# Patient Record
Sex: FEMALE | Race: WHITE | NOT HISPANIC OR LATINO | Employment: OTHER | URBAN - METROPOLITAN AREA
[De-identification: names, ages, dates, MRNs, and addresses within clinical notes are randomized per-mention and may not be internally consistent; named-entity substitution may affect disease eponyms.]

---

## 2017-01-03 ENCOUNTER — TELEPHONE (OUTPATIENT)
Dept: OBSTETRICS AND GYNECOLOGY | Facility: CLINIC | Age: 72
End: 2017-01-03

## 2017-01-03 NOTE — TELEPHONE ENCOUNTER
Attempted to schedule for time available this am but patient not available to come in at that time. Scheduled for Next Tuesday.

## 2017-01-03 NOTE — TELEPHONE ENCOUNTER
----- Message from Syl Sotomayor sent at 1/3/2017  7:55 AM CST -----  Contact: self  Patient needs same day appointment due to rash in private area. Please call patient at [phone]. Thanks!

## 2017-01-10 ENCOUNTER — OFFICE VISIT (OUTPATIENT)
Dept: OBSTETRICS AND GYNECOLOGY | Facility: CLINIC | Age: 72
End: 2017-01-10
Payer: MEDICARE

## 2017-01-10 VITALS
WEIGHT: 255.31 LBS | SYSTOLIC BLOOD PRESSURE: 168 MMHG | BODY MASS INDEX: 42.48 KG/M2 | DIASTOLIC BLOOD PRESSURE: 90 MMHG

## 2017-01-10 DIAGNOSIS — B37.9 CANDIDIASIS: Primary | ICD-10-CM

## 2017-01-10 PROCEDURE — 1160F RVW MEDS BY RX/DR IN RCRD: CPT | Mod: S$GLB,,, | Performed by: OBSTETRICS & GYNECOLOGY

## 2017-01-10 PROCEDURE — 99999 PR PBB SHADOW E&M-EST. PATIENT-LVL III: CPT | Mod: PBBFAC,,, | Performed by: OBSTETRICS & GYNECOLOGY

## 2017-01-10 PROCEDURE — 1126F AMNT PAIN NOTED NONE PRSNT: CPT | Mod: S$GLB,,, | Performed by: OBSTETRICS & GYNECOLOGY

## 2017-01-10 PROCEDURE — 1157F ADVNC CARE PLAN IN RCRD: CPT | Mod: S$GLB,,, | Performed by: OBSTETRICS & GYNECOLOGY

## 2017-01-10 PROCEDURE — 87480 CANDIDA DNA DIR PROBE: CPT

## 2017-01-10 PROCEDURE — 99213 OFFICE O/P EST LOW 20 MIN: CPT | Mod: S$GLB,,, | Performed by: OBSTETRICS & GYNECOLOGY

## 2017-01-10 PROCEDURE — 1159F MED LIST DOCD IN RCRD: CPT | Mod: S$GLB,,, | Performed by: OBSTETRICS & GYNECOLOGY

## 2017-01-10 RX ORDER — TRIAMCINOLONE ACETONIDE 5 MG/G
CREAM TOPICAL 2 TIMES DAILY
Qty: 30 G | Refills: 1 | Status: SHIPPED | OUTPATIENT
Start: 2017-01-10 | End: 2017-01-16 | Stop reason: SDUPTHER

## 2017-01-10 RX ORDER — FLUCONAZOLE 150 MG/1
150 TABLET ORAL
Qty: 4 TABLET | Refills: 0 | Status: SHIPPED | OUTPATIENT
Start: 2017-01-10 | End: 2017-03-27 | Stop reason: ALTCHOICE

## 2017-01-10 NOTE — MR AVS SNAPSHOT
[facility] - OB/GYN  [address] [place] [address]  Phone: [phone]                  Elicia Cavazos   1/10/2017 11:00 AM   Office Visit    Description:  Female : 1945   Provider:  Marta Snowden MD   Department:  [facility] - OB/GYN           Reason for Visit     Rash                To Do List           Goals (5 Years of Data)     None      [facility]     [facility] On Call Nurse Care Line -  Assistance  Registered nurses in the [facility] On Call Center provide clinical advisement, health education, appointment booking, and other advisory services.  Call for this free service at [phone].             Medications           Message regarding Medications     Verify the changes and/or additions to your medication regime listed below are the same as discussed with your clinician today.  If any of these changes or additions are incorrect, please notify your healthcare provider.             Verify that the below list of medications is an accurate representation of the medications you are currently taking.  If none reported, the list may be blank. If incorrect, please contact your healthcare provider. Carry this list with you in case of emergency.           Current Medications     aspirin (ECOTRIN) 81 MG EC tablet Take 81 mg by mouth once daily.      calcium-vitamin D 500-125 mg-unit tablet Take 1 tablet by mouth once daily.      coenzyme Q10 400 mg Cap Take 400 mg by mouth once daily.      fish oil-omega-3 fatty acids 300-1,000 mg capsule Take 2 g by mouth once daily.      metoprolol tartrate (LOPRESSOR) 50 MG tablet Take 50 mg by mouth once daily.      multivitamin (MULTIVITAMIN) per tablet Take 1 tablet by mouth once daily.      nutritional supplements Liqd Take by mouth once daily.      ramipril (ALTACE) 5 MG capsule Take 5 mg by mouth once daily.      terconazole (TERAZOL 7) 0.4 % Crea Place 1 applicator vaginally every evening.    vitamin D 185 MG Tab Take 185 mg by  mouth once daily.      nystatin-triamcinolone (MYCOLOG II) cream Apply to affected area 2 times daily    triamcinolone acetonide 0.5% (KENALOG) 0.5 % Crea Apply topically every evening.    VITAMIN E/FLAXSEED OIL (FLAXSEED OIL-VITAMIN E) 1,000-1 mg-unit Cap Take 1,000 mg by mouth once daily.             Clinical Reference Information           Vital Signs - Last Recorded  Most recent update: 1/10/2017 11:13 AM by Barbara Umana LPN    BP Wt BMI          (!) 168/90 115.8 kg (255 lb 4.7 oz) 42.48 kg/m2        Blood Pressure          Most Recent Value    BP  (!)  168/90      Allergies as of 1/10/2017     Crestor [Rosuvastatin]    Lipitor [Atorvastatin]    Pravachol [Pravastatin]    Sulfa (Sulfonamide Antibiotics)    Welchol [Colesevelam]    Zocor [Simvastatin]      Immunizations Administered on Date of Encounter - 1/10/2017     None

## 2017-01-10 NOTE — PROGRESS NOTES
Chief Complaint   Patient presents with    Rash       History of Present Illness: Elicia Cavazos is a 71 y.o. female that presents today 1/10/2017 for   Chief Complaint   Patient presents with    Rash   not getting better using multiple products, 2 weeks going on and getting worse.   She was treated for contact dermatitis with medrol pack and it resolved 10/16    Past Medical History   Diagnosis Date    Hyperlipidemia     Hypertension        Past Surgical History   Procedure Laterality Date    Hysterectomy      Carpal tunnel release      Appendectomy      Cataract extraction bilateral w/ anterior vitrectomy      Colonoscopy N/A 12/18/2015     Procedure: COLONOSCOPY;  Surgeon: Timothy Syed Jr., MD;  Location: [facility];  Service: Endoscopy;  Laterality: N/A;       Current Outpatient Prescriptions   Medication Sig Dispense Refill    aspirin (ECOTRIN) 81 MG EC tablet Take 81 mg by mouth once daily.        calcium-vitamin D 500-125 mg-unit tablet Take 1 tablet by mouth once daily.        coenzyme Q10 400 mg Cap Take 400 mg by mouth once daily.        fish oil-omega-3 fatty acids 300-1,000 mg capsule Take 2 g by mouth once daily.        metoprolol tartrate (LOPRESSOR) 50 MG tablet Take 50 mg by mouth once daily.        multivitamin (MULTIVITAMIN) per tablet Take 1 tablet by mouth once daily.        nutritional supplements Liqd Take by mouth once daily.        ramipril (ALTACE) 5 MG capsule Take 5 mg by mouth once daily.        terconazole (TERAZOL 7) 0.4 % Crea Place 1 applicator vaginally every evening. 45 g 4    vitamin D 185 MG Tab Take 185 mg by mouth once daily.        fluconazole (DIFLUCAN) 150 MG Tab Take 1 tablet (150 mg total) by mouth Every 3 (three) days. 4 tablet 0    nystatin-triamcinolone (MYCOLOG II) cream Apply to affected area 2 times daily 30 g 5    triamcinolone acetonide 0.5% (KENALOG) 0.5 % Crea Apply topically 2 (two) times daily. 30 g 1    VITAMIN E/FLAXSEED OIL (FLAXSEED  OIL-VITAMIN E) 1,000-1 mg-unit Cap Take 1,000 mg by mouth once daily.         No current facility-administered medications for this visit.        Review of patient's allergies indicates:   Allergen Reactions    Crestor [rosuvastatin] Other (See Comments)     Muscle ache    Lipitor [atorvastatin] Other (See Comments)     Muscle ache    Pravachol [pravastatin] Other (See Comments)     Muscle ache    Sulfa (sulfonamide antibiotics) Swelling    Welchol [colesevelam] Other (See Comments)     Muscle ache    Zocor [simvastatin] Other (See Comments)     Muscle ache       Family History   Problem Relation Age of Onset    Ovarian cancer Neg Hx     Breast cancer Neg Hx        Social History   Substance Use Topics    Smoking status: Never Smoker    Smokeless tobacco: Never Used    Alcohol use No       OB History    Para Term  AB SAB TAB Ectopic Multiple Living   4 4 4             # Outcome Date GA Lbr Yariel/2nd Weight Sex Delivery Anes PTL Lv   4 Term            3 Term            2 Term            1 Term                   Review of Symptoms:  GENERAL: Denies weight gain or weight loss. Feeling well overall.   SKIN: Denies rash or lesions.   HEAD: Denies head injury or headache.   NODES: Denies enlarged lymph nodes.   CHEST: Denies chest pain or shortness of breath.   CARDIOVASCULAR: Denies palpitations or left sided chest pain.   ABDOMEN: No abdominal pain, constipation, diarrhea, nausea, vomiting or rectal bleeding.   URINARY: No frequency, dysuria, hematuria, or burning on urination.  HEMATOLOGIC: No easy bruisability or excessive bleeding.   MUSCULOSKELETAL: Denies joint pain or swelling.     Visit Vitals    BP (!) 168/90    Wt 115.8 kg (255 lb 4.7 oz)     Physical Exam:  APPEARANCE: Well nourished, well developed, in no acute distress.  SKIN: Normal skin turgor, no lesions.  NECK: Neck symmetric without masses   RESPIRATORY: Normal respiratory effort with no retractions or use of accessory  muscles  CARDIOVASCULAR: Peripheral vascular system with no swelling no varicosities and palpation of pulses normal  LYMPHATIC: No enlargements of the lymph nodes noted in the neck, axillae, or groin  ABDOMEN: Soft. No tenderness or masses. No hepatosplenomegaly. No hernias.  PELVIC: Normal external female genitalia with ++ diffuse erythema to the anus Normal hair distribution. Adequate perineal body, normal urethral meatus. Urethra with no masses.  Bladder nontender. Vagina moist and well rugated without lesions or discharge. Urethra and bladder normal.  EXTREMITIES: No clubbing cyanosis or edema.    ASSESSMENT/PLAN:  Candidiasis  -     fluconazole (DIFLUCAN) 150 MG Tab; Take 1 tablet (150 mg total) by mouth Every 3 (three) days.  Dispense: 4 tablet; Refill: 0  -     triamcinolone acetonide 0.5% (KENALOG) 0.5 % Crea; Apply topically 2 (two) times daily.  Dispense: 30 g; Refill: 1  -     Vaginosis Screen by DNA Probe      15 minutes spent today with this patient. Greater than half spent in counseling today.

## 2017-01-11 LAB
CANDIDA RRNA VAG QL PROBE: NEGATIVE
G VAGINALIS RRNA GENITAL QL PROBE: NEGATIVE
T VAGINALIS RRNA GENITAL QL PROBE: NEGATIVE

## 2017-01-16 ENCOUNTER — OFFICE VISIT (OUTPATIENT)
Dept: OBSTETRICS AND GYNECOLOGY | Facility: CLINIC | Age: 72
End: 2017-01-16
Payer: MEDICARE

## 2017-01-16 VITALS
SYSTOLIC BLOOD PRESSURE: 126 MMHG | WEIGHT: 253.75 LBS | DIASTOLIC BLOOD PRESSURE: 80 MMHG | BODY MASS INDEX: 42.23 KG/M2

## 2017-01-16 DIAGNOSIS — N76.3 CHRONIC VULVITIS: ICD-10-CM

## 2017-01-16 DIAGNOSIS — B37.9 CANDIDIASIS: ICD-10-CM

## 2017-01-16 PROCEDURE — 99213 OFFICE O/P EST LOW 20 MIN: CPT | Mod: S$GLB,,, | Performed by: OBSTETRICS & GYNECOLOGY

## 2017-01-16 PROCEDURE — 1160F RVW MEDS BY RX/DR IN RCRD: CPT | Mod: S$GLB,,, | Performed by: OBSTETRICS & GYNECOLOGY

## 2017-01-16 PROCEDURE — 1157F ADVNC CARE PLAN IN RCRD: CPT | Mod: S$GLB,,, | Performed by: OBSTETRICS & GYNECOLOGY

## 2017-01-16 PROCEDURE — 1159F MED LIST DOCD IN RCRD: CPT | Mod: S$GLB,,, | Performed by: OBSTETRICS & GYNECOLOGY

## 2017-01-16 PROCEDURE — 99999 PR PBB SHADOW E&M-EST. PATIENT-LVL II: CPT | Mod: PBBFAC,,, | Performed by: OBSTETRICS & GYNECOLOGY

## 2017-01-16 PROCEDURE — 1126F AMNT PAIN NOTED NONE PRSNT: CPT | Mod: S$GLB,,, | Performed by: OBSTETRICS & GYNECOLOGY

## 2017-01-16 RX ORDER — NYSTATIN AND TRIAMCINOLONE ACETONIDE 100000; 1 [USP'U]/G; MG/G
CREAM TOPICAL
Qty: 30 G | Refills: 5 | Status: SHIPPED | OUTPATIENT
Start: 2017-01-16 | End: 2017-03-27 | Stop reason: SDUPTHER

## 2017-01-16 RX ORDER — TRIAMCINOLONE ACETONIDE 5 MG/G
CREAM TOPICAL 2 TIMES DAILY
Qty: 30 G | Refills: 1 | Status: SHIPPED | OUTPATIENT
Start: 2017-01-16 | End: 2017-01-26

## 2017-01-16 NOTE — MR AVS SNAPSHOT
[facility] - OB/GYN  [address] [place] [address]  Phone: [phone]                  Elicia Cavazos   2017 9:40 AM   Office Visit    Description:  Female : 1945   Provider:  Marta Snowden MD   Department:  [facility] - OB/GYN           Reason for Visit     Follow-up                To Do List           Future Appointments        Provider Department Dept Phone    2017 9:40 AM Marta Snowden MD [facility] [phone]      Goals (5 Years of Data)     None      [facility]     [facility] -  Assistance  Registered nurses in the [facility] provide clinical advisement, health education, appointment booking, and other advisory services.  Call for this free service at [phone].             Medications           Message regarding Medications     Verify the changes and/or additions to your medication regime listed below are the same as discussed with your clinician today.  If any of these changes or additions are incorrect, please notify your healthcare provider.             Verify that the below list of medications is an accurate representation of the medications you are currently taking.  If none reported, the list may be blank. If incorrect, please contact your healthcare provider. Carry this list with you in case of emergency.           Current Medications     aspirin (ECOTRIN) 81 MG EC tablet Take 81 mg by mouth once daily.      calcium-vitamin D 500-125 mg-unit tablet Take 1 tablet by mouth once daily.      coenzyme Q10 400 mg Cap Take 400 mg by mouth once daily.      fish oil-omega-3 fatty acids 300-1,000 mg capsule Take 2 g by mouth once daily.      fluconazole (DIFLUCAN) 150 MG Tab Take 1 tablet (150 mg total) by mouth Every 3 (three) days.    metoprolol tartrate (LOPRESSOR) 50 MG tablet Take 50 mg by mouth once daily.      multivitamin (MULTIVITAMIN) per tablet Take 1 tablet by mouth once daily.       nutritional supplements Liqd Take by mouth once daily.      nystatin-triamcinolone (MYCOLOG II) cream Apply to affected area 2 times daily    ramipril (ALTACE) 5 MG capsule Take 5 mg by mouth once daily.      terconazole (TERAZOL 7) 0.4 % Crea Place 1 applicator vaginally every evening.    triamcinolone acetonide 0.5% (KENALOG) 0.5 % Crea Apply topically 2 (two) times daily.    vitamin D 185 MG Tab Take 185 mg by mouth once daily.      VITAMIN E/FLAXSEED OIL (FLAXSEED OIL-VITAMIN E) 1,000-1 mg-unit Cap Take 1,000 mg by mouth once daily.             Clinical Reference Information           Vital Signs - Last Recorded  Most recent update: 1/16/2017  9:26 AM by Lizz Brooks LPN    BP Wt BMI          126/80 115.1 kg (253 lb 12 oz) 42.23 kg/m2        Blood Pressure          Most Recent Value    BP  126/80      Allergies as of 1/16/2017     Crestor [Rosuvastatin]    Lipitor [Atorvastatin]    Pravachol [Pravastatin]    Sulfa (Sulfonamide Antibiotics)    Welchol [Colesevelam]    Zocor [Simvastatin]      Immunizations Administered on Date of Encounter - 1/16/2017     None

## 2017-01-16 NOTE — PROGRESS NOTES
Chief Complaint   Patient presents with    Follow-up     pt states problem is resolving       History of Present Illness: Elicia Cavazos is a 71 y.o. female that presents today 1/16/2017 for   Chief Complaint   Patient presents with    Follow-up     pt states problem is resolving         Past Medical History   Diagnosis Date    Hyperlipidemia     Hypertension        Past Surgical History   Procedure Laterality Date    Hysterectomy      Carpal tunnel release      Appendectomy      Cataract extraction bilateral w/ anterior vitrectomy      Colonoscopy N/A 12/18/2015     Procedure: COLONOSCOPY;  Surgeon: Timothy Syed Jr., MD;  Location: [facility];  Service: Endoscopy;  Laterality: N/A;       Current Outpatient Prescriptions   Medication Sig Dispense Refill    aspirin (ECOTRIN) 81 MG EC tablet Take 81 mg by mouth once daily.        calcium-vitamin D 500-125 mg-unit tablet Take 1 tablet by mouth once daily.        coenzyme Q10 400 mg Cap Take 400 mg by mouth once daily.        fish oil-omega-3 fatty acids 300-1,000 mg capsule Take 2 g by mouth once daily.        fluconazole (DIFLUCAN) 150 MG Tab Take 1 tablet (150 mg total) by mouth Every 3 (three) days. 4 tablet 0    metoprolol tartrate (LOPRESSOR) 50 MG tablet Take 50 mg by mouth once daily.        multivitamin (MULTIVITAMIN) per tablet Take 1 tablet by mouth once daily.        nutritional supplements Liqd Take by mouth once daily.        nystatin-triamcinolone (MYCOLOG II) cream Apply to affected area 2 times daily 30 g 5    ramipril (ALTACE) 5 MG capsule Take 5 mg by mouth once daily.        terconazole (TERAZOL 7) 0.4 % Crea Place 1 applicator vaginally every evening. 45 g 4    triamcinolone acetonide 0.5% (KENALOG) 0.5 % Crea Apply topically 2 (two) times daily. 30 g 1    vitamin D 185 MG Tab Take 185 mg by mouth once daily.        VITAMIN E/FLAXSEED OIL (FLAXSEED OIL-VITAMIN E) 1,000-1 mg-unit Cap Take 1,000 mg by mouth once daily.         No  current facility-administered medications for this visit.        Review of patient's allergies indicates:   Allergen Reactions    Crestor [rosuvastatin] Other (See Comments)     Muscle ache    Lipitor [atorvastatin] Other (See Comments)     Muscle ache    Pravachol [pravastatin] Other (See Comments)     Muscle ache    Sulfa (sulfonamide antibiotics) Swelling    Welchol [colesevelam] Other (See Comments)     Muscle ache    Zocor [simvastatin] Other (See Comments)     Muscle ache       Family History   Problem Relation Age of Onset    Ovarian cancer Neg Hx     Breast cancer Neg Hx        Social History   Substance Use Topics    Smoking status: Never Smoker    Smokeless tobacco: Never Used    Alcohol use No       OB History    Para Term  AB SAB TAB Ectopic Multiple Living   4 4 4             # Outcome Date GA Lbr Yariel/2nd Weight Sex Delivery Anes PTL Lv   4 Term            3 Term            2 Term            1 Term                   Review of Symptoms:  GENERAL: Denies weight gain or weight loss. Feeling well overall.   SKIN: ++improved rash  HEAD: Denies head injury or headache.   NODES: Denies enlarged lymph nodes.   CHEST: Denies chest pain or shortness of breath.   CARDIOVASCULAR: Denies palpitations or left sided chest pain.   ABDOMEN: No abdominal pain, constipation, diarrhea, nausea, vomiting or rectal bleeding.   URINARY: No frequency, dysuria, hematuria, or burning on urination.  HEMATOLOGIC: No easy bruisability or excessive bleeding.   MUSCULOSKELETAL: Denies joint pain or swelling.     Visit Vitals    /80    Wt 115.1 kg (253 lb 12 oz)     Physical Exam:  APPEARANCE: Well nourished, well developed, in no acute distress.  SKIN: Normal skin turgor, no lesions.  NECK: Neck symmetric without masses   RESPIRATORY: Normal respiratory effort with no retractions or use of accessory muscles  CARDIOVASCULAR: Peripheral vascular system with no swelling no varicosities and palpation of  pulses normal  LYMPHATIC: No enlargements of the lymph nodes noted in the neck, axillae, or groin  ABDOMEN: Soft. No tenderness or masses. No hepatosplenomegaly. No hernias.  PELVIC: Normal external female genitalia with ++ less diffuse erythema to the anus Normal hair distribution. Adequate perineal body, normal urethral meatus. Urethra with no masses.  Bladder nontender. Vagina moist and well rugated without lesions or discharge. Urethra and bladder normal.  EXTREMITIES: No clubbing cyanosis or edema.    ASSESSMENT/PLAN:  Chronic vulvitis  -     triamcinolone acetonide 0.5% (KENALOG) 0.5 % Crea; Apply topically 2 (two) times daily.  Dispense: 30 g; Refill: 1    Candidiasis  -     nystatin-triamcinolone (MYCOLOG II) cream; Apply to affected area 2 times daily  Dispense: 30 g; Refill: 5      15 minutes spent today with this patient. Greater than half spent in counseling today.

## 2017-01-18 ENCOUNTER — TELEPHONE (OUTPATIENT)
Dept: OBSTETRICS AND GYNECOLOGY | Facility: CLINIC | Age: 72
End: 2017-01-18

## 2017-01-18 RX ORDER — NYSTATIN 100000 U/G
CREAM TOPICAL 2 TIMES DAILY
Qty: 30 G | Refills: 2 | Status: SHIPPED | OUTPATIENT
Start: 2017-01-18 | End: 2023-10-05 | Stop reason: CLARIF

## 2017-01-18 RX ORDER — TRIAMCINOLONE ACETONIDE 1 MG/G
CREAM TOPICAL 2 TIMES DAILY
Qty: 45 G | Refills: 2 | Status: SHIPPED | OUTPATIENT
Start: 2017-01-18 | End: 2017-01-28

## 2017-01-18 NOTE — TELEPHONE ENCOUNTER
Can you send triamcinolone and nystatin creams separately, pt cannot afford the Mycolog it is $90.  Allergies and pharmacy reviewed.

## 2017-01-18 NOTE — TELEPHONE ENCOUNTER
----- Message from Shraddha Dixon sent at 1/18/2017  9:38 AM CST -----  Contact: pt [phone] cell   Patient called and asked for a call back she states the Cream that was called in  cost $90.00 this is too expensive.

## 2017-03-15 NOTE — TELEPHONE ENCOUNTER
----- Message from Karina Chung sent at 3/15/2017  7:23 AM CDT -----  refill  metoprolol tartrate (LOPRESSOR) 50 MG tablet QD  ramipril (ALTACE) 5 MG capsule QD    [phone] ([facility])  Or [phone]    [facility]   pt states that she had a follow up with Dr Garcia and will call back with that date so we put pt on our schedule

## 2017-03-16 NOTE — TELEPHONE ENCOUNTER
----- Message from Karina Chung sent at 3/16/2017 11:54 AM CDT -----  Patient states she is calling to give information to office, she doesn't remember who she was talking to, [phone] (home)

## 2017-03-17 RX ORDER — METOPROLOL TARTRATE 50 MG/1
50 TABLET ORAL DAILY
Qty: 90 TABLET | Refills: 0 | Status: SHIPPED | OUTPATIENT
Start: 2017-03-17 | End: 2017-04-03 | Stop reason: CLARIF

## 2017-03-17 RX ORDER — RAMIPRIL 5 MG/1
5 CAPSULE ORAL DAILY
Qty: 90 CAPSULE | Refills: 0 | Status: SHIPPED | OUTPATIENT
Start: 2017-03-17 | End: 2017-07-03 | Stop reason: SDUPTHER

## 2017-03-17 NOTE — TELEPHONE ENCOUNTER
PT needs to schedule appointment for follow-up and reassessment if she hasn't already done so; please also notify her that 90 days of her medications requested was sent in; but she needs to be seen before then

## 2017-03-25 RX ORDER — HYDROCODONE BITARTRATE AND ACETAMINOPHEN 7.5; 325 MG/1; MG/1
1 TABLET ORAL EVERY 6 HOURS PRN
COMMUNITY
End: 2019-06-10 | Stop reason: ALTCHOICE

## 2017-03-25 RX ORDER — CETIRIZINE HYDROCHLORIDE 10 MG/1
10 TABLET ORAL DAILY
COMMUNITY
End: 2021-11-16

## 2017-03-25 RX ORDER — ACETAMINOPHEN 500 MG
500 TABLET ORAL 3 TIMES DAILY PRN
COMMUNITY

## 2017-03-25 RX ORDER — TRIAMCINOLONE ACETONIDE 55 UG/1
2 SPRAY, METERED NASAL DAILY
COMMUNITY

## 2017-03-25 RX ORDER — METHOCARBAMOL 750 MG/1
500 TABLET, FILM COATED ORAL
COMMUNITY
End: 2017-03-27

## 2017-03-25 RX ORDER — ATORVASTATIN CALCIUM 40 MG/1
40 TABLET, FILM COATED ORAL NIGHTLY
COMMUNITY
End: 2017-03-27

## 2017-03-25 RX ORDER — FUROSEMIDE 20 MG/1
20 TABLET ORAL DAILY
COMMUNITY
End: 2017-05-11

## 2017-03-27 ENCOUNTER — OFFICE VISIT (OUTPATIENT)
Dept: FAMILY MEDICINE | Facility: CLINIC | Age: 72
End: 2017-03-27
Payer: MEDICARE

## 2017-03-27 VITALS
DIASTOLIC BLOOD PRESSURE: 80 MMHG | WEIGHT: 255.38 LBS | SYSTOLIC BLOOD PRESSURE: 138 MMHG | TEMPERATURE: 98 F | HEART RATE: 67 BPM | HEIGHT: 65 IN | BODY MASS INDEX: 42.55 KG/M2 | OXYGEN SATURATION: 98 %

## 2017-03-27 DIAGNOSIS — I10 BENIGN ESSENTIAL HTN: ICD-10-CM

## 2017-03-27 DIAGNOSIS — I47.10 PSVT (PAROXYSMAL SUPRAVENTRICULAR TACHYCARDIA): ICD-10-CM

## 2017-03-27 DIAGNOSIS — I25.10 CORONARY ARTERIOSCLEROSIS: ICD-10-CM

## 2017-03-27 DIAGNOSIS — R73.02 IMPAIRED GLUCOSE TOLERANCE: ICD-10-CM

## 2017-03-27 DIAGNOSIS — R60.0 BILATERAL LEG EDEMA: ICD-10-CM

## 2017-03-27 DIAGNOSIS — Z86.010 HISTORY OF COLONIC POLYPS: ICD-10-CM

## 2017-03-27 DIAGNOSIS — E55.9 VITAMIN D DEFICIENCY: ICD-10-CM

## 2017-03-27 DIAGNOSIS — Z78.9 STATIN INTOLERANCE: ICD-10-CM

## 2017-03-27 DIAGNOSIS — E66.01 MORBID OBESITY DUE TO EXCESS CALORIES: ICD-10-CM

## 2017-03-27 DIAGNOSIS — E78.00 PURE HYPERCHOLESTEROLEMIA: Primary | ICD-10-CM

## 2017-03-27 PROCEDURE — 1160F RVW MEDS BY RX/DR IN RCRD: CPT | Mod: S$GLB,,, | Performed by: INTERNAL MEDICINE

## 2017-03-27 PROCEDURE — 1159F MED LIST DOCD IN RCRD: CPT | Mod: S$GLB,,, | Performed by: INTERNAL MEDICINE

## 2017-03-27 PROCEDURE — 1157F ADVNC CARE PLAN IN RCRD: CPT | Mod: S$GLB,,, | Performed by: INTERNAL MEDICINE

## 2017-03-27 PROCEDURE — 99999 PR PBB SHADOW E&M-EST. PATIENT-LVL IV: CPT | Mod: PBBFAC,,, | Performed by: INTERNAL MEDICINE

## 2017-03-27 PROCEDURE — 99215 OFFICE O/P EST HI 40 MIN: CPT | Mod: S$GLB,,, | Performed by: INTERNAL MEDICINE

## 2017-03-27 PROCEDURE — 3075F SYST BP GE 130 - 139MM HG: CPT | Mod: S$GLB,,, | Performed by: INTERNAL MEDICINE

## 2017-03-27 PROCEDURE — 3079F DIAST BP 80-89 MM HG: CPT | Mod: S$GLB,,, | Performed by: INTERNAL MEDICINE

## 2017-03-27 PROCEDURE — 1125F AMNT PAIN NOTED PAIN PRSNT: CPT | Mod: S$GLB,,, | Performed by: INTERNAL MEDICINE

## 2017-03-27 RX ORDER — LOVASTATIN 40 MG/1
40 TABLET ORAL NIGHTLY
Qty: 30 TABLET | Refills: 2 | Status: SHIPPED | OUTPATIENT
Start: 2017-03-27 | End: 2017-05-11 | Stop reason: SDUPTHER

## 2017-03-27 RX ORDER — ASCORBIC ACID 500 MG
500 TABLET ORAL DAILY
COMMUNITY

## 2017-03-27 RX ORDER — VITAMIN E 268 MG
400 CAPSULE ORAL DAILY
COMMUNITY

## 2017-03-27 NOTE — PROGRESS NOTES
Subjective:       Patient ID: Elicia Cavazos is a 71 y.o. female.    Chief Complaint: Follow-up (labs- [facility] )    HPI patient's a very pleasant 71-year-old lady established patient of mine here to get re-established with me at the [facility].Last available office notice 1/26/17  w running diagnosis is that time included bilateral lower extremity edema, impaired glucose tolerance, coronary artery disease without angina pectoris, essential hypertension, pure hypocholesterolemia, morbid obesity, and vitamin D deficiency, history of carotid artery stenosis, as well as coronary arteriosclerosis, and atherosclerosis of the arteries of the extremities, as well as a history of proximal supraventricular tachycardia and premature beats.  Noted obesity with a BMI 42.6 on 1/26/17.  Hemoglobin A1c 9/13/16 was 5.4; 2 hour glucose test was done on 1/23/17 with a 2 hour reading of 159 consistent with impaired 2 hour glucose; fasting was 100; 1/23/17 comprehensive metabolic panel was within normal limits, except with a fasting glucose of 103, slightly elevated and a vitamin D level 25-hydroxy of 36.1.     Patient is taking fish oil and flaxseed oil for hyperlipidemia of her stopped Lipitor 2 months ago due to myalgias.  She is on Q Josephine 100 mg per day.  She has no chest pain shortness of breath or palpitations.  She drinks 1 cup of coffee per day and 1 candidate T per day with occasional chocolate.  She is on Lopressor 50 mg per day and watches her salt intake she is also on Altace 5 mg per day.  Lower extremity edema is better she is on Lasix 20 mg per day.  She is not using her compression stockings but she does elevate her lower extremities and adheres to less than 2 g sodium diet.  Her blood pressure at home 7 averaging around 130/80 and she exercises 3 times a week walking about 15 minutes she is noted now to have statin intolerance to Crestor or Lipitor pravastatin and simvastatin will continue her acute all at  100 mg per day and try lovastatin.  And hopefully eventually add Zetia.  She's also had myalgias with WelChol in the past as well.  Her labs done at lab [facility] from 3/21/17 were discussed with the patient at length which included an NMR lipid profile off the statin her cholesterol was now up to 223 and her HDL was reduced at 48 and her total LDL cholesterol was elevated in the 148;  please see the subfractionation results.  Various diagnosis's as well as the planning care were discussed with the patient at length as well as her labs presented today from [facility].. Past medical history, surgical medical history, social medical history, as well as family medical history, with review of systems before physical exam, were all reviewed.      Total time 11:30 -1210 p.m.    Review of Systems   Constitutional: Negative for appetite change, fatigue, fever and unexpected weight change.   HENT:         history of seasonal ALLERGIES    Eyes: Negative for discharge and itching.   Respiratory: Negative for cough, chest tightness, shortness of breath and wheezing.    Cardiovascular: Positive for leg swelling. Negative for chest pain and palpitations.        Improving.   Gastrointestinal: Negative for abdominal distention, abdominal pain, blood in stool, constipation, diarrhea, nausea and vomiting.        Denies melena as well   Endocrine:        HLP; impaired gluc shin.   Genitourinary: Negative for dysuria, hematuria and vaginal bleeding.   Musculoskeletal: Negative for arthralgias and myalgias.        Fell 1 month ago; diffuse MSK pain; resolving except seeing back Dr; Dr KRAIG Giraldo; pain management for LBP; MRI pending.   Skin: Negative for rash.   Allergic/Immunologic: Positive for environmental allergies. Negative for food allergies and immunocompromised state.        Denies seasonal or perennial ALLERGIES.   Neurological: Negative for tremors, seizures and syncope.   Hematological: Negative for adenopathy. Does not bruise/bleed  "easily.   Psychiatric/Behavioral:        Denies anxiety or depression       Objective:      Vitals:    03/27/17 1053   BP: 138/80   BP Location: Right arm   Patient Position: Sitting   BP Method: Manual   Pulse: 67   Temp: 97.5 °F (36.4 °C)   TempSrc: Oral   SpO2: 98%   Weight: 115.9 kg (255 lb 6.5 oz)   Height: 5' 5" (1.651 m)     Body mass index is 42.5 kg/(m^2).    Physical Exam   Constitutional: She is oriented to person, place, and time. She appears well-developed and well-nourished.   HENT:   Head: Normocephalic and atraumatic.   Eyes: EOM are normal.   Neck: Normal range of motion. Neck supple. No thyromegaly present.   No bruits heard.   Cardiovascular: Normal rate and regular rhythm.  Exam reveals no gallop.    No murmur heard.  HOMER 3/6 aortic area.   Pulmonary/Chest: Effort normal and breath sounds normal. No respiratory distress. She has no wheezes. She has no rales.   Abdominal: Soft. Bowel sounds are normal. She exhibits no distension. There is no tenderness. There is no rebound and no guarding.   Musculoskeletal: Normal range of motion. She exhibits no edema.   Obese jony LE's; spider veins; 2+ LE edema jony.   Lymphadenopathy:     She has no cervical adenopathy.   Neurological: She is alert and oriented to person, place, and time.   Moves all 4 extremities fine.   Skin: No rash noted.   Psychiatric: She has a normal mood and affect. Her behavior is normal. Thought content normal.   Vitals reviewed.      Assessment:       1. Pure hypercholesterolemia    2. Statin intolerance    3. Coronary arteriosclerosis    4. Impaired glucose tolerance    5. Benign essential HTN    6. Morbid obesity due to excess calories    7. Bilateral leg edema    8. PSVT (paroxysmal supraventricular tachycardia)    9. Vitamin D deficiency    10. History of colonic polyps        Plan:       Pure hypercholesterolemia; regular weight-bearing exercises needed as well as weight reduction attempts and a low-fat high-fiber diet; " atorvastatin has been discontinued altogether due to myalgias; continue her Qunol 100 mg per day; continue her fish oil as well as flaxseed oil; weight bearing exercise and attempts to lose weight are needed; continue fiber supplement as Citrucel daily  -     lovastatin (MEVACOR) 40 MG tablet; Take 1 tablet (40 mg total) by mouth every evening.  Dispense: 30 tablet; Refill: 2  -     Comprehensive metabolic panel; Future; Expected date: 6/25/17    Statin intolerance  -     lovastatin (MEVACOR) 40 MG tablet; Take 1 tablet (40 mg total) by mouth every evening.  Dispense: 30 tablet; Refill: 2    Coronary arteriosclerosis; has been stable; her cardiologist is Dr. Rachel;  need a copy of his last echocardiogram    Impaired glucose tolerance; exercise with weight reduction attempts are needed as well as a low-carb diet  -     Hemoglobin A1c; Future; Expected date: 6/25/17    Benign essential HTN; monitor her blood pressure readings at home serially and keep a log maintaining less than 2 g sodium diet; continue her Lopressor and Altace  -     Comprehensive metabolic panel; Future; Expected date: 6/25/17  -     Magnesium; Future; Expected date: 3/27/17    Morbid obesity due to excess calories; weight reduction attempts with regular weightbearing exercise and caloric restriction as needed    Bilateral leg edema; to try to find compression stockings with zippers; maintaining less than 2 g sodium diet and elevate her lower extremities at home; continue her Lasix at present dose;   -     Comprehensive metabolic panel; Future; Expected date: 6/25/17  -     Magnesium; Future; Expected date: 3/27/17    PSVT (paroxysmal supraventricular tachycardia); limit her caffeine intake and continue her metoprolol    Vitamin D deficiency; continue her calcium and vitamin D supplementation    Colon polyps; sees GI Dr Syed; last total colonoscopy 1 year ago; benign polyps were removed; follow-up due in 2 years for colonoscopy; continue ASA and  high fiber supplements as well as high-fiber diet

## 2017-04-03 RX ORDER — METOPROLOL SUCCINATE 50 MG/1
50 TABLET, EXTENDED RELEASE ORAL DAILY
Qty: 90 TABLET | Refills: 1 | Status: SHIPPED | OUTPATIENT
Start: 2017-04-03 | End: 2017-05-29 | Stop reason: SDUPTHER

## 2017-04-03 RX ORDER — METOPROLOL SUCCINATE 50 MG/1
50 TABLET, EXTENDED RELEASE ORAL DAILY
Qty: 30 TABLET | Refills: 0 | Status: SHIPPED | OUTPATIENT
Start: 2017-04-03 | End: 2017-04-03 | Stop reason: SDUPTHER

## 2017-04-03 RX ORDER — METOPROLOL SUCCINATE 50 MG/1
50 TABLET, EXTENDED RELEASE ORAL DAILY
Qty: 90 TABLET | Refills: 1 | Status: SHIPPED | OUTPATIENT
Start: 2017-04-03 | End: 2017-04-03 | Stop reason: SDUPTHER

## 2017-04-03 NOTE — TELEPHONE ENCOUNTER
----- Message from Brett Garcia sent at 4/3/2017  8:11 AM CDT -----  Contact: Patient  Patient states that she needs a refill for the Metoprolol SUCC (LOPRESSOR) 50 MG tablets.  She received the TART and she does not take the TART.  The patient has the wrong medication and to please call her mail order pharmacy ASAP because she is running out.  Please call her cell phone:  [phone].  Thank you        [facility] - [place], [place] - [address]   [address] [facility] [address]  Phone: [phone] Fax: [phone]

## 2017-04-03 NOTE — TELEPHONE ENCOUNTER
Pt came in office due to she said her phone is not working to find out about her Rx refill; pt was informed that refill was sent to physician for approval and to be sent to mail orde; [facility] till she gets mail order due to she is out for two days.

## 2017-04-03 NOTE — TELEPHONE ENCOUNTER
----- Message from Jonathan Veloz sent at 4/3/2017  8:49 AM CDT -----  Contact: qlah- [phone]  Patient returning the nurse phone call.Thanks!

## 2017-04-03 NOTE — TELEPHONE ENCOUNTER
Notify patient that her metoprolol succinate was sent in for 90 day supply with one refill patient's to keep her follow-up with labs

## 2017-04-04 NOTE — TELEPHONE ENCOUNTER
Notify patient that 30 day supply of the metoprolol was sent into a pharmacist at 90 day supply to her mail order company.  Keep her follow-up appointment

## 2017-04-27 ENCOUNTER — PATIENT OUTREACH (OUTPATIENT)
Dept: ADMINISTRATIVE | Facility: HOSPITAL | Age: 72
End: 2017-04-27

## 2017-04-27 DIAGNOSIS — I10 ESSENTIAL HYPERTENSION: Primary | ICD-10-CM

## 2017-05-08 ENCOUNTER — LAB VISIT (OUTPATIENT)
Dept: LAB | Facility: HOSPITAL | Age: 72
End: 2017-05-08
Attending: INTERNAL MEDICINE
Payer: MEDICARE

## 2017-05-08 DIAGNOSIS — I25.10 CORONARY ARTERIOSCLEROSIS: ICD-10-CM

## 2017-05-08 DIAGNOSIS — I10 ESSENTIAL HYPERTENSION: ICD-10-CM

## 2017-05-08 DIAGNOSIS — R73.02 IMPAIRED GLUCOSE TOLERANCE: ICD-10-CM

## 2017-05-08 DIAGNOSIS — I10 BENIGN ESSENTIAL HTN: ICD-10-CM

## 2017-05-08 DIAGNOSIS — E78.00 PURE HYPERCHOLESTEROLEMIA: ICD-10-CM

## 2017-05-08 DIAGNOSIS — E55.9 VITAMIN D DEFICIENCY: ICD-10-CM

## 2017-05-08 DIAGNOSIS — R60.0 BILATERAL LEG EDEMA: ICD-10-CM

## 2017-05-08 LAB
25(OH)D3+25(OH)D2 SERPL-MCNC: 43 NG/ML
ALBUMIN SERPL BCP-MCNC: 3.5 G/DL
ALP SERPL-CCNC: 85 U/L
ALT SERPL W/O P-5'-P-CCNC: 9 U/L
ANION GAP SERPL CALC-SCNC: 8 MMOL/L
AST SERPL-CCNC: 16 U/L
BILIRUB SERPL-MCNC: 0.5 MG/DL
BUN SERPL-MCNC: 19 MG/DL
CALCIUM SERPL-MCNC: 9.7 MG/DL
CHLORIDE SERPL-SCNC: 106 MMOL/L
CHOLEST/HDLC SERPL: 4.7 {RATIO}
CO2 SERPL-SCNC: 26 MMOL/L
CREAT SERPL-MCNC: 0.7 MG/DL
EST. GFR  (AFRICAN AMERICAN): >60 ML/MIN/1.73 M^2
EST. GFR  (NON AFRICAN AMERICAN): >60 ML/MIN/1.73 M^2
GLUCOSE SERPL-MCNC: 94 MG/DL
HDL/CHOLESTEROL RATIO: 21.1 %
HDLC SERPL-MCNC: 218 MG/DL
HDLC SERPL-MCNC: 46 MG/DL
LDLC SERPL CALC-MCNC: 150.4 MG/DL
MAGNESIUM SERPL-MCNC: 1.9 MG/DL
NONHDLC SERPL-MCNC: 172 MG/DL
POTASSIUM SERPL-SCNC: 4.2 MMOL/L
PROT SERPL-MCNC: 7.4 G/DL
SODIUM SERPL-SCNC: 140 MMOL/L
TRIGL SERPL-MCNC: 108 MG/DL

## 2017-05-08 PROCEDURE — 36415 COLL VENOUS BLD VENIPUNCTURE: CPT | Mod: PO

## 2017-05-08 PROCEDURE — 80061 LIPID PANEL: CPT

## 2017-05-08 PROCEDURE — 80053 COMPREHEN METABOLIC PANEL: CPT

## 2017-05-08 PROCEDURE — 82306 VITAMIN D 25 HYDROXY: CPT

## 2017-05-08 PROCEDURE — 83036 HEMOGLOBIN GLYCOSYLATED A1C: CPT

## 2017-05-08 PROCEDURE — 83735 ASSAY OF MAGNESIUM: CPT

## 2017-05-09 LAB
ESTIMATED AVG GLUCOSE: 114 MG/DL
HBA1C MFR BLD HPLC: 5.6 %

## 2017-05-11 ENCOUNTER — OFFICE VISIT (OUTPATIENT)
Dept: FAMILY MEDICINE | Facility: CLINIC | Age: 72
End: 2017-05-11
Payer: MEDICARE

## 2017-05-11 VITALS
DIASTOLIC BLOOD PRESSURE: 80 MMHG | HEART RATE: 70 BPM | TEMPERATURE: 98 F | OXYGEN SATURATION: 98 % | BODY MASS INDEX: 42.4 KG/M2 | WEIGHT: 254.5 LBS | SYSTOLIC BLOOD PRESSURE: 130 MMHG | HEIGHT: 65 IN

## 2017-05-11 DIAGNOSIS — Z78.9 STATIN INTOLERANCE: ICD-10-CM

## 2017-05-11 DIAGNOSIS — E55.9 VITAMIN D DEFICIENCY: ICD-10-CM

## 2017-05-11 DIAGNOSIS — E78.00 PURE HYPERCHOLESTEROLEMIA: ICD-10-CM

## 2017-05-11 DIAGNOSIS — R60.0 BILATERAL LEG EDEMA: ICD-10-CM

## 2017-05-11 DIAGNOSIS — I10 BENIGN ESSENTIAL HTN: Primary | ICD-10-CM

## 2017-05-11 DIAGNOSIS — E66.01 MORBID OBESITY DUE TO EXCESS CALORIES: ICD-10-CM

## 2017-05-11 DIAGNOSIS — I25.10 CORONARY ARTERIOSCLEROSIS: ICD-10-CM

## 2017-05-11 PROCEDURE — 99214 OFFICE O/P EST MOD 30 MIN: CPT | Mod: S$GLB,,, | Performed by: INTERNAL MEDICINE

## 2017-05-11 PROCEDURE — 3079F DIAST BP 80-89 MM HG: CPT | Mod: S$GLB,,, | Performed by: INTERNAL MEDICINE

## 2017-05-11 PROCEDURE — 1126F AMNT PAIN NOTED NONE PRSNT: CPT | Mod: S$GLB,,, | Performed by: INTERNAL MEDICINE

## 2017-05-11 PROCEDURE — 1159F MED LIST DOCD IN RCRD: CPT | Mod: S$GLB,,, | Performed by: INTERNAL MEDICINE

## 2017-05-11 PROCEDURE — 3075F SYST BP GE 130 - 139MM HG: CPT | Mod: S$GLB,,, | Performed by: INTERNAL MEDICINE

## 2017-05-11 PROCEDURE — 1160F RVW MEDS BY RX/DR IN RCRD: CPT | Mod: S$GLB,,, | Performed by: INTERNAL MEDICINE

## 2017-05-11 PROCEDURE — 99999 PR PBB SHADOW E&M-EST. PATIENT-LVL III: CPT | Mod: PBBFAC,,, | Performed by: INTERNAL MEDICINE

## 2017-05-11 RX ORDER — LOVASTATIN 40 MG/1
TABLET ORAL
Qty: 60 TABLET | Refills: 2 | Status: SHIPPED | OUTPATIENT
Start: 2017-05-11 | End: 2018-02-20

## 2017-05-11 NOTE — PROGRESS NOTES
Subjective:       Patient ID: Elicia Cavazos is a 71 y.o. female.    Chief Complaint: Follow-up (labs )    HPI  reassessment today and go over her labs.  Overall she's doing fine with no chest pain shortness of breath or palpitations.  Bilateral lower extremity edema is about the same.  With regards to her blood pressure her average blood pressure at home is been 130/85 and she watches her salt intake she is tolerating her lovastatin 40 mg per day fine and is on a low-fat high-fiber diet mostly.  She exercises 3 times per week with walking.  20-25 minutes each time labs were reviewed and discussed as well as planning care.  Lipids revealed cholesterol elevated at 218, LDL elevated at 150, HDL reduced at 46, and triglycerides stable at 108.  Hemoglobin A1c was 5.6, with a fasting blood sugar of 94.  Of note patient is on Qunol 100 mg a day.  Patient has a UA showing asymptomatic bacteriuria.  She has no dysuria or followed at her urine she has a good urine output with no complaints of frequency.  He has no complaints of fever or chills.  Various diagnosis is discussed as well as planning care total time in room 5710-6292.  Planning care discussed at length; questions answered and patient was counseled.  Appropriate labs were ordered for follow-up    Review of Systems   Constitutional: Negative for appetite change, fatigue, fever and unexpected weight change.   HENT:         history of seasonal ALLERGIES    Eyes: Negative for discharge and itching.   Respiratory: Negative for cough, chest tightness, shortness of breath and wheezing.    Cardiovascular: Negative for chest pain, palpitations and leg swelling.   Gastrointestinal: Negative for abdominal distention, abdominal pain, blood in stool, constipation, diarrhea, nausea and vomiting.        Denies melena as well   Endocrine: Negative for polydipsia, polyphagia and polyuria.   Genitourinary: Negative for dysuria, hematuria and urgency.   Musculoskeletal: Negative for  "arthralgias and myalgias.   Skin: Negative for rash.   Allergic/Immunologic: Positive for environmental allergies. Negative for food allergies and immunocompromised state.        Denies seasonal or perennial ALLERGIES.   Neurological: Negative for tremors, seizures, syncope and headaches.   Hematological: Negative for adenopathy. Does not bruise/bleed easily.   Psychiatric/Behavioral:        Denies anxiety or depression       Objective:      Vitals:    05/11/17 0907   BP: 130/80   BP Location: Left arm   Patient Position: Sitting   BP Method: Manual   Pulse: 70   Temp: 97.7 °F (36.5 °C)   TempSrc: Oral   SpO2: 98%   Weight: 115.5 kg (254 lb 8.3 oz)   Height: 5' 5" (1.651 m)     Body mass index is 42.35 kg/(m^2).    Physical Exam   Constitutional: She is oriented to person, place, and time. She appears well-developed and well-nourished.   HENT:   Head: Normocephalic and atraumatic.   Eyes: EOM are normal.   Neck: Normal range of motion. Neck supple. No thyromegaly present.   No carotid bruits heard.   Cardiovascular: Normal rate, regular rhythm and normal heart sounds.  Exam reveals no gallop.    No murmur heard.  Pulmonary/Chest: Effort normal and breath sounds normal. No respiratory distress. She has no wheezes. She has no rales.   Abdominal: Soft. Bowel sounds are normal. She exhibits no distension. There is no tenderness. There is no rebound and no guarding.   Musculoskeletal: Normal range of motion. She exhibits edema.   2+ jony LE edema; spider veins; big boned..   Lymphadenopathy:     She has no cervical adenopathy.   Neurological: She is alert and oriented to person, place, and time.   Moves all 4 extremities fine.   Skin: No rash noted.   Psychiatric: She has a normal mood and affect. Her behavior is normal. Thought content normal.   Vitals reviewed.      Assessment:       1. Benign essential HTN    2. Coronary arteriosclerosis    3. Morbid obesity due to excess calories    4. Pure hypercholesterolemia    5. " Vitamin D deficiency    6. Bilateral leg edema    7. Statin intolerance        Plan:       Benign essential HTN. Maintain < 2 Gm Na a day diet, and monitor BP at home; keep a log. Cont BP meds.    Coronary arteriosclerosis; has been stable; sees Dr Rachel as her cardiologist.    Morbid obesity due to excess calories. Caloric restriction w regular exercise and weight reduction.    Pure hypercholesterolemia. Maintain low fat high fiber diet, exercise regularly. Cont Qunol 100 mg a day.  -     lovastatin (MEVACOR) 40 MG tablet; 2- 40 mg tabs nightly  Dispense: 60 tablet; Refill: 2    Vitamin D deficiency; levels therapeutic; same dosing.    Bilateral leg edema. Maintain less than 2 g sodium diet; elevate lower extremities at home; use compression stockings if possible.    Statin intolerance; see records; tolerating lovastatin so far.  -     lovastatin (MEVACOR) 40 MG tablet; 2- 40 mg tabs nightly  Dispense: 60 tablet; Refill: 2

## 2017-05-11 NOTE — PATIENT INSTRUCTIONS
[facility] for [facility] labs; obtain before f/u in 2 mos; increase lovastatin to 2- 40 mg tabs each night. Exercise regularly. Adhere to her diet; weight reduction attempts.

## 2017-05-11 NOTE — MR AVS SNAPSHOT
[facility]  [address]  [place] [address]  Phone: [phone]  Fax: [phone]                  Elicia Cavazos   2017 9:20 AM   Office Visit    Description:  Female : 1945   Provider:  Brett Garcia MD   Department:  [facility]           Reason for Visit     Follow-up           Diagnoses this Visit        Comments    Benign essential HTN    -  Primary     Coronary arteriosclerosis         Morbid obesity due to excess calories         Pure hypercholesterolemia         Vitamin D deficiency         Bilateral leg edema         Statin intolerance                To Do List           Future Appointments        Provider Department Dept Phone    2017 8:00 AM LAB, [place] [facility] [phone]    2017 9:20 AM Brett Garcia MD [facility] [phone]    2017 9:00 AM Marta Snowden MD [facility] [phone]      Goals (5 Years of Data)     None      Follow-Up and Disposition     Return in about 2 months (around 2017) for Reassessment and go over her labs.    Follow-up and Disposition History       These Medications        Disp Refills Start End    lovastatin (MEVACOR) 40 MG tablet 60 tablet 2 2017     2- 40 mg tabs nightly    Pharmacy: [facility]/pharmacy #[id number] - [place] - [address] Ph #: [phone]         [facility]     [facility] Care Line -  Assistance  Unless otherwise directed by your provider, please contact [facility], our nurse care line that is available for  assistance.     Registered nurses in the [facility] provide: appointment scheduling, clinical advisement, health education, and other advisory services.  Call: [phone] (toll free)               Medications           Message regarding Medications     Verify the changes and/or additions to your medication regime listed below are the same  as discussed with your clinician today.  If any of these changes or additions are incorrect, please notify your healthcare provider.        CHANGE how you are taking these medications     Start Taking Instead of    lovastatin (MEVACOR) 40 MG tablet lovastatin (MEVACOR) 40 MG tablet    Dosage:  2- 40 mg tabs nightly Dosage:  Take 1 tablet (40 mg total) by mouth every evening.    Reason for Change:  Reorder       STOP taking these medications     furosemide (LASIX) 20 MG tablet Take 20 mg by mouth once daily.           Verify that the below list of medications is an accurate representation of the medications you are currently taking.  If none reported, the list may be blank. If incorrect, please contact your healthcare provider. Carry this list with you in case of emergency.           Current Medications     acetaminophen (TYLENOL EXTRA STRENGTH) 500 MG tablet Take 500 mg by mouth 3 (three) times daily as needed for Pain.    ascorbic acid, vitamin C, (VITAMIN C) 500 MG tablet Take 500 mg by mouth once daily.    aspirin (ECOTRIN) 81 MG EC tablet Take 81 mg by mouth once daily.      calcium-vitamin D 500-125 mg-unit tablet Take 1 tablet by mouth once daily.      cetirizine (ZYRTEC) 10 MG tablet Take 10 mg by mouth once daily.    coenzyme Q10 400 mg Cap Take 100 mg by mouth once daily.     fish oil-omega-3 fatty acids 300-1,000 mg capsule Take 1,000 mg by mouth once daily.     GLUCOSAMINE HCL/CHONDR VILLASENOR A NA (OSTEO BI-FLEX ORAL) Take by mouth once daily.    lovastatin (MEVACOR) 40 MG tablet 2- 40 mg tabs nightly    methylcellulose, laxative, (CITRUCEL) 500 mg Tab Take by mouth once daily.    metoprolol succinate (TOPROL-XL) 50 MG 24 hr tablet Take 1 tablet (50 mg total) by mouth once daily.    multivitamin (MULTIVITAMIN) per tablet Take 1 tablet by mouth once daily.      nystatin (MYCOSTATIN) cream Apply topically 2 (two) times daily.    ramipril (ALTACE) 5 MG capsule Take 1 capsule (5 mg total) by mouth once daily.     "terconazole (TERAZOL 7) 0.4 % Crea Place 1 applicator vaginally every evening.    triamcinolone (NASACORT) 55 mcg nasal inhaler 2 sprays by Nasal route once daily.    vitamin D 185 MG Tab Take 500 mg by mouth once daily.     vitamin E 400 UNIT capsule Take 400 Units by mouth once daily.    VITAMIN E/FLAXSEED OIL (FLAXSEED OIL-VITAMIN E) 1,000-1 mg-unit Cap Take 1,000 mg by mouth once daily.      hydrocodone-acetaminophen 7.5-325mg (NORCO) 7.5-325 mg per tablet Take 1 tablet by mouth every 6 (six) hours as needed for Pain.           Clinical Reference Information           Your Vitals Were     BP Pulse Temp Height Weight SpO2    130/80 (BP Location: Left arm, Patient Position: Sitting, BP Method: Manual) 70 97.7 °F (36.5 °C) (Oral) 5' 5" (1.651 m) 115.5 kg (254 lb 8.3 oz) 98%    BMI                42.35 kg/m2          Blood Pressure          Most Recent Value    BP  130/80      Allergies as of 5/11/2017     Crestor [Rosuvastatin]    Lipitor [Atorvastatin]    Pravachol [Pravastatin]    Sulfa (Sulfonamide Antibiotics)    Welchol [Colesevelam]    Zocor [Simvastatin]      Immunizations Administered on Date of Encounter - 5/11/2017     None      Orders Placed During Today's Visit     Future Labs/Procedures Expected by Expires    CBC auto differential  5/11/2017 7/10/2018    Comprehensive metabolic panel  5/11/2017 7/10/2018    Magnesium  5/11/2017 7/10/2018      Instructions    [facility] for [facility]; obtain before f/u in 2 mos; increase lovastatin to 2- 40 mg tabs each night. Exercise regularly. Adhere to her diet; weight reduction attempts.       Language Assistance Services     ATTENTION: Language assistance services are available, free of charge. Please call [phone].      ATENCIÓN: Si barb worthy, tiene a pires disposición servicios gratuitos de asistencia lingüística. Llame al [phone].     CHÚ Ý: N?u b?n nói Ti?ng Vi?t, có các d?ch v? h? tr? ngôn ng? mi?n phí dành cho b?n. G?i s? [phone].         " [facility] complies with applicable Federal civil rights laws and does not discriminate on the basis of race, color, national origin, age, disability, or sex.

## 2017-05-29 RX ORDER — METOPROLOL SUCCINATE 50 MG/1
50 TABLET, EXTENDED RELEASE ORAL DAILY
Qty: 30 TABLET | Refills: 3 | Status: SHIPPED | OUTPATIENT
Start: 2017-05-29 | End: 2017-09-07 | Stop reason: SDUPTHER

## 2017-06-27 ENCOUNTER — TELEPHONE (OUTPATIENT)
Dept: ADMINISTRATIVE | Facility: HOSPITAL | Age: 72
End: 2017-06-27

## 2017-06-27 ENCOUNTER — PATIENT OUTREACH (OUTPATIENT)
Dept: ADMINISTRATIVE | Facility: HOSPITAL | Age: 72
End: 2017-06-27

## 2017-06-27 ENCOUNTER — LAB VISIT (OUTPATIENT)
Dept: LAB | Facility: HOSPITAL | Age: 72
End: 2017-06-27
Attending: INTERNAL MEDICINE
Payer: MEDICARE

## 2017-06-27 DIAGNOSIS — R60.0 BILATERAL LEG EDEMA: ICD-10-CM

## 2017-06-27 DIAGNOSIS — I10 BENIGN ESSENTIAL HTN: ICD-10-CM

## 2017-06-27 DIAGNOSIS — E78.00 PURE HYPERCHOLESTEROLEMIA: ICD-10-CM

## 2017-06-27 LAB
ALBUMIN SERPL BCP-MCNC: 3.4 G/DL
ALP SERPL-CCNC: 89 U/L
ALT SERPL W/O P-5'-P-CCNC: 10 U/L
ANION GAP SERPL CALC-SCNC: 7 MMOL/L
AST SERPL-CCNC: 14 U/L
BASOPHILS # BLD AUTO: 0.04 K/UL
BASOPHILS NFR BLD: 0.5 %
BILIRUB SERPL-MCNC: 0.4 MG/DL
BUN SERPL-MCNC: 15 MG/DL
CALCIUM SERPL-MCNC: 9.4 MG/DL
CHLORIDE SERPL-SCNC: 110 MMOL/L
CO2 SERPL-SCNC: 25 MMOL/L
CREAT SERPL-MCNC: 0.7 MG/DL
DIFFERENTIAL METHOD: ABNORMAL
EOSINOPHIL # BLD AUTO: 0.2 K/UL
EOSINOPHIL NFR BLD: 2.8 %
ERYTHROCYTE [DISTWIDTH] IN BLOOD BY AUTOMATED COUNT: 14.6 %
EST. GFR  (AFRICAN AMERICAN): >60 ML/MIN/1.73 M^2
EST. GFR  (NON AFRICAN AMERICAN): >60 ML/MIN/1.73 M^2
GLUCOSE SERPL-MCNC: 99 MG/DL
HCT VFR BLD AUTO: 36.7 %
HGB BLD-MCNC: 12.2 G/DL
LYMPHOCYTES # BLD AUTO: 2 K/UL
LYMPHOCYTES NFR BLD: 24.5 %
MAGNESIUM SERPL-MCNC: 1.9 MG/DL
MCH RBC QN AUTO: 27.9 PG
MCHC RBC AUTO-ENTMCNC: 33.2 %
MCV RBC AUTO: 84 FL
MONOCYTES # BLD AUTO: 0.5 K/UL
MONOCYTES NFR BLD: 6.8 %
NEUTROPHILS # BLD AUTO: 5.2 K/UL
NEUTROPHILS NFR BLD: 65.1 %
PLATELET # BLD AUTO: 230 K/UL
PMV BLD AUTO: 10.8 FL
POTASSIUM SERPL-SCNC: 4.2 MMOL/L
PROT SERPL-MCNC: 7.1 G/DL
RBC # BLD AUTO: 4.38 M/UL
SODIUM SERPL-SCNC: 142 MMOL/L
WBC # BLD AUTO: 8 K/UL

## 2017-06-27 PROCEDURE — 80053 COMPREHEN METABOLIC PANEL: CPT

## 2017-06-27 PROCEDURE — 36415 COLL VENOUS BLD VENIPUNCTURE: CPT | Mod: PO

## 2017-06-27 PROCEDURE — 83735 ASSAY OF MAGNESIUM: CPT

## 2017-06-27 PROCEDURE — 85025 COMPLETE CBC W/AUTO DIFF WBC: CPT

## 2017-06-27 NOTE — LETTER
June 27, 2017    Dr. Timothy Syed Jr.             [facility]  [address]  [facility] [address]  Phone: [phone] June 27, 2017     Patient: Elicia Cavazos    YOB: 1945   Date of Visit: 6/27/2017       To Whom It May Concern:    We are seeing Elicia Cavazos in the clinic at [facility].  Brett Garcia MD is their PCP.  She has an outstanding lab/procedure at the time we reviewed their chart.  To help with our Health Maintenance records will you please supply the following:     []  Mammogram                                [x]  Colonoscopy (done 12/2015)   []  Pap Smear                                                  []  Outside Lab Results   []  Dexa scans                                                   []  Eye Exam   []  Foot Exam                                                     [] Other___________   []  Outside Immunizations                             Please Fax to [facility] at .    Thank you for your help.  If my Care Coordinator can be of any assistance, you can call NICK LeeCCC at [phone].     If you have any questions or concerns, please don't hesitate to call.    Sincerely,        Brett Garcia MD

## 2017-07-05 RX ORDER — RAMIPRIL 5 MG/1
CAPSULE ORAL
Qty: 90 CAPSULE | Refills: 1 | Status: SHIPPED | OUTPATIENT
Start: 2017-07-05 | End: 2018-01-03 | Stop reason: SDUPTHER

## 2017-07-11 ENCOUNTER — HOSPITAL ENCOUNTER (OUTPATIENT)
Dept: RADIOLOGY | Facility: HOSPITAL | Age: 72
Discharge: HOME | End: 2017-07-11
Attending: INTERNAL MEDICINE
Payer: MEDICARE

## 2017-07-11 ENCOUNTER — OFFICE VISIT (OUTPATIENT)
Dept: FAMILY MEDICINE | Facility: CLINIC | Age: 72
End: 2017-07-11
Payer: MEDICARE

## 2017-07-11 VITALS
SYSTOLIC BLOOD PRESSURE: 130 MMHG | HEART RATE: 70 BPM | OXYGEN SATURATION: 97 % | TEMPERATURE: 98 F | HEIGHT: 65 IN | BODY MASS INDEX: 43.1 KG/M2 | WEIGHT: 258.69 LBS | DIASTOLIC BLOOD PRESSURE: 80 MMHG

## 2017-07-11 DIAGNOSIS — E66.01 MORBID OBESITY DUE TO EXCESS CALORIES: ICD-10-CM

## 2017-07-11 DIAGNOSIS — R60.0 BILATERAL LEG EDEMA: ICD-10-CM

## 2017-07-11 DIAGNOSIS — I10 BENIGN ESSENTIAL HTN: ICD-10-CM

## 2017-07-11 DIAGNOSIS — Z78.9 STATIN INTOLERANCE: ICD-10-CM

## 2017-07-11 DIAGNOSIS — T50.905A DRUG SIDE EFFECTS, INITIAL ENCOUNTER: ICD-10-CM

## 2017-07-11 DIAGNOSIS — E78.00 PURE HYPERCHOLESTEROLEMIA: Primary | ICD-10-CM

## 2017-07-11 DIAGNOSIS — I25.10 CORONARY ARTERIOSCLEROSIS: ICD-10-CM

## 2017-07-11 DIAGNOSIS — S80.01XA TRAUMATIC ECCHYMOSIS OF RIGHT KNEE, INITIAL ENCOUNTER: ICD-10-CM

## 2017-07-11 DIAGNOSIS — R73.02 IMPAIRED GLUCOSE TOLERANCE: ICD-10-CM

## 2017-07-11 PROCEDURE — 73560 X-RAY EXAM OF KNEE 1 OR 2: CPT | Mod: 26,59,LT, | Performed by: RADIOLOGY

## 2017-07-11 PROCEDURE — 1159F MED LIST DOCD IN RCRD: CPT | Mod: S$GLB,,, | Performed by: INTERNAL MEDICINE

## 2017-07-11 PROCEDURE — 1126F AMNT PAIN NOTED NONE PRSNT: CPT | Mod: S$GLB,,, | Performed by: INTERNAL MEDICINE

## 2017-07-11 PROCEDURE — 99999 PR PBB SHADOW E&M-EST. PATIENT-LVL III: CPT | Mod: PBBFAC,,, | Performed by: INTERNAL MEDICINE

## 2017-07-11 PROCEDURE — 99214 OFFICE O/P EST MOD 30 MIN: CPT | Mod: S$GLB,,, | Performed by: INTERNAL MEDICINE

## 2017-07-11 PROCEDURE — 73562 X-RAY EXAM OF KNEE 3: CPT | Mod: 26,RT,, | Performed by: RADIOLOGY

## 2017-07-11 PROCEDURE — 73560 X-RAY EXAM OF KNEE 1 OR 2: CPT | Mod: TC,PO,LT

## 2017-07-11 RX ORDER — EZETIMIBE 10 MG/1
10 TABLET ORAL DAILY
Qty: 30 TABLET | Refills: 2 | Status: SHIPPED | OUTPATIENT
Start: 2017-07-11 | End: 2019-03-11

## 2017-07-11 NOTE — PROGRESS NOTES
Subjective:       Patient ID: Elicia Cavazos is a 71 y.o. female.    Chief Complaint: Follow up labs    HPI  Pt here to go over her labs, and reassessment; since increasing lovastatin to 80 mg a day, she notes some myalgias, mild despite using Qunol 100 mg a day. Exercises 3x a week for 15 minutes. Try to increase including up to 25 minutes each time as well. On low fat high fiber diet. No chest pain, SOB, or palp; hx CAD w cardiologist Dr Rachel. LE edema about the same. HTN w avr /70, 135/80 as numbers; watches her salt intake. Hx of statin intolerance. Pt fell hitting her R knee 1 week ago; some swelling to knee; has decreased; bruising improving w ice and pt elevating her feet. Some tenderness to upper shin areas. No R knee pain. Total time: 7289-4817 hr; >50% time spent in discussion, and counseling, and review.    Review of Systems   Constitutional: Negative for appetite change, fever and unexpected weight change.   HENT: Negative for postnasal drip, rhinorrhea, sinus pressure and sneezing.          history of seasonal ALLERGIES    Eyes: Negative for discharge and itching.   Respiratory: Negative for cough, chest tightness, shortness of breath and wheezing.    Cardiovascular: Positive for leg swelling. Negative for chest pain and palpitations.        Mild edema LE's.   Gastrointestinal: Negative for abdominal distention, abdominal pain, blood in stool, constipation, diarrhea, nausea and vomiting.        Denies melena as well   Endocrine: Negative for polydipsia, polyphagia and polyuria.   Genitourinary: Negative for dysuria, hematuria and urgency.   Musculoskeletal: Positive for myalgias. Negative for arthralgias.   Skin: Negative for rash.   Allergic/Immunologic: Positive for environmental allergies. Negative for food allergies.        Denies seasonal or perennial ALLERGIES.   Neurological: Negative for tremors, seizures and syncope.   Hematological: Negative for adenopathy. Does not bruise/bleed easily.  "  Psychiatric/Behavioral:        Denies anxiety or depression       Objective:      Vitals:    07/11/17 0910   BP: 130/80   BP Location: Right arm   Patient Position: Sitting   BP Method: Manual   Pulse: 70   Temp: 98.2 °F (36.8 °C)   TempSrc: Oral   SpO2: 97%   Weight: 117.4 kg (258 lb 11.4 oz)   Height: 5' 5" (1.651 m)     Body mass index is 43.05 kg/m².    Physical Exam   Constitutional: She is oriented to person, place, and time. She appears well-developed and well-nourished.   HENT:   Head: Normocephalic and atraumatic.   Eyes: EOM are normal.   Neck: Normal range of motion. Neck supple. No thyromegaly present.   Cardiovascular: Normal rate and regular rhythm.  Exam reveals no gallop.    No murmur heard.  HOMER 3-4/6 aortic.   Pulmonary/Chest: Effort normal and breath sounds normal. No respiratory distress. She has no wheezes. She has no rales.   Abdominal: Soft. Bowel sounds are normal. She exhibits no distension. There is no tenderness. There is no rebound and no guarding.   Musculoskeletal: Normal range of motion. She exhibits edema.   Varicose veins to LE's; obese legs; suspect 2-3+ edema; ecchymosis to R knee and down her pretib to above ankles; crepitance and edema to knee; good ROM; tender to palp upper pretib w increased edema and ecchymosis to just below mid-pretib area, less below that to above ankles.   Lymphadenopathy:     She has no cervical adenopathy.   Neurological: She is alert and oriented to person, place, and time.   Moves all 4 extremities fine.   Skin: No rash noted.   Psychiatric: She has a normal mood and affect. Her behavior is normal. Thought content normal.   Vitals reviewed.      Assessment:       1. Benign essential HTN    2. Pure hypercholesterolemia    3. Drug side effects, initial encounter    4. Statin intolerance    5. Coronary arteriosclerosis    6. Impaired glucose tolerance    7. Morbid obesity due to excess calories    8. Bilateral leg edema    9. Traumatic ecchymosis of " right knee, initial encounter        Plan:       Benign essential HTN. Maintain < 2 Gm Na a day diet, and monitor BP at home; keep a log.    Pure hypercholesterolemia; decrease lovastatin to 20 mg q am; 40 mg q hs; to see if helps her myalgias. If needed may reduce further to 20 mg BID.  Cont Qunol 100 mg a day.  -     ezetimibe (ZETIA) 10 mg tablet; Take 1 tablet (10 mg total) by mouth once daily.  Dispense: 30 tablet; Refill: 2    Drug side effects, initial encounter; myalgias w lovastatin at 80 mg a day.    Statin intolerance noted in past. Increase fiber in diet; cont citrucel daily w low fat diet.    Coronary arteriosclerosis; has been stable; card Dr Rachel; next apt; next yr; seen March, and doing fine.  -     ezetimibe (ZETIA) 10 mg tablet; Take 1 tablet (10 mg total) by mouth once daily.  Dispense: 30 tablet; Refill: 2    Impaired glucose tolerance. Exercise recommended with weight reduction and low carb diet; we'll follow hemoglobin A1c's with you periodically.    Morbid obesity due to excess calories. Caloric restriction w regular exercise and weight reduction.    Bilateral leg edema. Maintain less than 2 g sodium diet; elevate lower extremities at home; use compression stockings if possible.    Traumatic ecchymosis R knee; hx OA knee; will check Xr's; change to thermal heat applicationd thru the day for resorption. Tylenol arthrits for pain.  Elevate her leg at home.

## 2017-08-25 ENCOUNTER — PATIENT OUTREACH (OUTPATIENT)
Dept: ADMINISTRATIVE | Facility: HOSPITAL | Age: 72
End: 2017-08-25

## 2017-08-29 ENCOUNTER — LAB VISIT (OUTPATIENT)
Dept: LAB | Facility: HOSPITAL | Age: 72
End: 2017-08-29
Attending: INTERNAL MEDICINE
Payer: MEDICARE

## 2017-08-29 DIAGNOSIS — R73.02 IMPAIRED GLUCOSE TOLERANCE: ICD-10-CM

## 2017-08-29 DIAGNOSIS — T50.905A DRUG SIDE EFFECTS, INITIAL ENCOUNTER: ICD-10-CM

## 2017-08-29 DIAGNOSIS — Z78.9 STATIN INTOLERANCE: ICD-10-CM

## 2017-08-29 DIAGNOSIS — E78.00 PURE HYPERCHOLESTEROLEMIA: ICD-10-CM

## 2017-08-29 DIAGNOSIS — E66.01 MORBID OBESITY DUE TO EXCESS CALORIES: ICD-10-CM

## 2017-08-29 DIAGNOSIS — I25.10 CORONARY ARTERIOSCLEROSIS: ICD-10-CM

## 2017-08-29 DIAGNOSIS — I10 BENIGN ESSENTIAL HTN: ICD-10-CM

## 2017-08-29 LAB
ALBUMIN SERPL BCP-MCNC: 3.5 G/DL
ALP SERPL-CCNC: 83 U/L
ALT SERPL W/O P-5'-P-CCNC: 11 U/L
ANION GAP SERPL CALC-SCNC: 8 MMOL/L
AST SERPL-CCNC: 15 U/L
BILIRUB SERPL-MCNC: 0.7 MG/DL
BUN SERPL-MCNC: 15 MG/DL
CALCIUM SERPL-MCNC: 9.7 MG/DL
CHLORIDE SERPL-SCNC: 107 MMOL/L
CHOLEST/HDLC SERPL: 4.6 {RATIO}
CK SERPL-CCNC: 53 U/L
CO2 SERPL-SCNC: 25 MMOL/L
CREAT SERPL-MCNC: 0.7 MG/DL
EST. GFR  (AFRICAN AMERICAN): >60 ML/MIN/1.73 M^2
EST. GFR  (NON AFRICAN AMERICAN): >60 ML/MIN/1.73 M^2
ESTIMATED AVG GLUCOSE: 105 MG/DL
GLUCOSE SERPL-MCNC: 92 MG/DL
HBA1C MFR BLD HPLC: 5.3 %
HDL/CHOLESTEROL RATIO: 21.7 %
HDLC SERPL-MCNC: 221 MG/DL
HDLC SERPL-MCNC: 48 MG/DL
LDLC SERPL CALC-MCNC: 155.6 MG/DL
NONHDLC SERPL-MCNC: 173 MG/DL
POTASSIUM SERPL-SCNC: 4 MMOL/L
PROT SERPL-MCNC: 7.4 G/DL
SODIUM SERPL-SCNC: 140 MMOL/L
T4 FREE SERPL-MCNC: 1.03 NG/DL
TRIGL SERPL-MCNC: 87 MG/DL
TSH SERPL DL<=0.005 MIU/L-ACNC: 1.49 UIU/ML

## 2017-08-29 PROCEDURE — 82550 ASSAY OF CK (CPK): CPT

## 2017-08-29 PROCEDURE — 36415 COLL VENOUS BLD VENIPUNCTURE: CPT | Mod: PO

## 2017-08-29 PROCEDURE — 80061 LIPID PANEL: CPT

## 2017-08-29 PROCEDURE — 80053 COMPREHEN METABOLIC PANEL: CPT

## 2017-08-29 PROCEDURE — 84443 ASSAY THYROID STIM HORMONE: CPT

## 2017-08-29 PROCEDURE — 83036 HEMOGLOBIN GLYCOSYLATED A1C: CPT

## 2017-08-29 PROCEDURE — 84439 ASSAY OF FREE THYROXINE: CPT

## 2017-08-31 ENCOUNTER — TELEPHONE (OUTPATIENT)
Dept: FAMILY MEDICINE | Facility: CLINIC | Age: 72
End: 2017-08-31

## 2017-08-31 RX ORDER — CIPROFLOXACIN 250 MG/1
250 TABLET, FILM COATED ORAL 2 TIMES DAILY
Qty: 6 TABLET | Refills: 0 | Status: SHIPPED | OUTPATIENT
Start: 2017-08-31 | End: 2017-09-03

## 2017-08-31 NOTE — TELEPHONE ENCOUNTER
Urine was notable for UTI. Positive Nitrites.   Will send oral abx. Cipro 250 mg BID x 3 days. Can repeat in 1 week.

## 2017-09-01 NOTE — TELEPHONE ENCOUNTER
Spoke with pt and informed her of urine was notable for UTI . Positive Nitrites. Script for Cipro 250 mg BID x 3 days was called into pharmacy. Pt verbally understands.

## 2017-09-08 RX ORDER — METOPROLOL SUCCINATE 50 MG/1
TABLET, EXTENDED RELEASE ORAL
Qty: 90 TABLET | Refills: 1 | Status: SHIPPED | OUTPATIENT
Start: 2017-09-08 | End: 2018-04-05 | Stop reason: SDUPTHER

## 2017-09-12 ENCOUNTER — TELEPHONE (OUTPATIENT)
Dept: FAMILY MEDICINE | Facility: CLINIC | Age: 72
End: 2017-09-12

## 2017-09-12 ENCOUNTER — OFFICE VISIT (OUTPATIENT)
Dept: FAMILY MEDICINE | Facility: CLINIC | Age: 72
End: 2017-09-12
Payer: MEDICARE

## 2017-09-12 VITALS
HEART RATE: 70 BPM | OXYGEN SATURATION: 96 % | WEIGHT: 254.19 LBS | TEMPERATURE: 98 F | BODY MASS INDEX: 42.35 KG/M2 | HEIGHT: 65 IN | SYSTOLIC BLOOD PRESSURE: 136 MMHG | DIASTOLIC BLOOD PRESSURE: 80 MMHG

## 2017-09-12 DIAGNOSIS — E66.01 MORBID OBESITY DUE TO EXCESS CALORIES: ICD-10-CM

## 2017-09-12 DIAGNOSIS — R73.02 IMPAIRED GLUCOSE TOLERANCE: ICD-10-CM

## 2017-09-12 DIAGNOSIS — N39.0 URINARY TRACT INFECTION WITHOUT HEMATURIA, SITE UNSPECIFIED: ICD-10-CM

## 2017-09-12 DIAGNOSIS — K63.5 POLYP OF COLON, UNSPECIFIED PART OF COLON, UNSPECIFIED TYPE: ICD-10-CM

## 2017-09-12 DIAGNOSIS — I25.10 CORONARY ARTERIOSCLEROSIS: ICD-10-CM

## 2017-09-12 DIAGNOSIS — I47.10 PSVT (PAROXYSMAL SUPRAVENTRICULAR TACHYCARDIA): ICD-10-CM

## 2017-09-12 DIAGNOSIS — I10 BENIGN ESSENTIAL HTN: Primary | ICD-10-CM

## 2017-09-12 DIAGNOSIS — R60.0 BILATERAL LEG EDEMA: ICD-10-CM

## 2017-09-12 DIAGNOSIS — E78.00 PURE HYPERCHOLESTEROLEMIA: ICD-10-CM

## 2017-09-12 LAB
BILIRUB SERPL-MCNC: NORMAL MG/DL
BLOOD URINE, POC: NORMAL
COLOR, POC UA: YELLOW
GLUCOSE UR QL STRIP: NORMAL
KETONES UR QL STRIP: NORMAL
LEUKOCYTE ESTERASE URINE, POC: NORMAL
NITRITE, POC UA: NORMAL
PH, POC UA: 6
PROTEIN, POC: NORMAL
SPECIFIC GRAVITY, POC UA: 1.01
UROBILINOGEN, POC UA: NORMAL

## 2017-09-12 PROCEDURE — 99215 OFFICE O/P EST HI 40 MIN: CPT | Mod: 25,S$GLB,, | Performed by: INTERNAL MEDICINE

## 2017-09-12 PROCEDURE — 3079F DIAST BP 80-89 MM HG: CPT | Mod: S$GLB,,, | Performed by: INTERNAL MEDICINE

## 2017-09-12 PROCEDURE — 81001 URINALYSIS AUTO W/SCOPE: CPT | Mod: S$GLB,,, | Performed by: INTERNAL MEDICINE

## 2017-09-12 PROCEDURE — 99999 PR PBB SHADOW E&M-EST. PATIENT-LVL IV: CPT | Mod: PBBFAC,,, | Performed by: INTERNAL MEDICINE

## 2017-09-12 PROCEDURE — 3075F SYST BP GE 130 - 139MM HG: CPT | Mod: S$GLB,,, | Performed by: INTERNAL MEDICINE

## 2017-09-12 PROCEDURE — 3008F BODY MASS INDEX DOCD: CPT | Mod: S$GLB,,, | Performed by: INTERNAL MEDICINE

## 2017-09-12 PROCEDURE — 1159F MED LIST DOCD IN RCRD: CPT | Mod: S$GLB,,, | Performed by: INTERNAL MEDICINE

## 2017-09-12 PROCEDURE — 1126F AMNT PAIN NOTED NONE PRSNT: CPT | Mod: S$GLB,,, | Performed by: INTERNAL MEDICINE

## 2017-09-12 RX ORDER — CHOLESTYRAMINE 4 G/9G
POWDER, FOR SUSPENSION ORAL
Qty: 378 G | Refills: 2 | Status: SHIPPED | OUTPATIENT
Start: 2017-09-12 | End: 2022-07-06 | Stop reason: SDUPTHER

## 2017-09-12 RX ORDER — CEFUROXIME AXETIL 500 MG/1
500 TABLET ORAL EVERY 12 HOURS
Qty: 10 TABLET | Refills: 0 | Status: SHIPPED | OUTPATIENT
Start: 2017-09-12 | End: 2017-10-23

## 2017-09-12 NOTE — PROGRESS NOTES
Subjective:       Patient ID: Elicia Cavazos is a 72 y.o. female.    Chief Complaint: Follow up labs    HPI  Here to go over her labs; discussed. HLP: on low fat high fiber diet; lost 4 lbs. On lovastatin, zetia, and fish oil. Tolerates them well. LE edema: has improved; watches her salt intake; elevates her legs at home. HTN: BP avr 135/82 at home; on metoprolol. Hx PSVT/CAD: no chest pain, SOB, or palp. Exercises 3-4x a week. 20 min avr. Recently tx for suspected UTI w cipro for 3 days. Pt w chills on urination; which has cleared; only sx's. Hx of colon benign polyps; last TC 1 1/2 yrs ago; few benign polyps removed. TC 3-5 yrs f/u. 1925-6126 TC due. Time: 120-225 pm. >50% time spent on counseling, discussion, and review. Labs discussed a s well and treatment for various dx's.    Review of Systems   Constitutional: Negative for appetite change, fever and unexpected weight change.   HENT: Negative for postnasal drip, rhinorrhea and sinus pressure.         Eyes history of seasonal ALLERGIES or perennial ALLERGIES   Eyes: Negative for discharge and itching.   Respiratory: Negative for cough, chest tightness, shortness of breath and wheezing.    Cardiovascular: Positive for leg swelling. Negative for chest pain and palpitations.        Improved.   Gastrointestinal: Negative for abdominal distention, abdominal pain, blood in stool, constipation, diarrhea, nausea and vomiting.        Denies melena as well   Endocrine: Negative for polydipsia, polyphagia and polyuria.   Genitourinary: Negative for dysuria, hematuria and urgency.   Musculoskeletal: Negative for arthralgias and myalgias.   Skin: Negative for rash.   Allergic/Immunologic: Positive for environmental allergies. Negative for food allergies.        Denies seasonal or perennial ALLERGIES.   Neurological: Negative for tremors, seizures and syncope.   Hematological: Negative for adenopathy. Does not bruise/bleed easily.   Psychiatric/Behavioral:        Denies anxiety  "or depression       Objective:      Vitals:    09/12/17 1250   BP: 136/80   BP Location: Right arm   Patient Position: Sitting   BP Method: Medium (Manual)   Pulse: 70   Temp: 98.4 °F (36.9 °C)   TempSrc: Oral   SpO2: 96%   Weight: 115.3 kg (254 lb 3.1 oz)   Height: 5' 5" (1.651 m)     Body mass index is 42.3 kg/m².    Physical Exam   Constitutional: She is oriented to person, place, and time. She appears well-developed and well-nourished.   HENT:   Head: Normocephalic and atraumatic.   Eyes: EOM are normal.   Neck: Normal range of motion. Neck supple. No thyromegaly present.   No carotid bruits heard.   Cardiovascular: Normal rate and regular rhythm.  Exam reveals no gallop.    No murmur heard.  HOMER 2-3/6 aortic area.   Pulmonary/Chest: Effort normal and breath sounds normal. No respiratory distress. She has no wheezes. She has no rales.   Abdominal: Soft. Bowel sounds are normal. She exhibits no distension. There is no tenderness. There is no rebound and no guarding.   Musculoskeletal: Normal range of motion. She exhibits edema.   2-3+ jony LE edema; spider veins; no calf tenderness to palp; obes LE's.   Lymphadenopathy:     She has no cervical adenopathy.   Neurological: She is alert and oriented to person, place, and time.   Moves all 4 extremities fine.   Skin: No rash noted.   Psychiatric: She has a normal mood and affect. Her behavior is normal. Thought content normal.   Vitals reviewed.      Assessment:       1. Benign essential HTN    2. Pure hypercholesterolemia    3. Coronary arteriosclerosis    4. Impaired glucose tolerance    5. PSVT (paroxysmal supraventricular tachycardia)    6. Morbid obesity due to excess calories    7. Bilateral leg edema    8. Urinary tract infection without hematuria, site unspecified    9. Polyp of colon, unspecified part of colon, unspecified type        Plan:       Benign essential HTN. Maintain < 2 Gm Na a day diet, and monitor BP at home; keep a log.    Pure " hypercholesterolemia. Maintain low fat high fiber diet, exercise regularly. Cont lovastatin, and zetia, and citrucel.     Add Questran 1 scoop/8 oz water daily; if tolerated after 1 week can increase to 2x a day; can use softener otc prn.     Increase her fish oil to 2x a day.    Coronary arteriosclerosis; has been stable; sees Dr [place]; EST next yr.     Impaired glucose tolerance; exercise w weight reduction. Recent HgA1C wnl.    PSVT (paroxysmal supraventricular tachycardia); limit her caffeine.    Morbid obesity due to excess calories. Caloric restriction w regular exercise and weight reduction.    Bilateral leg edema. Maintain less than 2 g sodium diet; elevate lower extremities at home; use compression stockings if possible.    Urinary tract infection without hematuria, site unspecified; ressolve w cipro short course; repeat U/A dip.  -     POCT URINE DIPSTICK WITH MICROSCOPE, AUTOMATED; still w 2+ leukocytes on UA; will check urine C/S and tx w ceftin 500 mg every 12 hrs for 5 days.    Benign colon polyps; cont ASA 81 mg a day; high fiber diet; TC due 4407-5504; need copy of Dr olea's records as he's retiring; including TC and path results.    Healthcare maintenance; needs to obtain records from [facility]; please send signed consent. Need DEXA results and immunizations records. To also check w [facility] for records.

## 2017-09-12 NOTE — TELEPHONE ENCOUNTER
Please notify patient that her urine dipstick had 2+ leukocytes.  In lieu of those results, recommend she take Ceftin 500 mg every 12 hours for a 5 day course.  Has been sent to her pharmacist which she needs to . She also needs to drop off a urine specimen for culture at [facility] lab via clean catch midstream specimen for  urine culture and sensitivity

## 2017-10-16 ENCOUNTER — TELEPHONE (OUTPATIENT)
Dept: FAMILY MEDICINE | Facility: CLINIC | Age: 72
End: 2017-10-16

## 2017-10-16 NOTE — TELEPHONE ENCOUNTER
----- Message from Jam Aguiar sent at 10/16/2017 10:42 AM CDT -----  Contact: Patient  Patient is calling to find out if Dr. Garcia received all of her records from Dr. Garcia's office from the [facility].  Call Back#[phone]  Thanks

## 2017-10-23 ENCOUNTER — OFFICE VISIT (OUTPATIENT)
Dept: FAMILY MEDICINE | Facility: CLINIC | Age: 72
End: 2017-10-23
Payer: MEDICARE

## 2017-10-23 VITALS
RESPIRATION RATE: 18 BRPM | WEIGHT: 252.88 LBS | TEMPERATURE: 98 F | HEIGHT: 65 IN | DIASTOLIC BLOOD PRESSURE: 86 MMHG | SYSTOLIC BLOOD PRESSURE: 120 MMHG | BODY MASS INDEX: 42.13 KG/M2 | HEART RATE: 55 BPM

## 2017-10-23 DIAGNOSIS — I47.10 PSVT (PAROXYSMAL SUPRAVENTRICULAR TACHYCARDIA): ICD-10-CM

## 2017-10-23 DIAGNOSIS — E78.00 PURE HYPERCHOLESTEROLEMIA: ICD-10-CM

## 2017-10-23 DIAGNOSIS — I10 BENIGN ESSENTIAL HTN: ICD-10-CM

## 2017-10-23 DIAGNOSIS — E55.9 VITAMIN D DEFICIENCY: ICD-10-CM

## 2017-10-23 DIAGNOSIS — E66.01 MORBID OBESITY: ICD-10-CM

## 2017-10-23 DIAGNOSIS — Z00.00 ENCOUNTER FOR PREVENTIVE HEALTH EXAMINATION: Primary | ICD-10-CM

## 2017-10-23 DIAGNOSIS — I25.10 CORONARY ARTERIOSCLEROSIS: ICD-10-CM

## 2017-10-23 DIAGNOSIS — R73.02 IMPAIRED GLUCOSE TOLERANCE: ICD-10-CM

## 2017-10-23 DIAGNOSIS — N76.0 VAGINITIS AND VULVOVAGINITIS: ICD-10-CM

## 2017-10-23 DIAGNOSIS — Z78.9 STATIN INTOLERANCE: ICD-10-CM

## 2017-10-23 PROBLEM — R60.0 BILATERAL LEG EDEMA: Status: RESOLVED | Noted: 2017-03-27 | Resolved: 2017-10-23

## 2017-10-23 PROCEDURE — G0439 PPPS, SUBSEQ VISIT: HCPCS | Mod: S$GLB,,, | Performed by: NURSE PRACTITIONER

## 2017-10-23 PROCEDURE — 99999 PR PBB SHADOW E&M-EST. PATIENT-LVL V: CPT | Mod: PBBFAC,,, | Performed by: NURSE PRACTITIONER

## 2017-10-23 NOTE — PATIENT INSTRUCTIONS
Counseling and Referral of Other Preventative  (Italic type indicates deductible and co-insurance are waived)    Patient Name: Elicia Cavazos  Today's Date: 10/23/2017      SERVICE LIMITATIONS RECOMMENDATION    Vaccines    · Pneumococcal (once after 65)    · Influenza (annually)    · Hepatitis B (if medium/high risk)    · Prevnar 13      Hepatitis B medium/high risk factors:       - End-stage renal disease       - Hemophiliacs who received Factor VII or         IX concentrates       - Clients of institutions for the mentally             retarded       - Persons who live in the same house as          a HepB carrier       - Homosexual men       - Illicit injectable drug abusers     Pneumococcal: Done, no repeat necessary     Influenza: N/A     Hepatitis B: N/A     Prevnar 13: Done, no repeat necessary    Mammogram (biennial age 50-74)  Annually (age 40 or over)  Scheduled, see appointments    Pap (up to age 70 and after 70 if unknown history or abnormal study last 10 years)    N/A     The USPSTF recommends against screening for cervical cancer in women older than age 65 years who have had adequate prior screening and are not otherwise at high risk for cervical cancer.      Colorectal cancer screening (to age 75)    · Fecal occult blood test (annual)  · Flexible sigmoidoscopy (5y)  · Screening colonoscopy (10y)  · Barium enema   Last done 2015, recommend to repeat every 5  years    Diabetes self-management training (no USPSTF recommendations)  Requires referral by treating physician for patient with diabetes or renal disease. 10 hours of initial DSMT sessions of no less than 30 minutes each in a continuous 12-month period. 2 hours of follow-up DSMT in subsequent years.  N/A    Bone mass measurements (age 65 & older, biennial)  Requires diagnosis related to osteoporosis or estrogen deficiency. Biennial benefit unless patient has history of long-term glucocorticoid  Last done 2015, recommend to repeat every 3  years     Glaucoma screening (no USPSTF recommendation)  Diabetes mellitus, family history   , age 50 or over    American, age 65 or over  Recommend follow up with eye care professional regularly    Medical nutrition therapy for diabetes or renal disease (no recommended schedule)  Requires referral by treating physician for patient with diabetes or renal disease or kidney transplant within the past 3 years.  Can be provided in same year as diabetes self-management training (DSMT), and CMS recommends medical nutrition therapy take place after DSMT. Up to 3 hours for initial year and 2 hours in subsequent years.  N/A    Cardiovascular screening blood tests (every 5 years)  · Fasting lipid panel  Order as a panel if possible  Done this year, repeat every year    Diabetes screening tests (at least every 3 years, Medicare covers annually or at 6-month intervals for prediabetic patients)  · Fasting blood sugar (FBS) or glucose tolerance test (GTT)  Patient must be diagnosed with one of the following:       - Hypertension       - Dyslipidemia       - Obesity (BMI 30kg/m2)       - Previous elevated impaired FBS or GTT       ... or any two of the following:       - Overweight (BMI 25 but <30)       - Family history of diabetes       - Age 65 or older       - History of gestational diabetes or birth of baby weighing more than 9 pounds  Done this year, repeat every year    HIV screening (annually for increased risk patients)  · HIV-1 and HIV-2 by EIA, or BETTY, rapid antibody test or oral mucosa transudate  Patients must be at increased risk for HIV infection per USPSTF guidelines or pregnant. Tests covered annually for patient at increased risk or as requested by the patient. Pregnant patients may receive up to 3 tests during pregnancy.  Risks discussed, screening is not recommended    Smoking cessation counseling (up to 8 sessions per year)  Patients must be asymptomatic of tobacco-related conditions to receive  as a preventative service.  Non-smoker    Subsequent annual wellness visit  At least 12 months since last AWV  Return in one year     The following information is provided to all patients.  This information is to help you find resources for any of the problems found today that may be affecting your health:                Living healthy guide: [email or web address].[email or web address].[facility]      Understanding Diabetes: www.diabetes.org      Eating healthy: www.cdc.gov/healthyweight      CDC home safety checklist: www.cdc.gov/steadi/patient.html      Agency on Aging: www.goea.[place].[facility]      Alcoholics anonymous (AA): www.aa.org      Physical Activity: www.robyn.nih.gov/rt5vmxz      Tobacco use: www.quitwithusla.org

## 2017-10-30 NOTE — PROGRESS NOTES
"Elicia Cavazos presented for a  Medicare AWV and comprehensive Health Risk Assessment today. The following components were reviewed and updated:    · Medical history  · Family History  · Social history  · Allergies and Current Medications  · Health Risk Assessment  · Health Maintenance  · Care Team     ** See Completed Assessments for Annual Wellness Visit within the encounter summary.**       The following assessments were completed:  · Living Situation  · CAGE  · Depression Screening  · Timed Get Up and Go  · Whisper Test  · Cognitive Function Screening          · Nutrition Screening  · ADL Screening  · PAQ Screening    Vitals:    10/23/17 0827   BP: 120/86   Pulse: (!) 55   Resp: 18   Temp: 98.1 °F (36.7 °C)   TempSrc: Oral   Weight: 114.7 kg (252 lb 13.9 oz)   Height: 5' 5" (1.651 m)     Body mass index is 42.08 kg/m².  Physical Exam   Constitutional: She is oriented to person, place, and time. She appears well-developed and well-nourished. No distress.   HENT:   Head: Normocephalic and atraumatic.   Neck: Carotid bruit is not present. No thyromegaly present.   Cardiovascular: Normal rate, regular rhythm and normal heart sounds.    No murmur heard.  Pulmonary/Chest: Effort normal and breath sounds normal. No respiratory distress. She has no wheezes.   Lymphadenopathy:     She has no cervical adenopathy.   Neurological: She is alert and oriented to person, place, and time.   Skin: Skin is warm and dry.   Psychiatric: She has a normal mood and affect. Her behavior is normal. Judgment and thought content normal.         Diagnoses and health risks identified today and associated recommendations/orders:    1. Encounter for preventive health examination  Reviewed health maintenance and provided recommendations        2. Coronary arteriosclerosis  Stable.   Controlled on current medications.  Followed by [facility].       3. PSVT (paroxysmal supraventricular tachycardia)  Stable.   Taking b-blocker  Followed by [facility].       4. " Pure hypercholesterolemia  Stable.   Taking statin  Followed by [facility].       5. Benign essential HTN  Stable.   Controlled on current medications.  Followed by [facility].       6. Impaired glucose tolerance  Continue to monitor   Followed by Brett Garcia MD .       7. Morbid obesity  Stable.   Encourage healthy food choices  Followed by Brett Garcia MD .       8. Vitamin D deficiency  Stable.   Taking vit d  Followed by Brett Garcia MD .       9. Statin intolerance  Stable.     Followed by Brett Garcia MD .       10. Vaginitis and vulvovaginitis  Stable.     Followed by Brett Garcia MD .         Provided Elicia with a 5-10 year written screening schedule and personal prevention plan. Recommendations were developed using the USPSTF age appropriate recommendations. Education, counseling, and referrals were provided as needed. After Visit Summary printed and given to patient which includes a list of additional screenings\tests needed.    Return in about 1 year (around 10/23/2018).    Alice Tobin NP

## 2017-11-02 ENCOUNTER — HOSPITAL ENCOUNTER (OUTPATIENT)
Dept: RADIOLOGY | Facility: HOSPITAL | Age: 72
Discharge: HOME | End: 2017-11-02
Attending: OBSTETRICS & GYNECOLOGY
Payer: MEDICARE

## 2017-11-02 DIAGNOSIS — Z12.31 VISIT FOR SCREENING MAMMOGRAM: ICD-10-CM

## 2017-11-02 PROCEDURE — 77067 SCR MAMMO BI INCL CAD: CPT | Mod: TC

## 2017-11-02 PROCEDURE — 77067 SCR MAMMO BI INCL CAD: CPT | Mod: 26,,, | Performed by: RADIOLOGY

## 2017-11-02 PROCEDURE — 77063 BREAST TOMOSYNTHESIS BI: CPT | Mod: 26,,, | Performed by: RADIOLOGY

## 2017-11-14 ENCOUNTER — OFFICE VISIT (OUTPATIENT)
Dept: FAMILY MEDICINE | Facility: CLINIC | Age: 72
End: 2017-11-14
Payer: MEDICARE

## 2017-11-14 VITALS
SYSTOLIC BLOOD PRESSURE: 130 MMHG | HEART RATE: 66 BPM | TEMPERATURE: 99 F | BODY MASS INDEX: 42.24 KG/M2 | OXYGEN SATURATION: 95 % | HEIGHT: 65 IN | WEIGHT: 253.5 LBS | DIASTOLIC BLOOD PRESSURE: 80 MMHG

## 2017-11-14 DIAGNOSIS — R73.02 IMPAIRED GLUCOSE TOLERANCE: ICD-10-CM

## 2017-11-14 DIAGNOSIS — Z78.9 STATIN INTOLERANCE: ICD-10-CM

## 2017-11-14 DIAGNOSIS — E55.9 VITAMIN D DEFICIENCY: ICD-10-CM

## 2017-11-14 DIAGNOSIS — I47.10 PSVT (PAROXYSMAL SUPRAVENTRICULAR TACHYCARDIA): ICD-10-CM

## 2017-11-14 DIAGNOSIS — I10 BENIGN ESSENTIAL HTN: ICD-10-CM

## 2017-11-14 DIAGNOSIS — I25.10 CORONARY ARTERIOSCLEROSIS: ICD-10-CM

## 2017-11-14 DIAGNOSIS — Z78.0 POST-MENOPAUSAL: ICD-10-CM

## 2017-11-14 DIAGNOSIS — E78.00 PURE HYPERCHOLESTEROLEMIA: Primary | ICD-10-CM

## 2017-11-14 DIAGNOSIS — K63.5 POLYP OF COLON, UNSPECIFIED PART OF COLON, UNSPECIFIED TYPE: ICD-10-CM

## 2017-11-14 DIAGNOSIS — M85.80 OSTEOPENIA, UNSPECIFIED LOCATION: ICD-10-CM

## 2017-11-14 PROCEDURE — 99214 OFFICE O/P EST MOD 30 MIN: CPT | Mod: S$GLB,,, | Performed by: INTERNAL MEDICINE

## 2017-11-14 PROCEDURE — 99999 PR PBB SHADOW E&M-EST. PATIENT-LVL V: CPT | Mod: PBBFAC,,, | Performed by: INTERNAL MEDICINE

## 2017-11-14 NOTE — PROGRESS NOTES
Subjective:       Patient ID: Elicia Cavazos is a 72 y.o. female.    Chief Complaint: Follow up labs    HPI   Here to go over her labs; overall she's been doing fine. HLP: on low fat high fiber diet; lovastatin, zetia, cholestyramine as well. Tolerates them fine. HTN: BP avr 130/80; watches her salt intake. Hx PSVT; no chest poain, SOB, or palp. Did fine w recent mammo; sees Dr Barger next week. UA w WBC pos; pt w no dysuria, foul odror, or frquency.  3/10/15 had DEXA scan with lumbar spine T- score -1.2 which was  compared to 10/30/12 when it was -0.9 ; hips for T score on 3/10/15 was -0.2.   12/18/15 total colonoscopy showed diverticula in the sigmoid, with polyps, one on the transverse, one in the  sigmoid and one in the rectum; hot biopsied or fulgurated.  1 in the transverse colon washeavily cauterized with possible adenomatous change. Repeat in 3 yrs.     Review of Systems   Constitutional: Negative for appetite change, fever and unexpected weight change.   HENT: Negative for congestion, postnasal drip, rhinorrhea and sinus pressure.         Eyes history of seasonal ALLERGIES or perennial ALLERGIES   Eyes: Negative for discharge and itching.   Respiratory: Negative for cough, chest tightness, shortness of breath and wheezing.    Cardiovascular: Negative for chest pain, palpitations and leg swelling.   Gastrointestinal: Negative for abdominal distention, abdominal pain, blood in stool, constipation, diarrhea, nausea and vomiting.        Denies melena as well   Endocrine: Negative for polydipsia, polyphagia and polyuria.   Genitourinary: Negative for dysuria and hematuria.   Musculoskeletal: Negative for arthralgias and myalgias.   Skin: Negative for rash.   Allergic/Immunologic: Negative for environmental allergies and food allergies.        Denies seasonal or perennial ALLERGIES.   Neurological: Negative for tremors, seizures and syncope.   Hematological: Negative for adenopathy. Does not bruise/bleed easily.  "  Psychiatric/Behavioral:        Denies anxiety or depression       Objective:      Vitals:    11/14/17 1303   BP: 130/80   BP Location: Left arm   Patient Position: Sitting   BP Method: Large (Manual)   Pulse: 66   Temp: 98.5 °F (36.9 °C)   TempSrc: Oral   SpO2: 95%   Weight: 115 kg (253 lb 8.5 oz)   Height: 5' 5" (1.651 m)     Body mass index is 42.19 kg/m².    Physical Exam   Constitutional: She is oriented to person, place, and time. She appears well-developed and well-nourished.   HENT:   Head: Normocephalic and atraumatic.   Eyes: EOM are normal.   Neck: Normal range of motion. Neck supple. No thyromegaly present.   Cardiovascular: Normal rate and regular rhythm.  Exam reveals no gallop.    No murmur heard.  SEM2-3/6 aortic   Pulmonary/Chest: Effort normal and breath sounds normal. No respiratory distress. She has no wheezes. She has no rales.   Abdominal: Soft. Bowel sounds are normal. She exhibits no distension. There is no tenderness. There is no rebound and no guarding.   Musculoskeletal: Normal range of motion. She exhibits edema.   Obese LE's w 2-3+ jony LE edema; no calf tenderness to palp.   Lymphadenopathy:     She has no cervical adenopathy.   Neurological: She is alert and oriented to person, place, and time.   Moves all 4 extremities fine.   Skin: No rash noted.   Psychiatric: She has a normal mood and affect. Her behavior is normal. Thought content normal.   Vitals reviewed.      Assessment:       1. Pure hypercholesterolemia    2. PSVT (paroxysmal supraventricular tachycardia)    3. Coronary arteriosclerosis    4. Benign essential HTN    5. Impaired glucose tolerance    6. Statin intolerance    7. Osteopenia, unspecified location    8. Post-menopausal    9. Vitamin D deficiency    10. Polyp of colon, unspecified part of colon, unspecified type        Plan:       Pure hypercholesterolemia. Maintain low fat high fiber diet, exercise regularly. Cont her chol meds; add citrucel 2x a day.  -     Lipid " panel; Future; Expected date: 11/14/2017  -     Comprehensive metabolic panel; Future; Expected date: 11/14/2017    PSVT (paroxysmal supraventricular tachycardia); limit her caffeine.    Coronary arteriosclerosis; has been stable; sees Dr Manchester; EST next yr.    Benign essential HTN. Maintain < 2 Gm Na a day diet, and monitor BP at home; keep a log.  -     Comprehensive metabolic panel; Future; Expected date: 11/14/2017    Impaired glucose tolerance. Exercise recommended with weight reduction and low carb diet; we'll follow hemoglobin A1c's with you periodically.  -     Hemoglobin A1c; Future; Expected date: 11/14/2017  -     Comprehensive metabolic panel; Future; Expected date: 11/14/2017    Statin intolerance    Osteopenia, unspecified location. Weight bearing exercises, continue calcium and vit D supplements. DEXA scan past due. Last 3/10/15.  -     Vitamin D; Future; Expected date: 11/28/2017  -     DXA Bone Density Spine And Hip; Future; Expected date: 11/14/2017    Post-menopausal  -     DXA Bone Density Spine And Hip; Future; Expected date: 11/14/2017    Vitamin D deficiency; cont her supplements.  -     Vitamin D; Future; Expected date: 11/28/2017    Colon polyps;  Pas ttotal colonoscopy 12/18/15 by Dr. Syed reportedly polyps noted;one in the transverse; one in the sigmoid; 1 in the rectum; hot biopsied or fulgurated.  Transverse colon pathology raises the possibility of adnomatous  Changes. Repeat 12/18/18.

## 2017-11-14 NOTE — PATIENT INSTRUCTIONS
Pure hypercholesterolemia. Maintain low fat high fiber diet, exercise regularly. Cont her chol meds; add citrucel 2x a day.  -     Lipid panel; Future; Expected date: 11/14/2017  -     Comprehensive metabolic panel; Future; Expected date: 11/14/2017    PSVT (paroxysmal supraventricular tachycardia); limit her caffeine.    Coronary arteriosclerosis; has been stable; sees Dr Fairhaven; EST next yr.    Benign essential HTN. Maintain < 2 Gm Na a day diet, and monitor BP at home; keep a log.  -     Comprehensive metabolic panel; Future; Expected date: 11/14/2017    Impaired glucose tolerance. Exercise recommended with weight reduction and low carb diet; we'll follow hemoglobin A1c's with you periodically.  -     Hemoglobin A1c; Future; Expected date: 11/14/2017  -     Comprehensive metabolic panel; Future; Expected date: 11/14/2017    Statin intolerance    Osteopenia, unspecified location. Weight bearing exercises, continue calcium and vit D supplements. DEXA scan past due. Last 3/10/15.  -     Vitamin D; Future; Expected date: 11/28/2017  -     DXA Bone Density Spine And Hip; Future; Expected date: 11/14/2017    Post-menopausal  -     DXA Bone Density Spine And Hip; Future; Expected date: 11/14/2017    Vitamin D deficiency; cont her supplements.  -     Vitamin D; Future; Expected date: 11/28/2017    Return to see me in 3 mos. Obtain labs 10 days before apt and DEXA

## 2017-11-30 ENCOUNTER — TELEPHONE (OUTPATIENT)
Dept: FAMILY MEDICINE | Facility: CLINIC | Age: 72
End: 2017-11-30

## 2017-11-30 NOTE — TELEPHONE ENCOUNTER
Tried to reach pt. No answer, left msg to call back.    Contacting to see why mervat had contacted pt.

## 2017-11-30 NOTE — TELEPHONE ENCOUNTER
----- Message from Digna Keane sent at 11/30/2017 11:44 AM CST -----  Contact: pt  Pt states that she just received a call from Nurse Sandoval and she is returning the call....[phone]

## 2017-12-01 NOTE — TELEPHONE ENCOUNTER
Tried to reach pt. No answer, left msg to call back.    Contacting to see what the message from mervat was.

## 2017-12-04 NOTE — TELEPHONE ENCOUNTER
Call was made to Mrs Cavazos due to was trying to reach Michael Cavazos and that was one of the numbers provided in his chart. Spoke with Michael Jacinto already addressed.

## 2018-01-03 RX ORDER — RAMIPRIL 5 MG/1
CAPSULE ORAL
Qty: 90 CAPSULE | Refills: 1 | Status: SHIPPED | OUTPATIENT
Start: 2018-01-03 | End: 2018-07-09 | Stop reason: SDUPTHER

## 2018-02-06 ENCOUNTER — PATIENT OUTREACH (OUTPATIENT)
Dept: ADMINISTRATIVE | Facility: HOSPITAL | Age: 73
End: 2018-02-06

## 2018-02-12 ENCOUNTER — HOSPITAL ENCOUNTER (OUTPATIENT)
Dept: RADIOLOGY | Facility: HOSPITAL | Age: 73
Discharge: HOME | End: 2018-02-12
Attending: INTERNAL MEDICINE
Payer: MEDICARE

## 2018-02-12 DIAGNOSIS — M85.80 OSTEOPENIA, UNSPECIFIED LOCATION: ICD-10-CM

## 2018-02-12 DIAGNOSIS — Z78.0 POST-MENOPAUSAL: ICD-10-CM

## 2018-02-12 PROCEDURE — 77080 DXA BONE DENSITY AXIAL: CPT | Mod: TC,PO

## 2018-02-12 PROCEDURE — 77080 DXA BONE DENSITY AXIAL: CPT | Mod: 26,,, | Performed by: RADIOLOGY

## 2018-02-20 ENCOUNTER — OFFICE VISIT (OUTPATIENT)
Dept: FAMILY MEDICINE | Facility: CLINIC | Age: 73
End: 2018-02-20
Payer: MEDICARE

## 2018-02-20 VITALS
WEIGHT: 249.56 LBS | BODY MASS INDEX: 41.58 KG/M2 | TEMPERATURE: 99 F | HEART RATE: 70 BPM | SYSTOLIC BLOOD PRESSURE: 132 MMHG | OXYGEN SATURATION: 96 % | HEIGHT: 65 IN | DIASTOLIC BLOOD PRESSURE: 80 MMHG

## 2018-02-20 DIAGNOSIS — Z78.9 STATIN INTOLERANCE: ICD-10-CM

## 2018-02-20 DIAGNOSIS — I10 BENIGN ESSENTIAL HTN: ICD-10-CM

## 2018-02-20 DIAGNOSIS — E66.01 MORBID OBESITY: ICD-10-CM

## 2018-02-20 DIAGNOSIS — R73.02 IMPAIRED GLUCOSE TOLERANCE: ICD-10-CM

## 2018-02-20 DIAGNOSIS — Z87.39 HISTORY OF OSTEOPENIA: ICD-10-CM

## 2018-02-20 DIAGNOSIS — E78.00 PURE HYPERCHOLESTEROLEMIA: Primary | ICD-10-CM

## 2018-02-20 DIAGNOSIS — I25.10 CORONARY ARTERIOSCLEROSIS: ICD-10-CM

## 2018-02-20 PROCEDURE — 1126F AMNT PAIN NOTED NONE PRSNT: CPT | Mod: S$GLB,,, | Performed by: INTERNAL MEDICINE

## 2018-02-20 PROCEDURE — 1159F MED LIST DOCD IN RCRD: CPT | Mod: S$GLB,,, | Performed by: INTERNAL MEDICINE

## 2018-02-20 PROCEDURE — 99214 OFFICE O/P EST MOD 30 MIN: CPT | Mod: S$GLB,,, | Performed by: INTERNAL MEDICINE

## 2018-02-20 PROCEDURE — 3008F BODY MASS INDEX DOCD: CPT | Mod: S$GLB,,, | Performed by: INTERNAL MEDICINE

## 2018-02-20 PROCEDURE — 99999 PR PBB SHADOW E&M-EST. PATIENT-LVL III: CPT | Mod: PBBFAC,,, | Performed by: INTERNAL MEDICINE

## 2018-02-20 RX ORDER — PITAVASTATIN CALCIUM 2.09 MG/1
2 TABLET, FILM COATED ORAL DAILY
Qty: 30 TABLET | Refills: 2 | Status: SHIPPED | OUTPATIENT
Start: 2018-02-20 | End: 2018-07-12

## 2018-02-20 NOTE — PATIENT INSTRUCTIONS
Pure hypercholesterolemia. Maintain low fat high fiber diet, exercise regularly. Change lovastatin to pitavastatin 2 mg q hs.       Cont other chol meds for now.    Statin intolerance; as myalgias; willing to try pitavastatin. Qunol 100 mg a day to continue.    Coronary arteriosclerosis; sees Dr Rachel as her cardiologist; has been stable; EST to be done.     Benign essential HTN. Maintain < 2 Gm Na a day diet, and monitor BP at home; keep a log. On altace and metoprolol.    Impaired glucose tolerance. Exercise recommended with weight reduction and low carb diet; we'll follow hemoglobin A1c's with you periodically.    Morbid obesity. Caloric restriction w regular exercise and weight reduction.    Hx of Osteopenia; recent DEXA wnl but T score L fem neck was -0.9. Repeat in 2-3 yrs. Vit D therapeutic.Weight bearing exercises,       continue calcium and vit D supplements.

## 2018-02-20 NOTE — PROGRESS NOTES
Subjective:       Patient ID: Elicia Cavazos is a 72 y.o. female.    Chief Complaint: Follow-up (labs)    HPI  Here to go over her labs and for reassessment. HLP: on low fat high fiber diet or tries; tolerates lovastatin/zetia, fish oil, and questran. CAD; no chest pain or SOB, also no palp. Sees Dr Rachel as her cardiologist; EST next week. HTN: BP avr 138/80, on altace w metoprolol. Impaired glucose tolerance. Exercises 3x a week w walking mostly. BMI 41.53. Weight starting to come down by pt some.  Labs reviewed w pt at length. Labs ordered for f/u. DEXA reviewed w pt; wnl; L fem neck w T score -0.9. Dexa in 2-3 yrs f/u. Time: 4297-7190. >50% time spent on discussion, counseling, and review.    Review of Systems   Constitutional: Negative for appetite change, chills, fever and unexpected weight change.   HENT: Negative for congestion, postnasal drip, rhinorrhea and sinus pressure.    Eyes: Negative for discharge and itching.   Respiratory: Negative for cough, chest tightness, shortness of breath and wheezing.    Cardiovascular: Negative for chest pain, palpitations and leg swelling.   Gastrointestinal: Negative for abdominal distention, abdominal pain, blood in stool, constipation, diarrhea, nausea and vomiting.   Endocrine: Negative for polydipsia, polyphagia and polyuria.        Nocturia 2x a night. 1 Cup of coffee a morning.   Genitourinary: Negative for dysuria, hematuria and urgency.   Musculoskeletal: Negative for arthralgias and myalgias.   Skin: Negative for rash.   Allergic/Immunologic: Negative for environmental allergies and food allergies.   Neurological: Negative for tremors, seizures and syncope.   Hematological: Negative for adenopathy. Does not bruise/bleed easily.   Psychiatric/Behavioral:        Denies anxiety or depression.       Objective:      Vitals:    02/20/18 1302   BP: 132/80   BP Location: Left arm   Patient Position: Sitting   BP Method: Large (Manual)   Pulse: 70   Temp: 98.5 °F (36.9 °C)  "  TempSrc: Oral   SpO2: 96%   Weight: 113.2 kg (249 lb 9 oz)   Height: 5' 5" (1.651 m)     Body mass index is 41.53 kg/m².    Physical Exam   Constitutional: She is oriented to person, place, and time. She appears well-developed and well-nourished.   HENT:   Head: Normocephalic and atraumatic.   Eyes: EOM are normal.   Neck: Normal range of motion. Neck supple. No thyromegaly present.   No carotid bruits heard.   Cardiovascular: Normal rate and regular rhythm.  Exam reveals no gallop.    No murmur heard.  HOMER 2/6 aortic.   Pulmonary/Chest: Effort normal and breath sounds normal. No respiratory distress. She has no wheezes. She has no rales.   Abdominal: Soft. Bowel sounds are normal. She exhibits no distension. There is no tenderness. There is no rebound and no guarding.   Musculoskeletal: Normal range of motion. She exhibits no edema.   Lymphadenopathy:     She has no cervical adenopathy.   Neurological: She is alert and oriented to person, place, and time.   Moves all 4 extremities fine.   Skin: No rash noted.   Psychiatric: She has a normal mood and affect. Her behavior is normal. Thought content normal.   Vitals reviewed.      Assessment:       1. Pure hypercholesterolemia    2. Statin intolerance    3. Coronary arteriosclerosis    4. Benign essential HTN    5. Impaired glucose tolerance    6. Morbid obesity    7. History of osteopenia        Plan:       Pure hypercholesterolemia. Maintain low fat high fiber diet, exercise regularly. Change lovastatin to pitavastatin 2 mg q hs.       Cont other chol meds for now.    Statin intolerance; as myalgias; willing to try pitavastatin. Qunol 100 mg a day to continue.    Coronary arteriosclerosis; sees Dr Rachel as her cardiologist; has been stable; EST to be done.     Benign essential HTN. Maintain < 2 Gm Na a day diet, and monitor BP at home; keep a log. On altace and metoprolol.    Impaired glucose tolerance. Exercise recommended with weight reduction and low carb diet; " we'll follow hemoglobin A1c's with you periodically.    Morbid obesity. Caloric restriction w regular exercise and weight reduction.    Hx of Osteopenia; recent DEXA wnl but T score L fem neck was -0.9. Repeat in 2-3 yrs. Vit D therapeutic.Weight bearing exercises,       continue calcium and vit D supplements.

## 2018-02-23 ENCOUNTER — TELEPHONE (OUTPATIENT)
Dept: FAMILY MEDICINE | Facility: CLINIC | Age: 73
End: 2018-02-23

## 2018-02-23 NOTE — TELEPHONE ENCOUNTER
----- Message from RT Candelario sent at 2/23/2018  1:54 PM CST -----  Contact: pt   pt , requesting a call back soon from Nurse Sandoval concerning a prescription (pitavastatin) not covered by her insurance, thanks.   No

## 2018-02-23 NOTE — TELEPHONE ENCOUNTER
Spoke with pharmacy and they said that Livalo is not covered under pt insurance plan and that she has Crestor,Lipitor,Pravachol and Zocor listed as allergy/adverse reaction.    Please review and advise.     Thank you.

## 2018-02-23 NOTE — TELEPHONE ENCOUNTER
----- Message from David Aguilera sent at 2/23/2018  2:24 PM CST -----  Contact: same  Unsuccessful call placed to office.  Patient called in and stated she has been playing phone tag with Lori about her Rx medication that is not covered by her insurance and needs a replacement. Patient call back number is [phone]  Patient stated she had already forwarded all of the necessary information to office earlier.

## 2018-02-26 ENCOUNTER — TELEPHONE (OUTPATIENT)
Dept: FAMILY MEDICINE | Facility: CLINIC | Age: 73
End: 2018-02-26

## 2018-02-26 ENCOUNTER — DOCUMENTATION ONLY (OUTPATIENT)
Dept: FAMILY MEDICINE | Facility: CLINIC | Age: 73
End: 2018-02-26

## 2018-02-26 NOTE — TELEPHONE ENCOUNTER
Lori, just to let you know I discussed the case with Ms Cavazos by phone.  A prior authorization apparently has been started by you which is appreciated.  In helping, she's tried Crestor, Lipitor, and Zocor and presently is on lovastatin, with all 4 having caused myalgias.  She is also on Questran and Zetia, and fish oil. She still needs further reduction of her lipids.  Have advised her to reduce the lovastatin dose 50%, while waiting for prior authorization to come through to help her myalgias.. She is also on CoQ10.  His let me know if I can be of any further assistance

## 2018-02-26 NOTE — PROGRESS NOTES
Discussed with patient by phone and she's noted that she's tried Crestor, Lipitor, and Zocor prior and has had muscle problems w all 3.  Presently on lovastatin with it now starting to give her myalgias despite taking CoQ10.  She is also on Questran and Zetia; have advised her to reduce the lovastatin dose 50% and give prior authorization which Lori Garcia my medical assistant is working on right now with the insurance company for Livalo/pitavastatin and check back with us in several days regarding the status of prior authorization w her insurance company.

## 2018-02-27 NOTE — TELEPHONE ENCOUNTER
Spoke with pt and she wanted to clarify what  had told her last night on the phone.     Went over Dr. Garcia notes with pt.     Pt verbally understands.

## 2018-02-27 NOTE — TELEPHONE ENCOUNTER
----- Message from Syl Sotomayor sent at 2/27/2018  8:06 AM CST -----  Contact: self  Patient called asking for advice about her medication. Patient needs to verify some information.  Please call patient at [phone]. Thanks!

## 2018-04-05 RX ORDER — METOPROLOL SUCCINATE 50 MG/1
TABLET, EXTENDED RELEASE ORAL
Qty: 90 TABLET | Refills: 1 | Status: SHIPPED | OUTPATIENT
Start: 2018-04-05 | End: 2018-10-05 | Stop reason: SDUPTHER

## 2018-05-14 ENCOUNTER — LAB VISIT (OUTPATIENT)
Dept: LAB | Facility: HOSPITAL | Age: 73
End: 2018-05-14
Attending: INTERNAL MEDICINE
Payer: MEDICARE

## 2018-05-14 DIAGNOSIS — I10 BENIGN ESSENTIAL HTN: ICD-10-CM

## 2018-05-14 DIAGNOSIS — R73.02 IMPAIRED GLUCOSE TOLERANCE: ICD-10-CM

## 2018-05-14 DIAGNOSIS — Z78.9 STATIN INTOLERANCE: ICD-10-CM

## 2018-05-14 DIAGNOSIS — E78.00 PURE HYPERCHOLESTEROLEMIA: ICD-10-CM

## 2018-05-14 LAB
ALBUMIN SERPL BCP-MCNC: 3.6 G/DL
ALP SERPL-CCNC: 87 U/L
ALT SERPL W/O P-5'-P-CCNC: 10 U/L
ANION GAP SERPL CALC-SCNC: 5 MMOL/L
AST SERPL-CCNC: 14 U/L
BILIRUB SERPL-MCNC: 0.5 MG/DL
BUN SERPL-MCNC: 19 MG/DL
CALCIUM SERPL-MCNC: 9.8 MG/DL
CHLORIDE SERPL-SCNC: 105 MMOL/L
CHOLEST SERPL-MCNC: 226 MG/DL
CHOLEST/HDLC SERPL: 4.6 {RATIO}
CO2 SERPL-SCNC: 30 MMOL/L
CREAT SERPL-MCNC: 0.7 MG/DL
EST. GFR  (AFRICAN AMERICAN): >60 ML/MIN/1.73 M^2
EST. GFR  (NON AFRICAN AMERICAN): >60 ML/MIN/1.73 M^2
GLUCOSE SERPL-MCNC: 101 MG/DL
HDLC SERPL-MCNC: 49 MG/DL
HDLC SERPL: 21.7 %
LDLC SERPL CALC-MCNC: 156 MG/DL
MAGNESIUM SERPL-MCNC: 2 MG/DL
NONHDLC SERPL-MCNC: 177 MG/DL
POTASSIUM SERPL-SCNC: 4.2 MMOL/L
PROT SERPL-MCNC: 7.4 G/DL
SODIUM SERPL-SCNC: 140 MMOL/L
TRIGL SERPL-MCNC: 105 MG/DL

## 2018-05-14 PROCEDURE — 83735 ASSAY OF MAGNESIUM: CPT

## 2018-05-14 PROCEDURE — 36415 COLL VENOUS BLD VENIPUNCTURE: CPT | Mod: PO

## 2018-05-14 PROCEDURE — 80061 LIPID PANEL: CPT

## 2018-05-14 PROCEDURE — 80053 COMPREHEN METABOLIC PANEL: CPT

## 2018-05-15 ENCOUNTER — TELEPHONE (OUTPATIENT)
Dept: FAMILY MEDICINE | Facility: CLINIC | Age: 73
End: 2018-05-15

## 2018-05-15 DIAGNOSIS — N30.90 CYSTITIS: Primary | ICD-10-CM

## 2018-05-15 RX ORDER — CEFUROXIME AXETIL 500 MG/1
500 TABLET ORAL 2 TIMES DAILY
Qty: 14 TABLET | Refills: 0 | Status: SHIPPED | OUTPATIENT
Start: 2018-05-15 | End: 2018-05-22

## 2018-05-16 NOTE — TELEPHONE ENCOUNTER
Spoke with pt and informed her that Ceftin 500 mg was called into pharmacy to cover UTI. She is to push fluids daily. She is to pass by the lab to give a clean catch midstream specimen for urine culture.     Pt verbally understands and will stop by [facility] to drop off urine.

## 2018-05-16 NOTE — TELEPHONE ENCOUNTER
Please notify patient that we have sent in Ceftin at 500 mg twice a day for 1 week to cover for urinary tract infection.  She is to push fluids during this time.  She needs to pass by the lab to give a clean catch midstream specimen for urine culture, so we can identify the bacteria. Continue nystatin for her rash.  Schedule a follow-up appointment for reassessment if she doesn't have one coming up shortly

## 2018-05-24 ENCOUNTER — LAB VISIT (OUTPATIENT)
Dept: LAB | Facility: HOSPITAL | Age: 73
End: 2018-05-24
Attending: INTERNAL MEDICINE
Payer: MEDICARE

## 2018-05-24 DIAGNOSIS — N30.90 CYSTITIS: ICD-10-CM

## 2018-05-24 PROCEDURE — 87086 URINE CULTURE/COLONY COUNT: CPT

## 2018-05-25 LAB
BACTERIA UR CULT: NORMAL
BACTERIA UR CULT: NORMAL

## 2018-07-10 RX ORDER — RAMIPRIL 5 MG/1
CAPSULE ORAL
Qty: 90 CAPSULE | Refills: 1 | Status: SHIPPED | OUTPATIENT
Start: 2018-07-10 | End: 2018-10-15

## 2018-07-12 ENCOUNTER — OFFICE VISIT (OUTPATIENT)
Dept: FAMILY MEDICINE | Facility: CLINIC | Age: 73
End: 2018-07-12
Payer: MEDICARE

## 2018-07-12 VITALS
HEIGHT: 65 IN | HEART RATE: 72 BPM | DIASTOLIC BLOOD PRESSURE: 72 MMHG | TEMPERATURE: 98 F | SYSTOLIC BLOOD PRESSURE: 132 MMHG | BODY MASS INDEX: 41.57 KG/M2 | WEIGHT: 249.5 LBS

## 2018-07-12 DIAGNOSIS — I25.10 CORONARY ARTERIOSCLEROSIS: ICD-10-CM

## 2018-07-12 DIAGNOSIS — R31.29 MICROSCOPIC HEMATURIA: ICD-10-CM

## 2018-07-12 DIAGNOSIS — E66.01 MORBID OBESITY: ICD-10-CM

## 2018-07-12 DIAGNOSIS — R73.02 IMPAIRED GLUCOSE TOLERANCE: ICD-10-CM

## 2018-07-12 DIAGNOSIS — I10 BENIGN ESSENTIAL HTN: Primary | ICD-10-CM

## 2018-07-12 DIAGNOSIS — E78.00 PURE HYPERCHOLESTEROLEMIA: ICD-10-CM

## 2018-07-12 DIAGNOSIS — Z86.79 HISTORY OF PSVT (PAROXYSMAL SUPRAVENTRICULAR TACHYCARDIA): ICD-10-CM

## 2018-07-12 DIAGNOSIS — Z78.9 STATIN INTOLERANCE: ICD-10-CM

## 2018-07-12 PROCEDURE — 99214 OFFICE O/P EST MOD 30 MIN: CPT | Mod: S$GLB,,, | Performed by: INTERNAL MEDICINE

## 2018-07-12 PROCEDURE — 3078F DIAST BP <80 MM HG: CPT | Mod: CPTII,S$GLB,, | Performed by: INTERNAL MEDICINE

## 2018-07-12 PROCEDURE — 99999 PR PBB SHADOW E&M-EST. PATIENT-LVL III: CPT | Mod: PBBFAC,,, | Performed by: INTERNAL MEDICINE

## 2018-07-12 PROCEDURE — 3075F SYST BP GE 130 - 139MM HG: CPT | Mod: CPTII,S$GLB,, | Performed by: INTERNAL MEDICINE

## 2018-07-12 NOTE — PATIENT INSTRUCTIONS
Benign essential HTN. Maintain < 2 Gm Na a day diet, and monitor BP at home; keep a log. Same management.    Pure hypercholesterolemia. Maintain low fat high fiber diet, exercise regularly. Cont krill but increase to 1000 mg 2x a day;       increase cholestyramine to 4 gm 2x a day.    Statin intolerance; cont zetia.    Coronary arteriosclerosis; has been stable; sees Dr Rachel as her card.    Morbid obesity. Caloric restriction w regular exercise and weight reduction.    Impaired glucose tolerance. Exercise recommended with weight reduction and low carb diet; we'll follow hemoglobin A1c's with you periodically.    History of PSVT (paroxysmal supraventricular tachycardia); limit caffeine. On metoprolol.    Microscopic hematuria; repeat UA dip w micro.

## 2018-07-12 NOTE — PROGRESS NOTES
Subjective:       Patient ID: Elicia Cavazos is a 72 y.o. female.    Chief Complaint: Follow-up    HPI  Overall doing fine.    Obesity; exercises 2x a week lately; but watching grand-kids.  BMI 41.52; weight unchanged from 02/20/2018.  Knows she needs to exercise regularly and apply small portions to help her weight comes down  HLP; on low fat diet mostly; zetia due to statin intolerance. Pitavastatin not tolerated either.  On cholestyramine and fish oil/Omega 3.  Also on Citrucel.  CAD: no chest pain, SOB, or palp.  Has apparently been stable  HTN: BP avr at home 130/75; watches her salt intake. On ramipril.  Impaired glucose tolerance; carbs are watched; following HgA1C; H neg for DM.  02/12/2018 hemoglobin A1c 5.1.  Exercise regularly with continued attempts at weight reduction.  Hx of PSVT; no palpitation complaints. Dr Rachel her cardiologist.  On metoprolol.  Microscopic hematuria; no dysuria; u/o stable; no gross hematuria. No frequency.   Total time 11:50 a.m. to 12:20.  Greater than 50% of the time spent in discussion, counseling, and review    Review of Systems   Constitutional: Negative for appetite change, fever and unexpected weight change.   HENT: Negative for congestion, postnasal drip, rhinorrhea and sinus pressure.    Eyes: Negative for discharge and itching.   Respiratory: Negative for cough, chest tightness, shortness of breath and wheezing.    Cardiovascular: Negative for chest pain, palpitations and leg swelling.   Gastrointestinal: Negative for abdominal distention, abdominal pain, blood in stool, constipation, diarrhea, nausea and vomiting.   Endocrine: Negative for polydipsia, polyphagia and polyuria.   Genitourinary: Negative for dysuria and hematuria.   Musculoskeletal: Negative for arthralgias and myalgias.   Skin: Negative for rash.   Allergic/Immunologic: Negative for environmental allergies and food allergies.   Neurological: Negative for tremors, seizures and syncope.   Hematological:  "Negative for adenopathy. Does not bruise/bleed easily.   Psychiatric/Behavioral:        Denies anxiety or depression       Objective:      Vitals:    07/12/18 1112   BP: 132/72   Pulse: 72   Temp: 97.8 °F (36.6 °C)   Weight: 113.2 kg (249 lb 8 oz)   Height: 5' 5" (1.651 m)     Body mass index is 41.52 kg/m².    Physical Exam   Constitutional: She is oriented to person, place, and time. She appears well-developed and well-nourished.   HENT:   Head: Normocephalic and atraumatic.   Eyes: EOM are normal.   Neck: Normal range of motion. Neck supple. No thyromegaly present.   Cardiovascular: Normal rate and regular rhythm.  Exam reveals no gallop.    No murmur heard.  HOMER 2-3/6 aortic area.   Pulmonary/Chest: Effort normal and breath sounds normal. No respiratory distress. She has no wheezes. She has no rales.   Abdominal: Soft. Bowel sounds are normal. She exhibits no distension. There is no tenderness. There is no rebound and no guarding.   Musculoskeletal: Normal range of motion. She exhibits edema.   Obese LE's; 3+ edema w spider veins noted. No calf tenderness to palp.   Lymphadenopathy:     She has no cervical adenopathy.   Neurological: She is alert and oriented to person, place, and time.   Moves all 4 extremities fine.   Skin: No rash noted.   Psychiatric: She has a normal mood and affect. Her behavior is normal. Thought content normal.   Vitals reviewed.      Assessment:       1. Benign essential HTN    2. Pure hypercholesterolemia    3. Statin intolerance    4. Coronary arteriosclerosis    5. Morbid obesity    6. Impaired glucose tolerance    7. History of PSVT (paroxysmal supraventricular tachycardia)    8. Microscopic hematuria        Plan:       Benign essential HTN. Maintain < 2 Gm Na a day diet, and monitor BP at home; keep a log. Same management.  On metoprolol and ramipril.    Pure hypercholesterolemia. Maintain low fat high fiber diet, exercise regularly. Cont krill but increase to 1000 mg 2x a day;     "   increase cholestyramine to 4 gm 2x a day. On zetia, and citrucel.    Statin intolerance; cont zetia.    Coronary arteriosclerosis; has been stable; sees Dr Rachel as her cardiologist.    Morbid obesity. Caloric restriction w regular exercise and weight reduction.    Impaired glucose tolerance. Exercise recommended with weight reduction and low carb diet; we'll follow hemoglobin A1c's with you periodically.    History of PSVT (paroxysmal supraventricular tachycardia); limit caffeine. On metoprolol.    Microscopic hematuria; repeat UA dip w micro.  No gross hematuria or symptoms of UTI.

## 2018-07-23 ENCOUNTER — TELEPHONE (OUTPATIENT)
Dept: FAMILY MEDICINE | Facility: CLINIC | Age: 73
End: 2018-07-23

## 2018-07-23 NOTE — TELEPHONE ENCOUNTER
----- Message from Luzomaira Case sent at 7/20/2018  4:19 PM CDT -----  Contact: Cora/tao [facility]  Cora is requesting a call back from nurse to speak with her regarding a fax that was sent over  for a STAT intolerance on pt. Cora would like to know if a lower dosage was offered to pt  improve intolerance or did pt try a different type of stat. Please call to advise  Call back 1 [phone]  Thanks    [facility] - [place], [place] - [address]  [address] [address]  Phone: [phone] Fax: [phone]

## 2018-07-23 NOTE — TELEPHONE ENCOUNTER
I don't recommend any statin at this time   If their is concern, then I recommend this bs discussed with Dr Garcia her PCP (Routing comment)

## 2018-07-23 NOTE — TELEPHONE ENCOUNTER
Spoke to Marsha with [facility] and relayed message below from LEIGH ANN Marroquin. Understanding verbalized.

## 2018-10-01 ENCOUNTER — LAB VISIT (OUTPATIENT)
Dept: LAB | Facility: HOSPITAL | Age: 73
End: 2018-10-01
Attending: INTERNAL MEDICINE
Payer: MEDICARE

## 2018-10-01 DIAGNOSIS — E78.00 PURE HYPERCHOLESTEROLEMIA: ICD-10-CM

## 2018-10-01 LAB
ALBUMIN SERPL BCP-MCNC: 3.6 G/DL
ALP SERPL-CCNC: 85 U/L
ALT SERPL W/O P-5'-P-CCNC: 11 U/L
AST SERPL-CCNC: 16 U/L
BILIRUB DIRECT SERPL-MCNC: 0.3 MG/DL
BILIRUB SERPL-MCNC: 0.6 MG/DL
CHOLEST SERPL-MCNC: 229 MG/DL
CHOLEST/HDLC SERPL: 4.6 {RATIO}
HDLC SERPL-MCNC: 50 MG/DL
HDLC SERPL: 21.8 %
LDLC SERPL CALC-MCNC: 158.2 MG/DL
NONHDLC SERPL-MCNC: 179 MG/DL
PROT SERPL-MCNC: 7.2 G/DL
TRIGL SERPL-MCNC: 104 MG/DL

## 2018-10-01 PROCEDURE — 80061 LIPID PANEL: CPT

## 2018-10-01 PROCEDURE — 36415 COLL VENOUS BLD VENIPUNCTURE: CPT | Mod: PO

## 2018-10-01 PROCEDURE — 80076 HEPATIC FUNCTION PANEL: CPT

## 2018-10-08 RX ORDER — METOPROLOL SUCCINATE 50 MG/1
TABLET, EXTENDED RELEASE ORAL
Qty: 90 TABLET | Refills: 1 | Status: SHIPPED | OUTPATIENT
Start: 2018-10-08 | End: 2019-07-26 | Stop reason: SDUPTHER

## 2018-10-15 ENCOUNTER — OFFICE VISIT (OUTPATIENT)
Dept: FAMILY MEDICINE | Facility: CLINIC | Age: 73
End: 2018-10-15
Payer: MEDICARE

## 2018-10-15 ENCOUNTER — LAB VISIT (OUTPATIENT)
Dept: LAB | Facility: HOSPITAL | Age: 73
End: 2018-10-15
Attending: INTERNAL MEDICINE
Payer: MEDICARE

## 2018-10-15 VITALS
BODY MASS INDEX: 42.35 KG/M2 | WEIGHT: 254.19 LBS | TEMPERATURE: 98 F | HEIGHT: 65 IN | HEART RATE: 68 BPM | DIASTOLIC BLOOD PRESSURE: 80 MMHG | SYSTOLIC BLOOD PRESSURE: 150 MMHG

## 2018-10-15 DIAGNOSIS — Z78.9 STATIN INTOLERANCE: ICD-10-CM

## 2018-10-15 DIAGNOSIS — R73.02 IMPAIRED GLUCOSE TOLERANCE: ICD-10-CM

## 2018-10-15 DIAGNOSIS — I10 UNCONTROLLED HYPERTENSION: Primary | ICD-10-CM

## 2018-10-15 DIAGNOSIS — R60.0 BILATERAL LEG EDEMA: ICD-10-CM

## 2018-10-15 DIAGNOSIS — N30.90 CYSTITIS: ICD-10-CM

## 2018-10-15 DIAGNOSIS — E78.00 PURE HYPERCHOLESTEROLEMIA: ICD-10-CM

## 2018-10-15 DIAGNOSIS — I25.10 CORONARY ARTERIOSCLEROSIS: ICD-10-CM

## 2018-10-15 DIAGNOSIS — E66.01 MORBID OBESITY: ICD-10-CM

## 2018-10-15 DIAGNOSIS — I47.10 PSVT (PAROXYSMAL SUPRAVENTRICULAR TACHYCARDIA): ICD-10-CM

## 2018-10-15 PROCEDURE — 1101F PT FALLS ASSESS-DOCD LE1/YR: CPT | Mod: CPTII,,, | Performed by: INTERNAL MEDICINE

## 2018-10-15 PROCEDURE — 3079F DIAST BP 80-89 MM HG: CPT | Mod: CPTII,,, | Performed by: INTERNAL MEDICINE

## 2018-10-15 PROCEDURE — 99999 PR PBB SHADOW E&M-EST. PATIENT-LVL III: CPT | Mod: PBBFAC,,, | Performed by: INTERNAL MEDICINE

## 2018-10-15 PROCEDURE — 87086 URINE CULTURE/COLONY COUNT: CPT

## 2018-10-15 PROCEDURE — 99213 OFFICE O/P EST LOW 20 MIN: CPT | Mod: PBBFAC,PN | Performed by: INTERNAL MEDICINE

## 2018-10-15 PROCEDURE — 3077F SYST BP >= 140 MM HG: CPT | Mod: CPTII,,, | Performed by: INTERNAL MEDICINE

## 2018-10-15 PROCEDURE — 99214 OFFICE O/P EST MOD 30 MIN: CPT | Mod: S$PBB,,, | Performed by: INTERNAL MEDICINE

## 2018-10-15 RX ORDER — CEFUROXIME AXETIL 500 MG/1
500 TABLET ORAL EVERY 12 HOURS
Qty: 10 TABLET | Refills: 0 | Status: SHIPPED | OUTPATIENT
Start: 2018-10-15 | End: 2018-10-19

## 2018-10-15 RX ORDER — RAMIPRIL 10 MG/1
10 CAPSULE ORAL DAILY
COMMUNITY
Start: 2018-08-30 | End: 2019-06-10

## 2018-10-15 NOTE — PROGRESS NOTES
Subjective:       Patient ID: Elicia Cavazos is a 73 y.o. female.    Chief Complaint: Follow-up    HPI  overall patient doing well; here for reassessment and go over labs.  Hypertension:  Watches her salt content; manually in the office today 150/80.  /75 avr; takes her meds regularly. Ramipril increased by cardiology from 5 to 10 mg but hasn't started yet. Needed.  Pure hypercholesterolemia:  On low-fat high-fiber diet; on cholestyramine, zetia and fish oil.  Statin intolerance.  Coronary arterial sclerosis:  No complaints of chest pain, shortness of breath, or palpitations. Card is Dr Rachel.  Impaired glucose tolerance:  Watches her carbohydrates. Exercises 3x a week w walking 15-20 min; goal 4-5x a week for 30 min.  PSVT:  No complaints of chest pain, shortness of breath, or palpitations. Limits her caffeine. ! Cup coffee in am.  Obesity:  Body mass index 42.3; has lost essentially 5 lbs since last visit.  Microscopic hematuria; has cleared w obtaining a CCMS specimen; gives 2 mos history on 2-3 x a night going to bathroom. No foul odor to her urine. No fever or chills; no abd pain or flank pain. UA w leukocytes on dipstix    Review of Systems   Constitutional: Negative for appetite change, fever and unexpected weight change.   HENT: Negative for congestion, postnasal drip, rhinorrhea and sinus pressure.    Eyes: Negative for discharge and itching.   Respiratory: Negative for cough, chest tightness, shortness of breath and wheezing.    Cardiovascular: Negative for chest pain, palpitations and leg swelling.   Gastrointestinal: Negative for abdominal distention, abdominal pain, blood in stool, constipation, diarrhea, nausea and vomiting.   Endocrine: Negative for polydipsia, polyphagia and polyuria.   Genitourinary: Negative for difficulty urinating, dysuria, flank pain, frequency, hematuria and urgency.        Nocturia 2-3x a night.    Musculoskeletal: Negative for arthralgias and myalgias.   Skin: Negative for  "rash.   Allergic/Immunologic: Negative for environmental allergies and food allergies.   Neurological: Negative for tremors, seizures and syncope.   Hematological: Negative for adenopathy. Does not bruise/bleed easily.   Psychiatric/Behavioral:        Denies anxiety or depression       Objective:      Vitals:    10/15/18 0847   BP: (!) 150/80   Pulse: 68   Temp: 98.3 °F (36.8 °C)   Weight: 115.3 kg (254 lb 3.1 oz)   Height: 5' 5" (1.651 m)     Body mass index is 42.3 kg/m².    Physical Exam   Constitutional: She is oriented to person, place, and time. She appears well-developed and well-nourished.   HENT:   Head: Normocephalic and atraumatic.   Eyes: EOM are normal.   Neck: Normal range of motion. Neck supple. No thyromegaly present.   Cardiovascular: Normal rate and regular rhythm. Exam reveals no gallop.   No murmur heard.  HOMER 3/6 aortic   Pulmonary/Chest: Effort normal and breath sounds normal. No respiratory distress. She has no wheezes. She has no rales.   Abdominal: Soft. Bowel sounds are normal. She exhibits no distension. There is no tenderness. There is no rebound and no guarding.   Musculoskeletal: Normal range of motion. She exhibits edema.   Obese LE's w 3+ edema and spider veins; no calf tenderness to palp.   Lymphadenopathy:     She has no cervical adenopathy.   Neurological: She is alert and oriented to person, place, and time.   Moves all 4 extremities fine.   Skin: No rash noted.   Psychiatric: She has a normal mood and affect. Her behavior is normal. Thought content normal.   Vitals reviewed.      Assessment:       1. Uncontrolled hypertension    2. Pure hypercholesterolemia    3. Statin intolerance    4. Coronary arteriosclerosis    5. PSVT (paroxysmal supraventricular tachycardia)    6. Impaired glucose tolerance    7. Morbid obesity    8. Bilateral leg edema    9. Cystitis        Plan:       Uncontrolled hypertension; increase ramipril to 10 mg a day. Watch her salt intake closer. Exercise w " weight reduction.  -     Comprehensive metabolic panel; Future; Expected date: 10/15/2018  -     Magnesium; Future; Expected date: 10/15/2018    Pure hypercholesterolemia. Maintain low fat high fiber diet, exercise regularly. Increase cholestyramine to 2x a day.   -     Lipid panel; Future; Expected date: 10/15/2018    Statin intolerance    Coronary arteriosclerosis; has been stable; sees Dr Rachel.    PSVT (paroxysmal supraventricular tachycardia); limit caffeine; has been stable.  -     Comprehensive metabolic panel; Future; Expected date: 10/15/2018  -     Magnesium; Future; Expected date: 10/15/2018    Impaired glucose tolerance. Exercise recommended with weight reduction and low carb diet; we'll follow hemoglobin A1c's with you periodically.  -     Comprehensive metabolic panel; Future; Expected date: 10/15/2018  -     Hemoglobin A1c; Future; Expected date: 10/15/2018    Morbid obesity. Caloric restriction w regular exercise and weight reduction.  -     Hemoglobin A1c; Future; Expected date: 10/15/2018    Cystitis; push fluids next few days at least.  -     cefUROXime (CEFTIN) 500 MG tablet; Take 1 tablet (500 mg total) by mouth every 12 (twelve) hours. for 5 days  Dispense: 10 tablet; Refill: 0  -     Urine culture; Future; Expected date: 10/15/2018    Buddy LE edema; Maintain less than 2 g sodium diet; elevate lower extremities at home; use compression stockings if possible. Medium strength 16-18 mmHg to below the knee; zipper style if she can find them.

## 2018-10-15 NOTE — PATIENT INSTRUCTIONS
Uncontrolled hypertension; increase ramipril to 10 mg a day. Watch her salt intake closer. Exercise w weight reduction.  -     Comprehensive metabolic panel; Future; Expected date: 10/15/2018  -     Magnesium; Future; Expected date: 10/15/2018    Pure hypercholesterolemia. Maintain low fat high fiber diet, exercise regularly. Increase cholestyramine to 2x a day.   -     Lipid panel; Future; Expected date: 10/15/2018    Statin intolerance    Coronary arteriosclerosis; has been stable; sees Dr Rachel.    PSVT (paroxysmal supraventricular tachycardia); limit caffeine; has been stable.  -     Comprehensive metabolic panel; Future; Expected date: 10/15/2018  -     Magnesium; Future; Expected date: 10/15/2018    Impaired glucose tolerance. Exercise recommended with weight reduction and low carb diet; we'll follow hemoglobin A1c's with you periodically.  -     Comprehensive metabolic panel; Future; Expected date: 10/15/2018  -     Hemoglobin A1c; Future; Expected date: 10/15/2018    Morbid obesity. Caloric restriction w regular exercise and weight reduction.  -     Hemoglobin A1c; Future; Expected date: 10/15/2018    Cystitis; push fluids next few days at least.  -     cefUROXime (CEFTIN) 500 MG tablet; Take 1 tablet (500 mg total) by mouth every 12 (twelve) hours. for 5 days  Dispense: 10 tablet; Refill: 0  -     Urine culture; Future; Expected date: 10/15/2018    Buddy LE edema; Maintain less than 2 g sodium diet; elevate lower extremities at home; use compression stockings if possible. Medium strength 16-18 mmHg to below the knee; zipper style if she can find them.

## 2018-10-17 LAB
BACTERIA UR CULT: NORMAL
BACTERIA UR CULT: NORMAL

## 2018-10-18 ENCOUNTER — NURSE TRIAGE (OUTPATIENT)
Dept: ADMINISTRATIVE | Facility: CLINIC | Age: 73
End: 2018-10-18

## 2018-10-18 ENCOUNTER — TELEPHONE (OUTPATIENT)
Dept: FAMILY MEDICINE | Facility: CLINIC | Age: 73
End: 2018-10-18

## 2018-10-18 DIAGNOSIS — N30.90 CYSTITIS: Primary | ICD-10-CM

## 2018-10-18 NOTE — TELEPHONE ENCOUNTER
Patient called to report the following:     -patient is concerned about side effects of Ceftin  -back is starting to hurt on both side, flank area, and goes towards the front, back pain is 6/10   -patient states she has been drinking a lot of water , urinating ok  -no BM in 2 days   -has stopped Ceftin  -denies numbness tingling or fever,        Education completed per [facility] On [facility] Advice including home care for pain and follow up with provider within 24 hours. Patient verbalized understating. Appointment scheduled with provider.         Reason for Disposition   MODERATE pain (e.g., interferes with normal activities or awakens from sleep)    Protocols used: ST FLANK PAIN-A-AH

## 2018-10-19 ENCOUNTER — OFFICE VISIT (OUTPATIENT)
Dept: FAMILY MEDICINE | Facility: CLINIC | Age: 73
End: 2018-10-19
Payer: MEDICARE

## 2018-10-19 ENCOUNTER — LAB VISIT (OUTPATIENT)
Dept: LAB | Facility: HOSPITAL | Age: 73
End: 2018-10-19
Attending: INTERNAL MEDICINE
Payer: MEDICARE

## 2018-10-19 VITALS
TEMPERATURE: 98 F | WEIGHT: 254 LBS | SYSTOLIC BLOOD PRESSURE: 122 MMHG | HEART RATE: 60 BPM | BODY MASS INDEX: 42.32 KG/M2 | DIASTOLIC BLOOD PRESSURE: 70 MMHG | HEIGHT: 65 IN

## 2018-10-19 DIAGNOSIS — R10.9 BILATERAL FLANK PAIN: ICD-10-CM

## 2018-10-19 DIAGNOSIS — M54.42 ACUTE BILATERAL LOW BACK PAIN WITH LEFT-SIDED SCIATICA: ICD-10-CM

## 2018-10-19 DIAGNOSIS — I10 BENIGN ESSENTIAL HTN: ICD-10-CM

## 2018-10-19 DIAGNOSIS — N10 ACUTE PYELONEPHRITIS: Primary | ICD-10-CM

## 2018-10-19 DIAGNOSIS — N10 ACUTE PYELONEPHRITIS: ICD-10-CM

## 2018-10-19 DIAGNOSIS — E66.01 MORBID OBESITY: ICD-10-CM

## 2018-10-19 LAB
BILIRUB UR QL STRIP: NEGATIVE
CLARITY UR REFRACT.AUTO: CLEAR
COLOR UR AUTO: YELLOW
GLUCOSE UR QL STRIP: NEGATIVE
HGB UR QL STRIP: NEGATIVE
KETONES UR QL STRIP: NEGATIVE
LEUKOCYTE ESTERASE UR QL STRIP: ABNORMAL
MICROSCOPIC COMMENT: NORMAL
NITRITE UR QL STRIP: NEGATIVE
PH UR STRIP: 6 [PH] (ref 5–8)
PROT UR QL STRIP: NEGATIVE
RBC #/AREA URNS AUTO: 4 /HPF (ref 0–4)
SP GR UR STRIP: 1.01 (ref 1–1.03)
SQUAMOUS #/AREA URNS AUTO: 1 /HPF
URN SPEC COLLECT METH UR: ABNORMAL
UROBILINOGEN UR STRIP-ACNC: NEGATIVE EU/DL
WBC #/AREA URNS AUTO: 0 /HPF (ref 0–5)

## 2018-10-19 PROCEDURE — 87086 URINE CULTURE/COLONY COUNT: CPT

## 2018-10-19 PROCEDURE — 99213 OFFICE O/P EST LOW 20 MIN: CPT | Mod: PBBFAC,PN | Performed by: INTERNAL MEDICINE

## 2018-10-19 PROCEDURE — 81001 URINALYSIS AUTO W/SCOPE: CPT

## 2018-10-19 PROCEDURE — 3078F DIAST BP <80 MM HG: CPT | Mod: CPTII,,, | Performed by: INTERNAL MEDICINE

## 2018-10-19 PROCEDURE — 99214 OFFICE O/P EST MOD 30 MIN: CPT | Mod: S$PBB,,, | Performed by: INTERNAL MEDICINE

## 2018-10-19 PROCEDURE — 99999 PR PBB SHADOW E&M-EST. PATIENT-LVL III: CPT | Mod: PBBFAC,,, | Performed by: INTERNAL MEDICINE

## 2018-10-19 PROCEDURE — 1101F PT FALLS ASSESS-DOCD LE1/YR: CPT | Mod: CPTII,,, | Performed by: INTERNAL MEDICINE

## 2018-10-19 PROCEDURE — 3074F SYST BP LT 130 MM HG: CPT | Mod: CPTII,,, | Performed by: INTERNAL MEDICINE

## 2018-10-19 RX ORDER — CIPROFLOXACIN 500 MG/1
500 TABLET ORAL EVERY 12 HOURS
Qty: 14 TABLET | Refills: 0 | Status: SHIPPED | OUTPATIENT
Start: 2018-10-19 | End: 2018-10-26

## 2018-10-19 NOTE — PATIENT INSTRUCTIONS
cute pyelonephritis; push fluids; take Azo supplements next few days. Stop ceftin. Start cipro 500 mg every 12 hrs for 1 week.  -     Urinalysis; Future; Expected date: 10/19/2018  -     Urinalysis Microscopic; Future; Expected date: 10/19/2018  -     Urine culture; Future; Expected date: 10/19/2018  -     ciprofloxacin HCl (CIPRO) 500 MG tablet; Take 1 tablet (500 mg total) by mouth every 12 (twelve) hours. for 7 days  Dispense: 14 tablet; Refill: 0    Bilateral flank pain; tylenol arthritis for pain.    Acute bilateral low back pain with left-sided sciatica; tylenol arthritis for pain. Cold applications to lower back.  -     ciprofloxacin HCl (CIPRO) 500 MG tablet; Take 1 tablet (500 mg total) by mouth every 12 (twelve) hours. for 7 days  Dispense: 14 tablet; Refill: 0    Benign essential HTN. Maintain < 2 Gm Na a day diet, and monitor BP at home; keep a log. Better w increasing ramipril.    Morbid obesity. Caloric restriction w regular exercise and weight reduction.

## 2018-10-19 NOTE — PROGRESS NOTES
Subjective:       Patient ID: Elicia Cavazos is a 73 y.o. female.    Chief Complaint: Flank Pain (lower back that radiates around to the abdomen and down into her vagina since yesterday)    HPI  Patient recently seen in the office 10/15/2018 and was started on Ceftin at that time 500 mg every 12 hr for 5 days for cystitis.  At that time she gave a history of 2 months of nocturia 2-3 times per night but with no fever or foul odor to her urine.  No abdominal pain or flank pain noted at that time; the prior UA showed 1+ leukocytes on dipstick and few bacteria with 8 WBCs on microscopic.  The subsequent urine culture from 10/15/2018 grew out multiple organisms and was suspected to be contaminated as none were predominant organisms;  requested repeat urine culture be done yesterday.  LBP started yesterday in morning, w pressure sensation which radiates from her lower back to suprapubic areas and lower abdomen. No foul smell to urine. No fever or chills. No gross hematuria. But jony flank pain. Since yesterday early morning. No urgency or frequency; nocturia still 2-3x a night.    Review of Systems   Constitutional: Negative for appetite change, fever and unexpected weight change.   HENT: Negative for congestion, postnasal drip, rhinorrhea and sinus pressure.    Eyes: Negative for discharge and itching.   Respiratory: Negative for cough, chest tightness, shortness of breath and wheezing.    Cardiovascular: Negative for chest pain, palpitations and leg swelling.   Gastrointestinal: Negative for abdominal distention, abdominal pain, blood in stool, constipation, diarrhea, nausea and vomiting.   Endocrine: Negative for polydipsia, polyphagia and polyuria.   Genitourinary: Positive for flank pain. Negative for dysuria, frequency, hematuria and urgency.   Musculoskeletal: Negative for arthralgias and myalgias.   Skin: Negative for rash.   Allergic/Immunologic: Negative for environmental allergies and food allergies.   Neurological:  "Negative for tremors, seizures and syncope.   Hematological: Negative for adenopathy. Does not bruise/bleed easily.   Psychiatric/Behavioral:        Denies anxiety or depression.       Objective:      Vitals:    10/19/18 1321   BP: 122/70   Pulse: 60   Temp: 98.2 °F (36.8 °C)   Weight: 115.2 kg (254 lb)   Height: 5' 5" (1.651 m)     Body mass index is 42.27 kg/m².    Physical Exam   Constitutional: She is oriented to person, place, and time. She appears well-developed and well-nourished.   HENT:   Head: Normocephalic and atraumatic.   Eyes: EOM are normal.   Neck: Normal range of motion. Neck supple. No thyromegaly present.   Cardiovascular: Normal rate and regular rhythm. Exam reveals no gallop.   No murmur heard.  HOMER: 3-4/6 aortic w 3/6 pulm.   Pulmonary/Chest: Effort normal and breath sounds normal. No respiratory distress. She has no wheezes. She has no rales.   Abdominal: Soft. Bowel sounds are normal. She exhibits no distension. There is no tenderness. There is no rebound and no guarding.   No post CVA tenderness to palp; no flank tenderness to palp. Soft   Musculoskeletal: Normal range of motion. She exhibits no edema.   No L-S sp tenderness to palp. SLR neg jony; MS 4.5/5 LE's; nl ROM hips and nontender.   Lymphadenopathy:     She has no cervical adenopathy.   Neurological: She is alert and oriented to person, place, and time.   Moves all 4 extremities fine.   Skin: No rash noted.   Psychiatric: She has a normal mood and affect. Her behavior is normal. Thought content normal.   Vitals reviewed.      Assessment:       1. Acute pyelonephritis    2. Bilateral flank pain    3. Acute bilateral low back pain with left-sided sciatica    4. Benign essential HTN    5. Morbid obesity        Plan:       Acute pyelonephritis; push fluids; take Azo supplements next few days. Stop ceftin. Start cipro 500 mg every 12 hrs for 1 week.  -     Urinalysis; Future; Expected date: 10/19/2018  -     Urinalysis Microscopic; Future; " Expected date: 10/19/2018  -     Urine culture; Future; Expected date: 10/19/2018  -     ciprofloxacin HCl (CIPRO) 500 MG tablet; Take 1 tablet (500 mg total) by mouth every 12 (twelve) hours. for 7 days  Dispense: 14 tablet; Refill: 0    Bilateral flank pain; tylenol arthritis for pain.    Acute bilateral low back pain with left-sided sciatica; tylenol arthritis for pain. Cold applications to lower back.  -     ciprofloxacin HCl (CIPRO) 500 MG tablet; Take 1 tablet (500 mg total) by mouth every 12 (twelve) hours. for 7 days  Dispense: 14 tablet; Refill: 0    Benign essential HTN. Maintain < 2 Gm Na a day diet, and monitor BP at home; keep a log. Better w increasing ramipril.    Morbid obesity. Caloric restriction w regular exercise and weight reduction.

## 2018-10-19 NOTE — TELEPHONE ENCOUNTER
Patient states that she was having problems with the medication ceftin. Stopped the medication and has appt scheduled to see Dr Garcia today.

## 2018-10-20 LAB — BACTERIA UR CULT: NORMAL

## 2018-10-25 ENCOUNTER — OFFICE VISIT (OUTPATIENT)
Dept: OBSTETRICS AND GYNECOLOGY | Facility: CLINIC | Age: 73
End: 2018-10-25
Payer: MEDICARE

## 2018-10-25 VITALS — BODY MASS INDEX: 42.3 KG/M2 | WEIGHT: 254.19 LBS | SYSTOLIC BLOOD PRESSURE: 142 MMHG | DIASTOLIC BLOOD PRESSURE: 80 MMHG

## 2018-10-25 DIAGNOSIS — Z01.419 ENCOUNTER FOR GYNECOLOGICAL EXAMINATION WITHOUT ABNORMAL FINDING: Primary | ICD-10-CM

## 2018-10-25 DIAGNOSIS — B37.9 CANDIDIASIS: ICD-10-CM

## 2018-10-25 DIAGNOSIS — Z12.31 VISIT FOR SCREENING MAMMOGRAM: ICD-10-CM

## 2018-10-25 PROCEDURE — 99213 OFFICE O/P EST LOW 20 MIN: CPT | Mod: PBBFAC,PN,25 | Performed by: OBSTETRICS & GYNECOLOGY

## 2018-10-25 PROCEDURE — G0101 CA SCREEN;PELVIC/BREAST EXAM: HCPCS | Mod: PBBFAC,PN | Performed by: OBSTETRICS & GYNECOLOGY

## 2018-10-25 PROCEDURE — 99999 PR PBB SHADOW E&M-EST. PATIENT-LVL III: CPT | Mod: PBBFAC,,, | Performed by: OBSTETRICS & GYNECOLOGY

## 2018-10-25 PROCEDURE — G0101 CA SCREEN;PELVIC/BREAST EXAM: HCPCS | Mod: S$PBB,,, | Performed by: OBSTETRICS & GYNECOLOGY

## 2018-10-25 RX ORDER — FLUCONAZOLE 150 MG/1
150 TABLET ORAL
Qty: 3 TABLET | Refills: 0 | Status: SHIPPED | OUTPATIENT
Start: 2018-10-25 | End: 2018-11-13 | Stop reason: ALTCHOICE

## 2018-10-25 NOTE — PROGRESS NOTES
Chief Complaint   Patient presents with    Well Woman    vaginal irritation     History of Present Illness: Elicia Cavazos is a 73 y.o. female that presents today 10/25/2018 for well gyn visit.    Past Medical History:   Diagnosis Date    Carotid artery stenosis     Coronary artery disease     Hyperlipidemia     Hypertension     Obesity     Paroxysmal supraventricular tachycardia        Past Surgical History:   Procedure Laterality Date    APPENDECTOMY      CARPAL TUNNEL RELEASE      CATARACT EXTRACTION BILATERAL W/ ANTERIOR VITRECTOMY      COLONOSCOPY N/A 12/18/2015    Procedure: COLONOSCOPY;  Surgeon: Timothy Syed Jr., MD;  Location: [facility];  Service: Endoscopy;  Laterality: N/A;repeatb in 3 yrs. Benign polyps w possible adenomatous change in transverse colon polyp..    COLONOSCOPY N/A 12/18/2015    Performed by Timothy Syed Jr., MD at [facility]    EYE SURGERY      HAND SURGERY  01/01/2001    HYSTERECTOMY  1979    without BSO.    toenail extraction Right 2016    4th toe.       Current Outpatient Medications   Medication Sig Dispense Refill    acetaminophen (TYLENOL EXTRA STRENGTH) 500 MG tablet Take 500 mg by mouth 3 (three) times daily as needed for Pain.      ascorbic acid, vitamin C, (VITAMIN C) 500 MG tablet Take 500 mg by mouth once daily.      aspirin (ECOTRIN) 81 MG EC tablet Take 81 mg by mouth once daily.        calcium-vitamin D 500-125 mg-unit tablet Take 1 tablet by mouth once daily.        cetirizine (ZYRTEC) 10 MG tablet Take 10 mg by mouth once daily.      cholestyramine, with sugar, 4 gram Powd 4 g or 1 scoop to be mixed in 8 oz water daily; if tolerated after 1 week increase to 2x a day. 378 g 2    ciprofloxacin HCl (CIPRO) 500 MG tablet Take 1 tablet (500 mg total) by mouth every 12 (twelve) hours. for 7 days 14 tablet 0    fish oil-omega-3 fatty acids 300-1,000 mg capsule Take 1,000 mg by mouth once daily.       GLUCOSAMINE HCL/CHONDR VILLASENOR A NA (OSTEO BI-FLEX ORAL) Take  by mouth once daily.      hydrocodone-acetaminophen 7.5-325mg (NORCO) 7.5-325 mg per tablet Take 1 tablet by mouth every 6 (six) hours as needed for Pain.      methylcellulose, laxative, (CITRUCEL) 500 mg Tab Take by mouth once daily.      metoprolol succinate (TOPROL-XL) 50 MG 24 hr tablet TAKE 1 TABLET EVERY DAY 90 tablet 1    multivitamin (MULTIVITAMIN) per tablet Take 1 tablet by mouth once daily.        nutritional supplement-fiber Liqd Take by mouth.      nystatin (MYCOSTATIN) cream Apply topically 2 (two) times daily. 30 g 2    ramipril (ALTACE) 10 MG capsule Take 10 mg by mouth once daily.      terconazole (TERAZOL 7) 0.4 % Crea Place 1 applicator vaginally every evening. 45 g 4    triamcinolone (NASACORT) 55 mcg nasal inhaler 2 sprays by Nasal route once daily.      UBIDECARENONE (COENZYME Q10) 100 mg Tab Take 100 mg by mouth once daily.       vitamin D 185 MG Tab Take 500 mg by mouth once daily.       vitamin E 400 UNIT capsule Take 400 Units by mouth once daily.      VITAMIN E/FLAXSEED OIL (FLAXSEED OIL-VITAMIN E) 1,000-1 mg-unit Cap Take 1,000 mg by mouth once daily.        ezetimibe (ZETIA) 10 mg tablet Take 1 tablet (10 mg total) by mouth once daily. 30 tablet 2    fluconazole (DIFLUCAN) 150 MG Tab Take 1 tablet (150 mg total) by mouth Every 3 (three) days. 3 tablet 0     No current facility-administered medications for this visit.        Review of patient's allergies indicates:   Allergen Reactions    Crestor [rosuvastatin] Other (See Comments)     Muscle ache    Lipitor [atorvastatin] Other (See Comments)     Muscle ache    Pravachol [pravastatin] Other (See Comments)     Muscle ache    Sulfa (sulfonamide antibiotics) Swelling    Welchol [colesevelam] Other (See Comments)     Muscle ache    Zocor [simvastatin] Other (See Comments)     Muscle ache    Influenza virus vaccines Swelling and Rash     2014 flu vaccination       Family History   Problem Relation Age of Onset    Cancer  Mother     Cancer Father     COPD Son     Ovarian cancer Neg Hx     Breast cancer Neg Hx        Social History     Socioeconomic History    Marital status:      Spouse name: None    Number of children: None    Years of education: None    Highest education level: None   Social Needs    Financial resource strain: None    Food insecurity - worry: None    Food insecurity - inability: None    Transportation needs - medical: None    Transportation needs - non-medical: None   Occupational History    Occupation: retired .   Tobacco Use    Smoking status: Never Smoker    Smokeless tobacco: Never Used   Substance and Sexual Activity    Alcohol use: No     Comment: rarely.    Drug use: No    Sexual activity: No   Other Topics Concern    None   Social History Narrative    None       OB History    Para Term  AB Living   4 4 4         SAB TAB Ectopic Multiple Live Births                  # Outcome Date GA Lbr Yariel/2nd Weight Sex Delivery Anes PTL Lv   4 Term            3 Term            2 Term            1 Term                   Review of Symptoms:  GENERAL: Denies weight gain or weight loss. Feeling well overall.   SKIN: Denies rash or lesions.   HEAD: Denies head injury or headache.   NODES: Denies enlarged lymph nodes.   CHEST: Denies chest pain or shortness of breath.   CARDIOVASCULAR: Denies palpitations or left sided chest pain.   ABDOMEN: No abdominal pain, constipation, diarrhea, nausea, vomiting or rectal bleeding.   URINARY: No frequency, dysuria, hematuria, or burning on urination.  HEMATOLOGIC: No easy bruisability or excessive bleeding.   MUSCULOSKELETAL: Denies joint pain or swelling.     BP (!) 142/80   Wt 115.3 kg (254 lb 3.1 oz)   Physical Exam:  APPEARANCE: Well nourished, well developed, in no acute distress.  SKIN: Normal skin turgor, no lesions.  NECK: Neck symmetric without masses   RESPIRATORY: Normal respiratory effort with no retractions or use of accessory  muscles  CARDIOVASCULAR: Peripheral vascular system with no swelling no varicosities and palpation of pulses normal  LYMPHATIC: No enlargements of the lymph nodes noted in the neck, axillae, or groin  ABDOMEN: Soft. No tenderness or masses. No hepatosplenomegaly. No hernias.  BREASTS: Symmetrical, no skin changes or visible lesions. No palpable masses, nipple discharge or adenopathy bilaterally.  PELVIC: Normal external female genitalia with erythema on the gluteal fold +++ Normal hair distribution. Adequate perineal body, normal urethral meatus. Urethra with no masses.  Bladder nontender. Vagina moist and well rugated without lesions or discharge. No significant cystocele or rectocele.  Adnexa without masses or tenderness. Urethra and bladder normal.   EXTREMITIES: No clubbing cyanosis or edema.    ASSESSMENT/PLAN:  Encounter for gynecological examination without abnormal finding    Visit for screening mammogram  -     Mammo Digital Screening Bilat With CAD; Future; Expected date: 10/25/2018    Candidiasis    Other orders  -     fluconazole (DIFLUCAN) 150 MG Tab; Take 1 tablet (150 mg total) by mouth Every 3 (three) days.  Dispense: 3 tablet; Refill: 0          Patient was counseled today on Pap guidelines, recommendation for yearly exams, mammograms every other year after the age of 40 and annually after the age of 50, Colonoscopy after the age of 50, Dexa Bone Scan and calcium and vitamin D supplementation in menopause and to see her PCP for other health maintenance.   FOLLOW-UP:prn

## 2018-11-08 ENCOUNTER — HOSPITAL ENCOUNTER (OUTPATIENT)
Dept: RADIOLOGY | Facility: HOSPITAL | Age: 73
Discharge: HOME | End: 2018-11-08
Attending: OBSTETRICS & GYNECOLOGY
Payer: MEDICARE

## 2018-11-08 DIAGNOSIS — Z12.31 VISIT FOR SCREENING MAMMOGRAM: ICD-10-CM

## 2018-11-08 PROCEDURE — 77067 SCR MAMMO BI INCL CAD: CPT | Mod: 26,,, | Performed by: RADIOLOGY

## 2018-11-08 PROCEDURE — 77067 SCR MAMMO BI INCL CAD: CPT | Mod: TC,PO

## 2018-11-08 PROCEDURE — 77063 BREAST TOMOSYNTHESIS BI: CPT | Mod: 26,,, | Performed by: RADIOLOGY

## 2018-11-08 PROCEDURE — 77063 BREAST TOMOSYNTHESIS BI: CPT | Mod: TC,PO

## 2018-11-13 ENCOUNTER — OFFICE VISIT (OUTPATIENT)
Dept: FAMILY MEDICINE | Facility: CLINIC | Age: 73
End: 2018-11-13
Payer: MEDICARE

## 2018-11-13 VITALS
OXYGEN SATURATION: 99 % | RESPIRATION RATE: 18 BRPM | DIASTOLIC BLOOD PRESSURE: 68 MMHG | BODY MASS INDEX: 42.39 KG/M2 | HEIGHT: 65 IN | SYSTOLIC BLOOD PRESSURE: 130 MMHG | WEIGHT: 254.44 LBS | TEMPERATURE: 98 F | HEART RATE: 62 BPM

## 2018-11-13 DIAGNOSIS — I10 BENIGN ESSENTIAL HTN: Primary | ICD-10-CM

## 2018-11-13 DIAGNOSIS — E66.01 MORBID OBESITY: ICD-10-CM

## 2018-11-13 DIAGNOSIS — R73.02 IMPAIRED GLUCOSE TOLERANCE: ICD-10-CM

## 2018-11-13 DIAGNOSIS — R60.0 BILATERAL LEG EDEMA: ICD-10-CM

## 2018-11-13 DIAGNOSIS — I25.10 CORONARY ARTERIOSCLEROSIS: ICD-10-CM

## 2018-11-13 DIAGNOSIS — E78.00 PURE HYPERCHOLESTEROLEMIA: ICD-10-CM

## 2018-11-13 DIAGNOSIS — Z78.9 STATIN INTOLERANCE: ICD-10-CM

## 2018-11-13 DIAGNOSIS — E55.9 VITAMIN D DEFICIENCY: ICD-10-CM

## 2018-11-13 PROCEDURE — 99999 PR PBB SHADOW E&M-EST. PATIENT-LVL V: CPT | Mod: PBBFAC,,, | Performed by: INTERNAL MEDICINE

## 2018-11-13 PROCEDURE — 3075F SYST BP GE 130 - 139MM HG: CPT | Mod: CPTII,S$GLB,, | Performed by: INTERNAL MEDICINE

## 2018-11-13 PROCEDURE — 1101F PT FALLS ASSESS-DOCD LE1/YR: CPT | Mod: CPTII,S$GLB,, | Performed by: INTERNAL MEDICINE

## 2018-11-13 PROCEDURE — 3078F DIAST BP <80 MM HG: CPT | Mod: CPTII,S$GLB,, | Performed by: INTERNAL MEDICINE

## 2018-11-13 PROCEDURE — 99214 OFFICE O/P EST MOD 30 MIN: CPT | Mod: S$GLB,,, | Performed by: INTERNAL MEDICINE

## 2018-11-13 RX ORDER — VITAMIN E 268 MG
CAPSULE ORAL
COMMUNITY
Start: 2018-11-12 | End: 2018-11-13 | Stop reason: SDUPTHER

## 2018-11-13 NOTE — PATIENT INSTRUCTIONS
Benign essential HTN. Maintain < 2 Gm Na a day diet, and monitor BP at home; keep a log. On ramipril and metoprolol.  -     Comprehensive metabolic panel; Future; Expected date: 11/13/2018  -     CBC auto differential; Future; Expected date: 11/13/2018  -     Magnesium; Future; Expected date: 11/13/2018  -     TSH; Future; Expected date: 11/13/2018  -     T4, free; Future; Expected date: 11/13/2018    Pure hypercholesterolemia. Maintain low fat high fiber diet, exercise regularly. On zetia w statin intolerance; fish oil, and cholestyramine.   -     Lipid panel; Future; Expected date: 11/13/2018  -     Comprehensive metabolic panel; Future; Expected date: 11/13/2018  -     TSH; Future; Expected date: 11/13/2018  -     T4, free; Future; Expected date: 11/13/2018    Statin intolerance    Coronary arteriosclerosis; has been stable; same management. Card is Dr Rachel.  -     Lipid panel; Future; Expected date: 11/13/2018  -     Comprehensive metabolic panel; Future; Expected date: 11/13/2018  -     CBC auto differential; Future; Expected date: 11/13/2018  -     Hemoglobin A1c; Future; Expected date: 11/13/2018  -     Brain natriuretic peptide; Future; Expected date: 11/13/2018  -     TSH; Future; Expected date: 11/13/2018  -     T4, free; Future; Expected date: 11/13/2018    Impaired glucose tolerance. Exercise recommended with weight reduction and low carb diet; we'll follow hemoglobin A1c's with you periodically.  -     Comprehensive metabolic panel; Future; Expected date: 11/13/2018  -     Hemoglobin A1c; Future; Expected date: 11/13/2018    Morbid obesity. Caloric restriction w regular exercise and weight reduction.  -     TSH; Future; Expected date: 11/13/2018  -     T4, free; Future; Expected date: 11/13/2018    Bilateral leg edema. Maintain less than 2 g sodium diet; elevate lower extremities at home; use compression stockings if possible.  -     TSH; Future; Expected date: 11/13/2018  -     T4, free; Future;  Expected date: 11/13/2018    Vitamin D deficiency  -     Vitamin D; Future; Expected date: 11/13/2018

## 2018-11-13 NOTE — PROGRESS NOTES
"Subjective:       Patient ID: Elicia Cavazos is a 73 y.o. female.    Chief Complaint: Results (blood work and mammogram )    HPI  Overall has been doing fine.  HLP: on low fat high fiber diet; on zetia and cholestyramine, and fish oil. Statin intolerance.  HTN: BP avr at home 135/68; here 130/68; watches her salt intake. On metoprolol and ramipril.  CAD: no chest pain, SOB, or palp; Card is dr Arena.   Impaired glucose tolerance; watches her carbs of note.   Obesity; BMI 42.34; exercises less w weather change.   Vit D def; 2/2018 Vit D was 55; 2/12/18 DEXA wnl; repeat in 3 yrs.  No dysuria, no frequency or change in u/o, fever or chills.   11/8/18 mammo still pending as a screen. Total time: 9829-5078; .50% time spent on discussion, counseling,   and review; labs reviewed and ordered for f/u.    Review of Systems   Constitutional: Negative for appetite change and fever.   HENT: Negative for congestion, postnasal drip, rhinorrhea and sinus pressure.    Eyes: Negative for discharge and itching.   Respiratory: Negative for cough, chest tightness, shortness of breath and wheezing.    Cardiovascular: Negative for chest pain, palpitations and leg swelling.   Gastrointestinal: Negative for abdominal distention, abdominal pain, blood in stool, constipation, diarrhea, nausea and vomiting.   Endocrine: Negative for polydipsia, polyphagia and polyuria.   Genitourinary: Negative for dysuria and hematuria.   Musculoskeletal: Negative for arthralgias and myalgias.   Skin: Negative for rash.   Allergic/Immunologic: Negative for environmental allergies and food allergies.   Neurological: Negative for tremors, seizures and syncope.   Hematological: Negative for adenopathy. Does not bruise/bleed easily.       Objective:      Vitals:    11/13/18 1107   BP: 130/68   Pulse: 62   Resp: 18   Temp: 98.1 °F (36.7 °C)   TempSrc: Oral   SpO2: 99%   Weight: 115.4 kg (254 lb 6.6 oz)   Height: 5' 5" (1.651 m)     Body mass index is 42.34 " kg/m².    Physical Exam   Constitutional: She is oriented to person, place, and time. She appears well-developed and well-nourished.   HENT:   Head: Normocephalic and atraumatic.   Eyes: EOM are normal.   Neck: Normal range of motion. Neck supple. No thyromegaly present.   Cardiovascular: Normal rate and regular rhythm. Exam reveals no gallop.   No murmur heard.  HOMER 2-3/6 aortic area.   Pulmonary/Chest: Effort normal and breath sounds normal. No respiratory distress. She has no wheezes. She has no rales.   Abdominal: Soft. Bowel sounds are normal. She exhibits no distension. There is no tenderness. There is no rebound and no guarding.   Musculoskeletal: Normal range of motion. She exhibits edema.   3+ jony LE edema w spider veins; no calf tenderness to palp; obese LE's.   Lymphadenopathy:     She has no cervical adenopathy.   Neurological: She is alert and oriented to person, place, and time.   Moves all 4 extremities fine.   Skin: No rash noted.   Psychiatric: She has a normal mood and affect. Her behavior is normal. Thought content normal.   Vitals reviewed.      Assessment:       1. Benign essential HTN    2. Pure hypercholesterolemia    3. Statin intolerance    4. Coronary arteriosclerosis    5. Impaired glucose tolerance    6. Morbid obesity    7. Bilateral leg edema    8. Vitamin D deficiency        Plan:       Benign essential HTN. Maintain < 2 Gm Na a day diet, and monitor BP at home; keep a log. On ramipril and metoprolol.  -     Comprehensive metabolic panel; Future; Expected date: 11/13/2018  -     CBC auto differential; Future; Expected date: 11/13/2018  -     Magnesium; Future; Expected date: 11/13/2018  -     TSH; Future; Expected date: 11/13/2018  -     T4, free; Future; Expected date: 11/13/2018    Pure hypercholesterolemia. Maintain low fat high fiber diet, exercise regularly. On zetia w statin intolerance; fish oil, and cholestyramine. Increase citrucel to 2x a day; adhere to her diet better.  Exercise needed.  -     Lipid panel; Future; Expected date: 11/13/2018  -     Comprehensive metabolic panel; Future; Expected date: 11/13/2018  -     TSH; Future; Expected date: 11/13/2018  -     T4, free; Future; Expected date: 11/13/2018    Statin intolerance    Coronary arteriosclerosis; has been stable; same management. Card is Dr Rachel.  -     Lipid panel; Future; Expected date: 11/13/2018  -     Comprehensive metabolic panel; Future; Expected date: 11/13/2018  -     CBC auto differential; Future; Expected date: 11/13/2018  -     Hemoglobin A1c; Future; Expected date: 11/13/2018  -     Brain natriuretic peptide; Future; Expected date: 11/13/2018  -     TSH; Future; Expected date: 11/13/2018  -     T4, free; Future; Expected date: 11/13/2018    Impaired glucose tolerance. Exercise recommended with weight reduction and low carb diet; we'll follow hemoglobin A1c's with you periodically.  -     Comprehensive metabolic panel; Future; Expected date: 11/13/2018  -     Hemoglobin A1c; Future; Expected date: 11/13/2018    Morbid obesity. Caloric restriction w regular exercise and weight reduction.  -     TSH; Future; Expected date: 11/13/2018  -     T4, free; Future; Expected date: 11/13/2018    Bilateral leg edema. Maintain less than 2 g sodium diet; elevate lower extremities at home; use compression stockings if possible.  -     TSH; Future; Expected date: 11/13/2018  -     T4, free; Future; Expected date: 11/13/2018    Vitamin D deficiency  -     Vitamin D; Future; Expected date: 11/13/2018    Addendum: please try to obtain mammogram screen results from 11/8/18 for chart.

## 2019-01-07 ENCOUNTER — TELEPHONE (OUTPATIENT)
Dept: FAMILY MEDICINE | Facility: CLINIC | Age: 74
End: 2019-01-07

## 2019-01-07 NOTE — TELEPHONE ENCOUNTER
----- Message from Tomas Kovacs sent at 1/7/2019 11:23 AM CST -----  Type: Needs Medical Advice    Who Called:  Patient  Best Call Back Number:[phone]  Additional Information: Patient is calling to see why she has 2 3 month follow up appointments for Dr. Garcia. Also, her lab work on 1.9 and 2.6.

## 2019-01-07 NOTE — TELEPHONE ENCOUNTER
Please advise as patient has 2 scheduled appt. Canceled the Jan appt but kept the Feb appt per Dr Borrego last office note.

## 2019-02-04 ENCOUNTER — TELEPHONE (OUTPATIENT)
Dept: FAMILY MEDICINE | Facility: CLINIC | Age: 74
End: 2019-02-04

## 2019-02-04 NOTE — TELEPHONE ENCOUNTER
Patient states that she has diarrhea. States that it is pure liquid. Started yesterday all day and all night. States that she cannot leave the restroom. Was using anti-diarrheal medication and not working. Advised that if viral then we like for it to run it course. States that she cannot go like this. Needs something stronger to stop it. No fever and no abd pain noted.

## 2019-02-04 NOTE — TELEPHONE ENCOUNTER
----- Message from Timothy Butler sent at 2/4/2019  8:13 AM CST -----  Contact: Patient  Advised needs to speak with nurse as soon as possible. Has severe diarrhea needs something right away.  Please call [phone]

## 2019-02-05 ENCOUNTER — TELEPHONE (OUTPATIENT)
Dept: FAMILY MEDICINE | Facility: CLINIC | Age: 74
End: 2019-02-05

## 2019-02-05 NOTE — TELEPHONE ENCOUNTER
Case discussed with patient by phone.  She has been using Kaopectate over-the-counter and  her diarrhea is been improving lately; she has had it since Saturday.  She relates no history of fever, abdominal pain, muscle aches, headaches, cough, or blood in her stool.  She has been advised to limit any caffeine.  Has been recommended to change to Imodium over the counter for diarrhea and to continue on a clear liquid diet til she starts to improving; then advance to full liquids and continue to advance as tolerated; she has been advised to move her follow-up appointment forward about 2-3 weeks with labs a few days prior; please reschedule her f/u apt 3 weeks forward.

## 2019-02-05 NOTE — TELEPHONE ENCOUNTER
----- Message from Karina Bell sent at 2/5/2019 12:08 PM CST -----  Type: Needs Medical Advice    Who Called:  Patient    Best Call Back Number: [phone] (home)     Additional Information: Patient called to reschedule labs for 2/27/19, her urine order expires on 2/18/19. Need a new order, thank you

## 2019-02-11 ENCOUNTER — OFFICE VISIT (OUTPATIENT)
Dept: FAMILY MEDICINE | Facility: CLINIC | Age: 74
End: 2019-02-11
Payer: MEDICARE

## 2019-02-11 VITALS
HEART RATE: 64 BPM | WEIGHT: 257.5 LBS | TEMPERATURE: 98 F | SYSTOLIC BLOOD PRESSURE: 124 MMHG | DIASTOLIC BLOOD PRESSURE: 80 MMHG | HEIGHT: 65 IN | BODY MASS INDEX: 42.9 KG/M2

## 2019-02-11 DIAGNOSIS — R10.9 FLANK PAIN: ICD-10-CM

## 2019-02-11 DIAGNOSIS — M54.50 ACUTE BILATERAL LOW BACK PAIN WITHOUT SCIATICA: Primary | ICD-10-CM

## 2019-02-11 LAB
BILIRUB SERPL-MCNC: NORMAL MG/DL
BLOOD URINE, POC: NORMAL
COLOR, POC UA: NORMAL
GLUCOSE UR QL STRIP: NORMAL
KETONES UR QL STRIP: NORMAL
LEUKOCYTE ESTERASE URINE, POC: NORMAL
NITRITE, POC UA: NORMAL
PH, POC UA: 5
PROTEIN, POC: NORMAL
SPECIFIC GRAVITY, POC UA: 1.03
UROBILINOGEN, POC UA: NORMAL

## 2019-02-11 PROCEDURE — 99214 PR OFFICE/OUTPT VISIT, EST, LEVL IV, 30-39 MIN: ICD-10-PCS | Mod: 25,HCNC,S$GLB, | Performed by: INTERNAL MEDICINE

## 2019-02-11 PROCEDURE — 99214 OFFICE O/P EST MOD 30 MIN: CPT | Mod: 25,HCNC,S$GLB, | Performed by: INTERNAL MEDICINE

## 2019-02-11 PROCEDURE — 3074F SYST BP LT 130 MM HG: CPT | Mod: HCNC,CPTII,S$GLB, | Performed by: INTERNAL MEDICINE

## 2019-02-11 PROCEDURE — 3079F DIAST BP 80-89 MM HG: CPT | Mod: HCNC,CPTII,S$GLB, | Performed by: INTERNAL MEDICINE

## 2019-02-11 PROCEDURE — 3079F PR MOST RECENT DIASTOLIC BLOOD PRESSURE 80-89 MM HG: ICD-10-PCS | Mod: HCNC,CPTII,S$GLB, | Performed by: INTERNAL MEDICINE

## 2019-02-11 PROCEDURE — 1101F PT FALLS ASSESS-DOCD LE1/YR: CPT | Mod: HCNC,CPTII,S$GLB, | Performed by: INTERNAL MEDICINE

## 2019-02-11 PROCEDURE — 87086 URINE CULTURE/COLONY COUNT: CPT | Mod: HCNC

## 2019-02-11 PROCEDURE — 81002 URINALYSIS NONAUTO W/O SCOPE: CPT | Mod: HCNC,S$GLB,, | Performed by: INTERNAL MEDICINE

## 2019-02-11 PROCEDURE — 3074F PR MOST RECENT SYSTOLIC BLOOD PRESSURE < 130 MM HG: ICD-10-PCS | Mod: HCNC,CPTII,S$GLB, | Performed by: INTERNAL MEDICINE

## 2019-02-11 PROCEDURE — 1101F PR PT FALLS ASSESS DOC 0-1 FALLS W/OUT INJ PAST YR: ICD-10-PCS | Mod: HCNC,CPTII,S$GLB, | Performed by: INTERNAL MEDICINE

## 2019-02-11 PROCEDURE — 81002 POCT URINE DIPSTICK WITHOUT MICROSCOPE: ICD-10-PCS | Mod: HCNC,S$GLB,, | Performed by: INTERNAL MEDICINE

## 2019-02-11 PROCEDURE — 96372 PR INJECTION,THERAP/PROPH/DIAG2ST, IM OR SUBCUT: ICD-10-PCS | Mod: HCNC,S$GLB,, | Performed by: INTERNAL MEDICINE

## 2019-02-11 PROCEDURE — 99999 PR PBB SHADOW E&M-EST. PATIENT-LVL IV: ICD-10-PCS | Mod: PBBFAC,HCNC,, | Performed by: INTERNAL MEDICINE

## 2019-02-11 PROCEDURE — 99999 PR PBB SHADOW E&M-EST. PATIENT-LVL IV: CPT | Mod: PBBFAC,HCNC,, | Performed by: INTERNAL MEDICINE

## 2019-02-11 PROCEDURE — 96372 THER/PROPH/DIAG INJ SC/IM: CPT | Mod: HCNC,S$GLB,, | Performed by: INTERNAL MEDICINE

## 2019-02-11 RX ORDER — KETOROLAC TROMETHAMINE 30 MG/ML
30 INJECTION, SOLUTION INTRAMUSCULAR; INTRAVENOUS
Status: COMPLETED | OUTPATIENT
Start: 2019-02-11 | End: 2019-02-11

## 2019-02-11 RX ORDER — DICLOFENAC SODIUM 50 MG/1
50 TABLET, DELAYED RELEASE ORAL 2 TIMES DAILY
Qty: 14 TABLET | Refills: 0 | Status: SHIPPED | OUTPATIENT
Start: 2019-02-11 | End: 2019-02-17

## 2019-02-11 RX ORDER — CYCLOBENZAPRINE HCL 5 MG
5 TABLET ORAL 3 TIMES DAILY PRN
Qty: 10 TABLET | Refills: 0 | Status: SHIPPED | OUTPATIENT
Start: 2019-02-11 | End: 2019-02-17

## 2019-02-11 RX ADMIN — KETOROLAC TROMETHAMINE 30 MG: 30 INJECTION, SOLUTION INTRAMUSCULAR; INTRAVENOUS at 10:02

## 2019-02-11 NOTE — PROGRESS NOTES
Assessment and Plan:    1. Acute bilateral low back pain without sciatica  Patient appears to have acute exacerbation of known intermittent chronic low back pain in the setting of diarrhea of several days duration. Patient expressed concern about this being a kidney infection again, but given the lack of fevers, lack of CVA tenderness, bilateral nature of the pain, and no urinary symptoms I think this is less likely. Will send urine for culture, but I favor MSK etiology. Discussed use of short course of NSAIDs, heat, gentle stretching, and PRN use of muscle relaxer for a couple of days. Discussed PT, patient declined (has done PT for back pain in the past and did not find this helpful).   - ketorolac injection 30 mg  - diclofenac (VOLTAREN) 50 MG EC tablet; Take 1 tablet (50 mg total) by mouth 2 (two) times daily. for 7 days  Dispense: 14 tablet; Refill: 0  - cyclobenzaprine (FLEXERIL) 5 MG tablet; Take 1 tablet (5 mg total) by mouth 3 (three) times daily as needed for Muscle spasms.  Dispense: 10 tablet; Refill: 0    2. Flank pain  - POCT URINE DIPSTICK WITHOUT MICROSCOPE  - Urine culture; Future  - Urine culture      ______________________________________________________________________  Subjective:    Chief Complaint:  Back pain    HPI:   Elicia is a 73 y.o. year old woman here for evaluation of low back pain. She is new to me, she is a patient of Dr. Garcia.    She reports that she started having low back pain 2 days ago. She had been having some sort of gastroenteritis the previous week, was having a lot of diarrhea for 3 days, had spent a lot of time running to the bathroom and sitting quickly which she generally tries to avoid. She does have a history of low back pain with radiculopathy. She reports that the back pain is excruciating when she tries to move certain directions. Back pain is on both sides of her low back and radiates toward the front. The pain does not radiate down either leg. Pain is described  as aching. She has taken tylenol for the pain and this has been partially helpful.     She was notably treated for acute pyelonephritis in October, however cultures did not grow any organism. She has not had any pain or burning with urination. No fevers or chills. She did have the diarrhea last week, but no current GI symptoms at all.     Medications:  Current Outpatient Medications on File Prior to Visit   Medication Sig Dispense Refill    acetaminophen (TYLENOL EXTRA STRENGTH) 500 MG tablet Take 500 mg by mouth 3 (three) times daily as needed for Pain.      ascorbic acid, vitamin C, (VITAMIN C) 500 MG tablet Take 500 mg by mouth once daily.      aspirin (ECOTRIN) 81 MG EC tablet Take 81 mg by mouth once daily.        calcium-vitamin D 500-125 mg-unit tablet Take 1 tablet by mouth once daily.        cetirizine (ZYRTEC) 10 MG tablet Take 10 mg by mouth once daily.      cholestyramine, with sugar, 4 gram Powd 4 g or 1 scoop to be mixed in 8 oz water daily; if tolerated after 1 week increase to 2x a day. 378 g 2    ezetimibe (ZETIA) 10 mg tablet Take 1 tablet (10 mg total) by mouth once daily. 30 tablet 2    fish oil-omega-3 fatty acids 300-1,000 mg capsule Take 1,000 mg by mouth once daily.       GLUCOSAMINE HCL/CHONDR VILLASENOR A NA (OSTEO BI-FLEX ORAL) Take by mouth once daily.      hydrocodone-acetaminophen 7.5-325mg (NORCO) 7.5-325 mg per tablet Take 1 tablet by mouth every 6 (six) hours as needed for Pain.      methylcellulose, laxative, (CITRUCEL) 500 mg Tab Take by mouth once daily.      metoprolol succinate (TOPROL-XL) 50 MG 24 hr tablet TAKE 1 TABLET EVERY DAY 90 tablet 1    multivitamin (MULTIVITAMIN) per tablet Take 1 tablet by mouth once daily.        mv-min-FA-D3-om3-dha-epa-fish (CARDIAMIN) 200 mcg-500 unit-200 mg Cap Take by mouth.      nutritional supplement-fiber Liqd Take by mouth.      nutritional supplements Liqd Take by mouth.      nystatin (MYCOSTATIN) cream Apply topically 2 (two)  "times daily. 30 g 2    ramipril (ALTACE) 10 MG capsule Take 10 mg by mouth once daily.      terconazole (TERAZOL 7) 0.4 % Crea Place 1 applicator vaginally every evening. 45 g 4    triamcinolone (NASACORT) 55 mcg nasal inhaler 2 sprays by Nasal route once daily.      UBIDECARENONE (COENZYME Q10) 100 mg Tab Take 100 mg by mouth once daily.       vitamin D 185 MG Tab Take 500 mg by mouth once daily.       vitamin E 400 UNIT capsule Take 400 Units by mouth once daily.      VITAMIN E/FLAXSEED OIL (FLAXSEED OIL-VITAMIN E) 1,000-1 mg-unit Cap Take 1,000 mg by mouth once daily.         No current facility-administered medications on file prior to visit.        Review of Systems:  Review of Systems   Constitutional: Negative for chills and fever.   Gastrointestinal: Positive for diarrhea. Negative for abdominal pain, blood in stool, constipation, nausea and vomiting.   Genitourinary: Negative for difficulty urinating, dysuria, frequency, hematuria and urgency.   Musculoskeletal: Positive for back pain and gait problem.   Neurological: Negative for weakness and numbness.       Past Medical History:  Past Medical History:   Diagnosis Date    Carotid artery stenosis     Coronary artery disease     Hyperlipidemia     Hypertension     Obesity     Paroxysmal supraventricular tachycardia        Objective:    Vitals:  Vitals:    02/11/19 0950   BP: 124/80   Pulse: 64   Temp: 97.8 °F (36.6 °C)   Weight: 116.8 kg (257 lb 8 oz)   Height: 5' 5" (1.651 m)   PainSc:   6   PainLoc: Back       Physical Exam   Constitutional: She is oriented to person, place, and time. She appears well-developed and well-nourished. No distress.   HENT:   Mouth/Throat: Oropharynx is clear and moist.   Eyes: Conjunctivae are normal. Right eye exhibits no discharge. Left eye exhibits no discharge.   Cardiovascular: Normal rate and regular rhythm.   Pulmonary/Chest: Effort normal. No respiratory distress.   Abdominal: Soft. She exhibits no " distension. There is no tenderness. There is no rebound and no guarding.   no suprapubic tenderness   Musculoskeletal:   bilateral lumbar paraspinal muscles are tense and minimally tender to palpation, no spinous process tenderness, no CVA tenderness; ROM of lumbar spine is limited in all directions due to pain   Neurological: She is alert and oriented to person, place, and time.   bilateral LEs intact to light touch, gait appears antalgic but otherwise normal   Skin: Skin is warm and dry. She is not diaphoretic.   Psychiatric: She has a normal mood and affect. Her behavior is normal. Judgment and thought content normal.   Vitals reviewed.      Data:  POCT UA with ++ LE and negative nitrites    Winter Werner MD  Internal Medicine

## 2019-02-12 LAB — BACTERIA UR CULT: NORMAL

## 2019-02-15 ENCOUNTER — TELEPHONE (OUTPATIENT)
Dept: FAMILY MEDICINE | Facility: CLINIC | Age: 74
End: 2019-02-15

## 2019-02-15 NOTE — TELEPHONE ENCOUNTER
PA request rec'd from [email or web address] for Flexeril.    PA Initiated via [email or web address]  Key: [id number]    Awaiting response.

## 2019-02-16 ENCOUNTER — NURSE TRIAGE (OUTPATIENT)
Dept: ADMINISTRATIVE | Facility: CLINIC | Age: 74
End: 2019-02-16

## 2019-02-16 NOTE — TELEPHONE ENCOUNTER
"  Reason for Disposition   [1] Coughing continuously (nonstop) AND [2] keeps from working or sleeping AND [3] not improved after 2 nebulizer or inhaler treatments given 20 minutes apart    Answer Assessment - Initial Assessment Questions  1. RESPIRATORY STATUS: "Describe your breathing?" (e.g., wheezing, shortness of breath, unable to speak, severe coughing)     SOB with exertion   2. ONSET: "When did this asthma attack begin?"     yest   3. TRIGGER: "What do you think triggered this attack?" (e.g., URI, exposure to pollen or other allergen, tobacco smoke)      Ibuprofen   4. PEAK EXPIRATORY FLOW RATE (PEFR): "Do you use a peak flow meter?" If so, ask: "What's the current peak flow? What's your personal best peak flow?"      N/a   5. SEVERITY: "How bad is this attack?"     - MILD: No SOB at rest, mild SOB with walking, speaks normally in sentences, can lay down, no retractions, pulse < 100. (GREEN Zone: PEFR %)    - MODERATE: SOB at rest, SOB with minimal exertion and prefers to sit, cannot lie down flat, speaks in phrases, mild retractions, audible wheezing, pulse 100-120. (YELLOW Zone: PEFR 50-80%)     - SEVERE: Very SOB at rest, speaks in single words, struggling to breathe, sitting hunched forward, retractions, usually loud wheezing, sometimes minimal wheezing because of decreased air movement, pulse > 120. (RED Zone: PEFR < 50%).     SOB with exertion   6. MEDICATIONS (Inhaler or nebs): "What are your asthma medications?" and "What treatments have you given so far?"     - Quick-relief: albuterol, metaproterenol, salbutamol, or other inhaled or nebulized beta-agonist medicines    - Long-term-control: steroids, cromolyn, or other anti-inflammatory medicines.   no , hx asthma at child   7. OTHER SYMPTOMS: "Do you have any other symptoms? (e.g., runny nose, chest pain, fever)    No    Protocols used: ST ASTHMA ATTACK-A-AH  Pt called re back pain, given meds x 7 days poss allergy to ibuprofen - hx heart dz. " Hx asthma as child- no nebs or MDI at home. Denies swelling of lips tongue face, throat.   Stopped ibuprofen yest, had SOB sudden. Stopped med now , no edema.   SOB with exertion. top part of chest starts hurts- pt dismissed chest pain. Pt wants antidote for ibuprofen. rec ED due to SOB with exertion. Pt states she will go get evaluated. Call back with questions.

## 2019-02-17 PROBLEM — R06.09 DYSPNEA ON EXERTION: Status: ACTIVE | Noted: 2019-02-17

## 2019-02-17 PROBLEM — N30.00 ACUTE CYSTITIS WITHOUT HEMATURIA: Status: ACTIVE | Noted: 2019-02-17

## 2019-02-17 PROBLEM — R07.9 CHEST PAIN: Status: ACTIVE | Noted: 2019-02-17

## 2019-02-18 ENCOUNTER — TELEPHONE (OUTPATIENT)
Dept: FAMILY MEDICINE | Facility: CLINIC | Age: 74
End: 2019-02-18

## 2019-02-18 PROBLEM — I35.0 MODERATE AORTIC STENOSIS: Status: ACTIVE | Noted: 2019-02-18

## 2019-02-18 NOTE — TELEPHONE ENCOUNTER
----- Message from Winter Wrener MD sent at 2/14/2019  5:31 PM CST -----  Urine culture did not grow any bacteria, there are no signs of infection in your urine.

## 2019-02-18 NOTE — TELEPHONE ENCOUNTER
Tried to reach pt at home number. No answer. Unable to leave message. [facility] message sent. Pt has f/u appt w/ Dr Garcia on 3/11/19.     Upon review, pt is current;y in hospital.

## 2019-02-19 ENCOUNTER — TELEPHONE (OUTPATIENT)
Dept: FAMILY MEDICINE | Facility: CLINIC | Age: 74
End: 2019-02-19

## 2019-02-19 NOTE — TELEPHONE ENCOUNTER
----- Message from Stephen Mathur sent at 2/19/2019 10:37 AM CST -----  Type:  Patient Call Back    Who Called:  RN [facility]   Does the patient know what this is regarding?: PT NEEDS A HOSPITAL F/U APPOINTMENT WITHIN A WEEK. PLEASE CALL PT TO SCHEDULE.   Best Call Back Number:  [phone]  Additional Information:  Please call pt and leave a detailed message if there is no answer.

## 2019-02-20 NOTE — TELEPHONE ENCOUNTER
----- Message from Leonila Woods sent at 2/20/2019  4:22 PM CST -----  Contact: [phone]  Patient is returning nurse's phone call.  Please call patient back at [phone].

## 2019-02-20 NOTE — TELEPHONE ENCOUNTER
It is okay to schedule Ms. Cavazos in a new patient spot for hospital follow-up next week if needed.  Thank you for enquiring

## 2019-02-27 ENCOUNTER — LAB VISIT (OUTPATIENT)
Dept: LAB | Facility: HOSPITAL | Age: 74
End: 2019-02-27
Attending: INTERNAL MEDICINE
Payer: MEDICARE

## 2019-02-27 DIAGNOSIS — E66.01 MORBID OBESITY: ICD-10-CM

## 2019-02-27 DIAGNOSIS — I25.10 CORONARY ARTERIOSCLEROSIS: ICD-10-CM

## 2019-02-27 DIAGNOSIS — E78.00 PURE HYPERCHOLESTEROLEMIA: ICD-10-CM

## 2019-02-27 DIAGNOSIS — I10 BENIGN ESSENTIAL HTN: ICD-10-CM

## 2019-02-27 DIAGNOSIS — R60.0 BILATERAL LEG EDEMA: ICD-10-CM

## 2019-02-27 DIAGNOSIS — R73.02 IMPAIRED GLUCOSE TOLERANCE: ICD-10-CM

## 2019-02-27 DIAGNOSIS — E55.9 VITAMIN D DEFICIENCY: ICD-10-CM

## 2019-02-27 LAB
25(OH)D3+25(OH)D2 SERPL-MCNC: 66 NG/ML
ALBUMIN SERPL BCP-MCNC: 3.8 G/DL
ALP SERPL-CCNC: 94 U/L
ALT SERPL W/O P-5'-P-CCNC: 12 U/L
ANION GAP SERPL CALC-SCNC: 8 MMOL/L
AST SERPL-CCNC: 15 U/L
BASOPHILS # BLD AUTO: 0.08 K/UL
BASOPHILS NFR BLD: 1 %
BILIRUB SERPL-MCNC: 0.7 MG/DL
BNP SERPL-MCNC: 63 PG/ML
BUN SERPL-MCNC: 22 MG/DL
CALCIUM SERPL-MCNC: 10.1 MG/DL
CHLORIDE SERPL-SCNC: 102 MMOL/L
CHOLEST SERPL-MCNC: 204 MG/DL
CHOLEST/HDLC SERPL: 4.6 {RATIO}
CO2 SERPL-SCNC: 29 MMOL/L
CREAT SERPL-MCNC: 0.7 MG/DL
DIFFERENTIAL METHOD: NORMAL
EOSINOPHIL # BLD AUTO: 0.2 K/UL
EOSINOPHIL NFR BLD: 2.6 %
ERYTHROCYTE [DISTWIDTH] IN BLOOD BY AUTOMATED COUNT: 14 %
EST. GFR  (AFRICAN AMERICAN): >60 ML/MIN/1.73 M^2
EST. GFR  (NON AFRICAN AMERICAN): >60 ML/MIN/1.73 M^2
ESTIMATED AVG GLUCOSE: 108 MG/DL
GLUCOSE SERPL-MCNC: 108 MG/DL
HBA1C MFR BLD HPLC: 5.4 %
HCT VFR BLD AUTO: 37.8 %
HDLC SERPL-MCNC: 44 MG/DL
HDLC SERPL: 21.6 %
HGB BLD-MCNC: 12.4 G/DL
IMM GRANULOCYTES # BLD AUTO: 0.03 K/UL
IMM GRANULOCYTES NFR BLD AUTO: 0.4 %
LDLC SERPL CALC-MCNC: 135.4 MG/DL
LYMPHOCYTES # BLD AUTO: 1.7 K/UL
LYMPHOCYTES NFR BLD: 20.5 %
MAGNESIUM SERPL-MCNC: 2 MG/DL
MCH RBC QN AUTO: 27.7 PG
MCHC RBC AUTO-ENTMCNC: 32.8 G/DL
MCV RBC AUTO: 84 FL
MONOCYTES # BLD AUTO: 0.7 K/UL
MONOCYTES NFR BLD: 8.2 %
NEUTROPHILS # BLD AUTO: 5.7 K/UL
NEUTROPHILS NFR BLD: 67.3 %
NONHDLC SERPL-MCNC: 160 MG/DL
NRBC BLD-RTO: 0 /100 WBC
PLATELET # BLD AUTO: 305 K/UL
PMV BLD AUTO: 11.2 FL
POTASSIUM SERPL-SCNC: 4.4 MMOL/L
PROT SERPL-MCNC: 7.7 G/DL
RBC # BLD AUTO: 4.48 M/UL
SODIUM SERPL-SCNC: 139 MMOL/L
T4 FREE SERPL-MCNC: 1.07 NG/DL
TRIGL SERPL-MCNC: 123 MG/DL
TSH SERPL DL<=0.005 MIU/L-ACNC: 0.87 UIU/ML
WBC # BLD AUTO: 8.4 K/UL

## 2019-02-27 PROCEDURE — 85025 COMPLETE CBC W/AUTO DIFF WBC: CPT | Mod: HCNC

## 2019-02-27 PROCEDURE — 83735 ASSAY OF MAGNESIUM: CPT | Mod: HCNC

## 2019-02-27 PROCEDURE — 83880 ASSAY OF NATRIURETIC PEPTIDE: CPT | Mod: HCNC

## 2019-02-27 PROCEDURE — 80053 COMPREHEN METABOLIC PANEL: CPT | Mod: HCNC

## 2019-02-27 PROCEDURE — 82306 VITAMIN D 25 HYDROXY: CPT | Mod: HCNC

## 2019-02-27 PROCEDURE — 80061 LIPID PANEL: CPT | Mod: HCNC

## 2019-02-27 PROCEDURE — 84439 ASSAY OF FREE THYROXINE: CPT | Mod: HCNC

## 2019-02-27 PROCEDURE — 36415 COLL VENOUS BLD VENIPUNCTURE: CPT | Mod: HCNC,PO

## 2019-02-27 PROCEDURE — 83036 HEMOGLOBIN GLYCOSYLATED A1C: CPT | Mod: HCNC

## 2019-02-27 PROCEDURE — 84443 ASSAY THYROID STIM HORMONE: CPT | Mod: HCNC

## 2019-03-11 ENCOUNTER — OFFICE VISIT (OUTPATIENT)
Dept: FAMILY MEDICINE | Facility: CLINIC | Age: 74
End: 2019-03-11
Payer: MEDICARE

## 2019-03-11 VITALS
HEIGHT: 65 IN | HEART RATE: 68 BPM | WEIGHT: 250.56 LBS | SYSTOLIC BLOOD PRESSURE: 122 MMHG | DIASTOLIC BLOOD PRESSURE: 70 MMHG | BODY MASS INDEX: 41.74 KG/M2

## 2019-03-11 DIAGNOSIS — I35.0 MODERATE AORTIC STENOSIS: ICD-10-CM

## 2019-03-11 DIAGNOSIS — E78.00 PURE HYPERCHOLESTEROLEMIA: ICD-10-CM

## 2019-03-11 DIAGNOSIS — Z78.9 STATIN INTOLERANCE: ICD-10-CM

## 2019-03-11 DIAGNOSIS — R73.02 IMPAIRED GLUCOSE TOLERANCE: ICD-10-CM

## 2019-03-11 DIAGNOSIS — E66.01 MORBID OBESITY: ICD-10-CM

## 2019-03-11 DIAGNOSIS — I25.10 CORONARY ARTERY DISEASE, ANGINA PRESENCE UNSPECIFIED, UNSPECIFIED VESSEL OR LESION TYPE, UNSPECIFIED WHETHER NATIVE OR TRANSPLANTED HEART: ICD-10-CM

## 2019-03-11 DIAGNOSIS — I34.0 MILD MITRAL REGURGITATION: ICD-10-CM

## 2019-03-11 DIAGNOSIS — Z09 HOSPITAL DISCHARGE FOLLOW-UP: Primary | ICD-10-CM

## 2019-03-11 DIAGNOSIS — J81.0 ACUTE PULMONARY EDEMA: ICD-10-CM

## 2019-03-11 DIAGNOSIS — E55.9 VITAMIN D DEFICIENCY: ICD-10-CM

## 2019-03-11 DIAGNOSIS — I10 BENIGN ESSENTIAL HTN: ICD-10-CM

## 2019-03-11 PROCEDURE — 99999 PR PBB SHADOW E&M-EST. PATIENT-LVL IV: ICD-10-PCS | Mod: PBBFAC,HCNC,, | Performed by: INTERNAL MEDICINE

## 2019-03-11 PROCEDURE — 3078F DIAST BP <80 MM HG: CPT | Mod: HCNC,CPTII,S$GLB, | Performed by: INTERNAL MEDICINE

## 2019-03-11 PROCEDURE — 3074F SYST BP LT 130 MM HG: CPT | Mod: HCNC,CPTII,S$GLB, | Performed by: INTERNAL MEDICINE

## 2019-03-11 PROCEDURE — 1101F PT FALLS ASSESS-DOCD LE1/YR: CPT | Mod: HCNC,CPTII,S$GLB, | Performed by: INTERNAL MEDICINE

## 2019-03-11 PROCEDURE — 1101F PR PT FALLS ASSESS DOC 0-1 FALLS W/OUT INJ PAST YR: ICD-10-PCS | Mod: HCNC,CPTII,S$GLB, | Performed by: INTERNAL MEDICINE

## 2019-03-11 PROCEDURE — 3074F PR MOST RECENT SYSTOLIC BLOOD PRESSURE < 130 MM HG: ICD-10-PCS | Mod: HCNC,CPTII,S$GLB, | Performed by: INTERNAL MEDICINE

## 2019-03-11 PROCEDURE — 99999 PR PBB SHADOW E&M-EST. PATIENT-LVL IV: CPT | Mod: PBBFAC,HCNC,, | Performed by: INTERNAL MEDICINE

## 2019-03-11 PROCEDURE — 99214 PR OFFICE/OUTPT VISIT, EST, LEVL IV, 30-39 MIN: ICD-10-PCS | Mod: HCNC,S$GLB,, | Performed by: INTERNAL MEDICINE

## 2019-03-11 PROCEDURE — 3078F PR MOST RECENT DIASTOLIC BLOOD PRESSURE < 80 MM HG: ICD-10-PCS | Mod: HCNC,CPTII,S$GLB, | Performed by: INTERNAL MEDICINE

## 2019-03-11 PROCEDURE — 99214 OFFICE O/P EST MOD 30 MIN: CPT | Mod: HCNC,S$GLB,, | Performed by: INTERNAL MEDICINE

## 2019-03-11 RX ORDER — LOVASTATIN 40 MG/1
1 TABLET ORAL DAILY
COMMUNITY
Start: 2019-02-08 | End: 2019-06-10

## 2019-03-11 RX ORDER — HYDROCHLOROTHIAZIDE 12.5 MG/1
1 TABLET ORAL DAILY
COMMUNITY
Start: 2019-03-06 | End: 2020-02-05 | Stop reason: SDUPTHER

## 2019-03-11 NOTE — PROGRESS NOTES
Subjective:       Patient ID: Elicia Cavazos is a 73 y.o. female.    Chief Complaint: Follow-up (hospital follow up ([facility]))    HPI   patient recently hospitalized for observation [facility] 02/17/2019 through 02/19/2019 chest pressure with dyspnea on exertion symptoms resolved with diuresis with Lasix  w troponin <0.012 x3; patient apparently received Lasix 20 mg in the ER IV x2 with good diuresis.  Reassess the next day symptoms resolved after much voiding. CAD w mild-to-moderate AS; chest x-ray showed enlargement of the cardiac silhouette with with developing pulmonary edema. BNP reportedly was 925.  Patient reportedly had good diuresis in the hospital and was discharged home with the addition of HCTZ.  Patient to see Dr. Trinidad her cardiologist in follow-up as an outpatient. Has seen him last week and he reduced HCTZ from 25 to 12.5 mg a day. No LE edema; breathing back to nl. No CP or pressure.     Transthoracic echo: TTE 2/17/18:  Left Ventricle Normal ejection fraction at 55%. Normal wall thickness observed. Normal left ventricular diastolic function.   Right Ventricle Normal cavity size, wall thickness and ejection fraction. Wall motion normal.   Left Atrium Moderate atrial enlargement.   Right Atrium Mild atrial enlargement.   Aortic Valve There is moderate of the aortic valve present. Mild to moderate stenosis. Trace regurgitation. GUERLINE is 1.56 cm2; mean gradient is 12 mmHg; peak gradient is mmHg; AV peak velocity is m/s.   Mitral Valve Normal valve structure. No stenosis. Mild regurgitation. Normal leaflet mobility.   Tricuspid Valve The tricuspid valve is normal. Mild regurgitation. The estimated PA systolic pressure is 37 mmHg.   Pulmonic Valve The pulmonic valve was not well visualized.   IVC/SVC Normal central venous pressure (3 mm Hg).       Patient also with labs her discussed drawn recently since her hospital discharge.  BNP has markedly improved from 925-63.  Fasting blood sugars 108 with  "hemoglobin A1c of 5.4 renal and hepatic function are within normal limits.  Vitamin-D level returned back 66 will stopper D3 500 IU per day.  Cholesterol returned back 204 with HDL 44 and .  Will try increasing her lovastatin to 40 mg b.i.d. and add Qunol 100 mg p.o. daily, as she has a history of coronary artery disease and needs better lipid control.  Her anemia has resolved by her CBC.  Total time 950-1035; greater than 50% of time spent in discussion, counseling, and review.  Labs reviewed and ordered for follow-up medications reviewed and adjusted    Review of Systems   Constitutional: Negative for appetite change, fever and unexpected weight change.   HENT: Negative for congestion, postnasal drip, rhinorrhea and sinus pressure.    Eyes: Negative for discharge and itching.   Respiratory: Negative for cough, chest tightness, shortness of breath and wheezing.    Cardiovascular: Negative for chest pain, palpitations and leg swelling.   Gastrointestinal: Negative for abdominal distention, abdominal pain, blood in stool, constipation, diarrhea, nausea and vomiting.   Endocrine: Negative for polydipsia, polyphagia and polyuria.   Genitourinary: Negative for dysuria and hematuria.   Musculoskeletal: Negative for arthralgias and myalgias.   Skin: Negative for rash.   Allergic/Immunologic: Negative for environmental allergies and food allergies.   Neurological: Negative for tremors, seizures and syncope.   Hematological: Negative for adenopathy. Does not bruise/bleed easily.   Psychiatric/Behavioral:        Denies anxiety or depression.        Objective:      Vitals:    03/11/19 0921   BP: 122/70   Pulse: 68   Weight: 113.6 kg (250 lb 8.8 oz)   Height: 5' 5" (1.651 m)     Body mass index is 41.69 kg/m².    Physical Exam   Constitutional: She is oriented to person, place, and time. She appears well-developed and well-nourished.   HENT:   Head: Normocephalic and atraumatic.   Eyes: EOM are normal.   Neck: Normal " range of motion. Neck supple. No thyromegaly present.   Cardiovascular: Normal rate and regular rhythm. Exam reveals no gallop.   No murmur heard.  HOMER 3/6 aortic. HOMER 2/6 pulm; HOMER 2/6 less then pulm.   Pulmonary/Chest: Effort normal and breath sounds normal. No respiratory distress. She has no wheezes. She has no rales.   Abdominal: Soft. Bowel sounds are normal. She exhibits no distension. There is no tenderness. There is no rebound and no guarding.   Musculoskeletal: Normal range of motion. She exhibits edema.   Obese LE's w 2+ edema; spider veins noted. No calf tenderness to palp.   Lymphadenopathy:     She has no cervical adenopathy.   Neurological: She is alert and oriented to person, place, and time.   Moves all 4 extremities fine.   Skin: No rash noted.   Psychiatric: She has a normal mood and affect. Her behavior is normal. Thought content normal.   Vitals reviewed.      Assessment:       1. Hospital discharge follow-up    2. Acute pulmonary edema    3. Moderate aortic stenosis    4. Mild mitral regurgitation    5. Coronary artery disease, angina presence unspecified, unspecified vessel or lesion type, unspecified whether native or transplanted heart    6. Benign essential HTN    7. Pure hypercholesterolemia    8. Statin intolerance    9. Impaired glucose tolerance    10. Morbid obesity    11. Vitamin D deficiency        Plan:       Hospital discharge follow-up; doing a lot better since discharge cardiology reduced her HCTZ from 25 to-12.5 mg daily.  Suspected acute pulmonary edema with dyspnea on exertion and chest pressure having resolved in the hospital with good diuresis after Lasix IV total 40 mg initiated in ER; symptoms had cleared by the next hospital day  -     Comprehensive metabolic panel; Future; Expected date: 03/11/2019  -     Magnesium; Future; Expected date: 03/11/2019  -     TSH; Future; Expected date: 03/11/2019  -     T4, free; Future; Expected date: 03/11/2019  -     Brain natriuretic  peptide; Future; Expected date: 03/11/2019    Acute pulmonary edema  -     Comprehensive metabolic panel; Future; Expected date: 03/11/2019  -     Magnesium; Future; Expected date: 03/11/2019  -     TSH; Future; Expected date: 03/11/2019  -     T4, free; Future; Expected date: 03/11/2019  -     Brain natriuretic peptide; Future; Expected date: 03/11/2019    Moderate aortic stenosis; mild to moderate aortic stenosis followed by Cardiology Dr. Vj Rachel    Mild mitral regurgitation    Coronary artery disease, angina presence unspecified, unspecified vessel or lesion type, unspecified whether native or transplanted heart; has been stable; continue present management.    Benign essential HTN. Maintain < 2 Gm Na a day diet, and monitor BP at home; keep a log.  Same treatment    Pure hypercholesterolemia; has statin intolerance but tolerating lovastatin 40 mg p.o. q.h.s..  Also on Zetia 10 mg a day generic; and cholecystotomy twice a day.  As well as cardio min and fish oil with omega-3 fatty acids. Add Qunol 100 mg a day.   -     Comprehensive metabolic panel; Future; Expected date: 03/11/2019  -     Lipid panel; Future; Expected date: 03/11/2019  -     TSH; Future; Expected date: 03/11/2019  -     T4, free; Future; Expected date: 03/11/2019    Statin intolerance; but can tolerate lovastatin    Impaired glucose tolerance. Exercise recommended with weight reduction and low carb diet; we'll follow hemoglobin A1c's with you periodically.  -     Comprehensive metabolic panel; Future; Expected date: 03/11/2019    Morbid obesity; Caloric restriction w regular exercise and weight reduction.   .   Vitamin D deficiency;  stop Vitamin D3 500 IU daily

## 2019-03-11 NOTE — PATIENT INSTRUCTIONS
Hospital discharge follow-up; doing a lot better since discharge cardiology reduced her HCTZ from 25 to-12.5 mg daily.  Suspected acute pulmonary edema with dyspnea on exertion and chest pressure having resolved in the hospital with good diuresis after Lasix IV total 40 mg initiated in ER; symptoms had cleared by the next hospital day  -     Comprehensive metabolic panel; Future; Expected date: 03/11/2019  -     Magnesium; Future; Expected date: 03/11/2019  -     TSH; Future; Expected date: 03/11/2019  -     T4, free; Future; Expected date: 03/11/2019  -     Brain natriuretic peptide; Future; Expected date: 03/11/2019    Acute pulmonary edema  -     Comprehensive metabolic panel; Future; Expected date: 03/11/2019  -     Magnesium; Future; Expected date: 03/11/2019  -     TSH; Future; Expected date: 03/11/2019  -     T4, free; Future; Expected date: 03/11/2019  -     Brain natriuretic peptide; Future; Expected date: 03/11/2019    Moderate aortic stenosis; mild to moderate aortic stenosis followed by Cardiology Dr. Vj Rachel    Mild mitral regurgitation    Coronary artery disease, angina presence unspecified, unspecified vessel or lesion type, unspecified whether native or transplanted heart; has been stable; continue present management.    Benign essential HTN. Maintain < 2 Gm Na a day diet, and monitor BP at home; keep a log.  Same treatment    Pure hypercholesterolemia; has statin intolerance but tolerating lovastatin 40 mg p.o. q.h.s..  Also on Zetia 10 mg a day generic; and cholecystotomy twice a day.  As well as cardio min and fish oil with omega-3 fatty acids. Add Qunol 100 mg a day.   -     Comprehensive metabolic panel; Future; Expected date: 03/11/2019  -     Lipid panel; Future; Expected date: 03/11/2019  -     TSH; Future; Expected date: 03/11/2019  -     T4, free; Future; Expected date: 03/11/2019    Statin intolerance; but can tolerate lovastatin    Impaired glucose tolerance. Exercise recommended with  weight reduction and low carb diet; we'll follow hemoglobin A1c's with you periodically.  -     Comprehensive metabolic panel; Future; Expected date: 03/11/2019    Morbid obesity; Caloric restriction w regular exercise and weight reduction.   .   Vitamin D deficiency;  stop Vitamin D3 500 IU daily

## 2019-06-04 ENCOUNTER — LAB VISIT (OUTPATIENT)
Dept: LAB | Facility: HOSPITAL | Age: 74
End: 2019-06-04
Attending: INTERNAL MEDICINE
Payer: MEDICARE

## 2019-06-04 DIAGNOSIS — E78.00 PURE HYPERCHOLESTEROLEMIA: ICD-10-CM

## 2019-06-04 DIAGNOSIS — R73.02 IMPAIRED GLUCOSE TOLERANCE: ICD-10-CM

## 2019-06-04 DIAGNOSIS — Z09 HOSPITAL DISCHARGE FOLLOW-UP: ICD-10-CM

## 2019-06-04 DIAGNOSIS — J81.0 ACUTE PULMONARY EDEMA: ICD-10-CM

## 2019-06-04 LAB
ALBUMIN SERPL BCP-MCNC: 3.5 G/DL (ref 3.5–5.2)
ALP SERPL-CCNC: 89 U/L (ref 55–135)
ALT SERPL W/O P-5'-P-CCNC: 11 U/L (ref 10–44)
ANION GAP SERPL CALC-SCNC: 8 MMOL/L (ref 8–16)
AST SERPL-CCNC: 13 U/L (ref 10–40)
BILIRUB SERPL-MCNC: 0.6 MG/DL (ref 0.1–1)
BNP SERPL-MCNC: 175 PG/ML (ref 0–99)
BUN SERPL-MCNC: 19 MG/DL (ref 8–23)
CALCIUM SERPL-MCNC: 9.4 MG/DL (ref 8.7–10.5)
CHLORIDE SERPL-SCNC: 106 MMOL/L (ref 95–110)
CHOLEST SERPL-MCNC: 219 MG/DL (ref 120–199)
CHOLEST/HDLC SERPL: 4.9 {RATIO} (ref 2–5)
CO2 SERPL-SCNC: 27 MMOL/L (ref 23–29)
CREAT SERPL-MCNC: 0.7 MG/DL (ref 0.5–1.4)
EST. GFR  (AFRICAN AMERICAN): >60 ML/MIN/1.73 M^2
EST. GFR  (NON AFRICAN AMERICAN): >60 ML/MIN/1.73 M^2
GLUCOSE SERPL-MCNC: 97 MG/DL (ref 70–110)
HDLC SERPL-MCNC: 45 MG/DL (ref 40–75)
HDLC SERPL: 20.5 % (ref 20–50)
LDLC SERPL CALC-MCNC: 152.8 MG/DL (ref 63–159)
MAGNESIUM SERPL-MCNC: 1.9 MG/DL (ref 1.6–2.6)
NONHDLC SERPL-MCNC: 174 MG/DL
POTASSIUM SERPL-SCNC: 4.3 MMOL/L (ref 3.5–5.1)
PROT SERPL-MCNC: 7.1 G/DL (ref 6–8.4)
SODIUM SERPL-SCNC: 141 MMOL/L (ref 136–145)
T4 FREE SERPL-MCNC: 0.86 NG/DL (ref 0.71–1.51)
TRIGL SERPL-MCNC: 106 MG/DL (ref 30–150)
TSH SERPL DL<=0.005 MIU/L-ACNC: 1.01 UIU/ML (ref 0.4–4)

## 2019-06-04 PROCEDURE — 84443 ASSAY THYROID STIM HORMONE: CPT | Mod: HCNC

## 2019-06-04 PROCEDURE — 80061 LIPID PANEL: CPT | Mod: HCNC

## 2019-06-04 PROCEDURE — 36415 COLL VENOUS BLD VENIPUNCTURE: CPT | Mod: HCNC,PO

## 2019-06-04 PROCEDURE — 83880 ASSAY OF NATRIURETIC PEPTIDE: CPT | Mod: HCNC

## 2019-06-04 PROCEDURE — 83735 ASSAY OF MAGNESIUM: CPT | Mod: HCNC

## 2019-06-04 PROCEDURE — 84439 ASSAY OF FREE THYROXINE: CPT | Mod: HCNC

## 2019-06-04 PROCEDURE — 80053 COMPREHEN METABOLIC PANEL: CPT | Mod: HCNC

## 2019-06-10 ENCOUNTER — OFFICE VISIT (OUTPATIENT)
Dept: FAMILY MEDICINE | Facility: CLINIC | Age: 74
End: 2019-06-10
Payer: MEDICARE

## 2019-06-10 VITALS
TEMPERATURE: 98 F | BODY MASS INDEX: 42.33 KG/M2 | HEIGHT: 65 IN | WEIGHT: 254.06 LBS | SYSTOLIC BLOOD PRESSURE: 140 MMHG | DIASTOLIC BLOOD PRESSURE: 60 MMHG | HEART RATE: 62 BPM | OXYGEN SATURATION: 96 %

## 2019-06-10 DIAGNOSIS — M62.838 MUSCLE SPASM: ICD-10-CM

## 2019-06-10 DIAGNOSIS — Z91.89 AT RISK FOR ADVERSE DRUG EVENT: ICD-10-CM

## 2019-06-10 DIAGNOSIS — I10 BENIGN ESSENTIAL HTN: Primary | ICD-10-CM

## 2019-06-10 DIAGNOSIS — E66.01 MORBID OBESITY: ICD-10-CM

## 2019-06-10 DIAGNOSIS — S39.012A STRAIN OF LUMBAR REGION, INITIAL ENCOUNTER: ICD-10-CM

## 2019-06-10 DIAGNOSIS — R73.02 IMPAIRED GLUCOSE TOLERANCE: ICD-10-CM

## 2019-06-10 DIAGNOSIS — M54.50 ACUTE BILATERAL LOW BACK PAIN WITHOUT SCIATICA: ICD-10-CM

## 2019-06-10 DIAGNOSIS — I25.10 CORONARY ARTERY DISEASE INVOLVING NATIVE CORONARY ARTERY OF NATIVE HEART WITHOUT ANGINA PECTORIS: ICD-10-CM

## 2019-06-10 DIAGNOSIS — E78.00 PURE HYPERCHOLESTEROLEMIA: ICD-10-CM

## 2019-06-10 DIAGNOSIS — I35.0 MODERATE AORTIC STENOSIS: ICD-10-CM

## 2019-06-10 PROCEDURE — 99999 PR PBB SHADOW E&M-EST. PATIENT-LVL IV: CPT | Mod: PBBFAC,HCNC,, | Performed by: INTERNAL MEDICINE

## 2019-06-10 PROCEDURE — 1101F PR PT FALLS ASSESS DOC 0-1 FALLS W/OUT INJ PAST YR: ICD-10-PCS | Mod: HCNC,CPTII,S$GLB, | Performed by: INTERNAL MEDICINE

## 2019-06-10 PROCEDURE — 3077F SYST BP >= 140 MM HG: CPT | Mod: HCNC,CPTII,S$GLB, | Performed by: INTERNAL MEDICINE

## 2019-06-10 PROCEDURE — 99999 PR PBB SHADOW E&M-EST. PATIENT-LVL IV: ICD-10-PCS | Mod: PBBFAC,HCNC,, | Performed by: INTERNAL MEDICINE

## 2019-06-10 PROCEDURE — 99214 PR OFFICE/OUTPT VISIT, EST, LEVL IV, 30-39 MIN: ICD-10-PCS | Mod: HCNC,25,S$GLB, | Performed by: INTERNAL MEDICINE

## 2019-06-10 PROCEDURE — 3078F DIAST BP <80 MM HG: CPT | Mod: HCNC,CPTII,S$GLB, | Performed by: INTERNAL MEDICINE

## 2019-06-10 PROCEDURE — 3077F PR MOST RECENT SYSTOLIC BLOOD PRESSURE >= 140 MM HG: ICD-10-PCS | Mod: HCNC,CPTII,S$GLB, | Performed by: INTERNAL MEDICINE

## 2019-06-10 PROCEDURE — 96372 THER/PROPH/DIAG INJ SC/IM: CPT | Mod: HCNC,S$GLB,, | Performed by: INTERNAL MEDICINE

## 2019-06-10 PROCEDURE — 1101F PT FALLS ASSESS-DOCD LE1/YR: CPT | Mod: HCNC,CPTII,S$GLB, | Performed by: INTERNAL MEDICINE

## 2019-06-10 PROCEDURE — 3078F PR MOST RECENT DIASTOLIC BLOOD PRESSURE < 80 MM HG: ICD-10-PCS | Mod: HCNC,CPTII,S$GLB, | Performed by: INTERNAL MEDICINE

## 2019-06-10 PROCEDURE — 96372 PR INJECTION,THERAP/PROPH/DIAG2ST, IM OR SUBCUT: ICD-10-PCS | Mod: HCNC,S$GLB,, | Performed by: INTERNAL MEDICINE

## 2019-06-10 PROCEDURE — 99214 OFFICE O/P EST MOD 30 MIN: CPT | Mod: HCNC,25,S$GLB, | Performed by: INTERNAL MEDICINE

## 2019-06-10 RX ORDER — BETAMETHASONE SODIUM PHOSPHATE AND BETAMETHASONE ACETATE 3; 3 MG/ML; MG/ML
6 INJECTION, SUSPENSION INTRA-ARTICULAR; INTRALESIONAL; INTRAMUSCULAR; SOFT TISSUE ONCE
Status: COMPLETED | OUTPATIENT
Start: 2019-06-10 | End: 2019-06-10

## 2019-06-10 RX ORDER — PITAVASTATIN CALCIUM 2.09 MG/1
2 TABLET, FILM COATED ORAL DAILY
Qty: 30 TABLET | Refills: 3 | Status: SHIPPED | OUTPATIENT
Start: 2019-06-10 | End: 2019-06-11

## 2019-06-10 RX ORDER — BACLOFEN 10 MG/1
TABLET ORAL
Qty: 30 TABLET | Refills: 0 | Status: SHIPPED | OUTPATIENT
Start: 2019-06-10 | End: 2021-11-16

## 2019-06-10 RX ORDER — TELMISARTAN 40 MG/1
40 TABLET ORAL DAILY
Qty: 90 TABLET | Refills: 1 | Status: SHIPPED | OUTPATIENT
Start: 2019-06-10 | End: 2019-11-25 | Stop reason: SDUPTHER

## 2019-06-10 RX ADMIN — BETAMETHASONE SODIUM PHOSPHATE AND BETAMETHASONE ACETATE 6 MG: 3; 3 INJECTION, SUSPENSION INTRA-ARTICULAR; INTRALESIONAL; INTRAMUSCULAR; SOFT TISSUE at 12:06

## 2019-06-10 NOTE — PROGRESS NOTES
Subjective:       Patient ID: Elicia Cavazos is a 73 y.o. female.    Chief Complaint: Results (lab results and routine follow up ) and Back Pain (Patient feels like she has constant pressure on her back, ocurring x1 month )    HPI  Here today to go over her labs and for reassessment internal medicine issues.   HTN: BP at home 135/78; watches her salt intake; BP here 140/60. On ramipril; discussed ACEI association w lung cancer; will change to telmisartan.     Pure hypercholesterolemia; on low fat high fiber diet; tolerates lovastatin and zetia fine along w questran..   CAD: no CP, SOB, or palp. Card is Dr Rachel.     Imapired glucose tolerance: watches her carb intake.     Vit D def: vit D halted w level 66 in end of Feb.     Lower back pain: for 1 month; nonradiating down her leg; lifts water case w grocery visit. Tylenol XS w some help; warm shower helps as well;. On 1-10, a 3-4 pain rating. No dysuria; no fever or chills; no change in urine flow. Use tylenol arthritis for pain. Will also consult physical therapy and check Xrays L-S sp.   Total time 11:00 a.m. to 12 noon.  Greater than 50% of time spent in discussion, counseling, and review; labs ordered for follow-up as well; meds reviewed and addressed.  Physical therapy consult placed and lumbar spine x-rays ordered    Review of Systems   Constitutional: Negative for appetite change, fever and unexpected weight change.   HENT: Negative for congestion, postnasal drip, rhinorrhea and sinus pressure.         Seasonal allergies   Eyes: Negative for discharge and itching.   Respiratory: Negative for cough, chest tightness, shortness of breath and wheezing.    Cardiovascular: Negative for chest pain, palpitations and leg swelling.   Gastrointestinal: Negative for abdominal distention, abdominal pain, blood in stool, constipation, diarrhea, nausea and vomiting.   Endocrine: Negative for polydipsia, polyphagia and polyuria.   Genitourinary: Negative for dysuria and  "hematuria.   Musculoskeletal: Positive for arthralgias. Negative for myalgias.        Lower back and fingers.    Skin: Negative for rash.   Allergic/Immunologic: Negative for environmental allergies and food allergies.   Neurological: Negative for tremors, seizures and syncope.   Hematological: Negative for adenopathy. Does not bruise/bleed easily.   Psychiatric/Behavioral:        Denies anxiety or depression       Objective:      Vitals:    06/10/19 0945   BP: (!) 140/60   Pulse: 62   Temp: 97.9 °F (36.6 °C)   TempSrc: Oral   SpO2: 96%   Weight: 115.2 kg (254 lb 1.3 oz)   Height: 5' 5" (1.651 m)     Body mass index is 42.28 kg/m².    Physical Exam   Constitutional: She is oriented to person, place, and time. She appears well-developed and well-nourished.   HENT:   Head: Normocephalic and atraumatic.   Eyes: EOM are normal.   Neck: Normal range of motion. Neck supple. No thyromegaly present.   Cardiovascular: Normal rate and regular rhythm. Exam reveals no gallop.   No murmur heard.  HOMER 2-3/6 aortic area. Hx of AS   Pulmonary/Chest: Effort normal and breath sounds normal. No respiratory distress. She has no wheezes. She has no rales.   Abdominal: Soft. Bowel sounds are normal. She exhibits no distension. There is no tenderness. There is no rebound and no guarding.   Musculoskeletal: Normal range of motion. She exhibits edema.   Obese lower extremities; spider veins w maybe 1-2+ edema; no calf tenderness to palp. slr NEG; ms 5/5 LE's   NL ROM hips and hips nontender. No L-S sp tenderness to palp.     Lymphadenopathy:     She has no cervical adenopathy.   Neurological: She is alert and oriented to person, place, and time.   Moves all 4 extremities fine.   Skin: No rash noted.   Psychiatric: She has a normal mood and affect. Her behavior is normal. Thought content normal.   Vitals reviewed.      Assessment:       1. Benign essential HTN    2. Pure hypercholesterolemia    3. Coronary artery disease involving native " coronary artery of native heart without angina pectoris    4. Moderate aortic stenosis    5. Morbid obesity    6. Impaired glucose tolerance    7. Acute bilateral low back pain without sciatica    8. At risk for adverse drug event    9. Strain of lumbar region, initial encounter    10. Muscle spasm        Plan:       Benign essential HTN.Maintain < 2 Gm Na a day diet, and monitor BP at home; keep a log. Same tx.   -     telmisartan (MICARDIS) 40 MG Tab; Take 1 tablet (40 mg total) by mouth once daily.  Dispense: 90 tablet; Refill: 1  -     Comprehensive metabolic panel; Future; Expected date: 06/10/2019  -     Lipid panel; Future; Expected date: 06/10/2019  -     Magnesium; Future; Expected date: 06/10/2019  -     CBC auto differential; Future; Expected date: 06/10/2019    At risk for adverse drug event; stop altace/ramipril due to ACEI association w lung cancer. Replace w telmisartan.   -     telmisartan (MICARDIS) 40 MG Tab; Take 1 tablet (40 mg total) by mouth once daily.  Dispense: 90 tablet; Refill: 1    Pure hypercholesterolemia; stop lovastatin. Maintain low fat high fiber diet, exercise regularly.  -     pitavastatin calcium (LIVALO) 2 mg Tab tablet; Take 1 tablet (2 mg total) by mouth once daily.  Dispense: 30 tablet; Refill: 3  -     Comprehensive metabolic panel; Future; Expected date: 06/10/2019  -     Lipid panel; Future; Expected date: 06/10/2019    Coronary artery disease involving native coronary artery of native heart without angina pectoris; has been stable; sees Dr Rachel.   -     pitavastatin calcium (LIVALO) 2 mg Tab tablet; Take 1 tablet (2 mg total) by mouth once daily.  Dispense: 30 tablet; Refill: 3  -     Comprehensive metabolic panel; Future; Expected date: 06/10/2019  -     Lipid panel; Future; Expected date: 06/10/2019    Moderate aortic stenosis; stable.     Morbid obesity.Caloric restriction w regular exercise and weight reduction.    Impaired glucose tolerance. Exercise recommended  with weight reduction and low carb diet; we'll follow hemoglobin A1c's with you periodically.    Acute bilateral low back pain without sciatica; no lifting grerater then 10 lbs; tylenol arthritis for pain as needed. Thermal heat applications.   Betamethasone acetate/phosphate 6 mg IM x1 in office.   -     Urinalysis Microscopic; Future; Expected date: 06/10/2019  -     Urinalysis; Future; Expected date: 06/10/2019  -     Ambulatory consult to Physical Therapy  -     baclofen (LIORESAL) 10 MG tablet; Use baclofen 10 mg po TID as needed for pain.  Dispense: 30 tablet; Refill: 0  -     X-Ray Lumbar Spine Complete 5 View; Future; Expected date: 06/10/2019    Strain of lumbar region, initial encounter; tylenol arthritis for pain   -     Ambulatory consult to Physical Therapy  -     baclofen (LIORESAL) 10 MG tablet; Use baclofen 10 mg po TID as needed for pain.  Dispense: 30 tablet; Refill: 0  -     X-Ray Lumbar Spine Complete 5 View; Future; Expected date: 06/10/2019    Muscle spasm; tylenol arthritis for pain as needed.   -     Ambulatory consult to Physical Therapy  -     baclofen (LIORESAL) 10 MG tablet; Use baclofen 10 mg po TID as needed for pain.  Dispense: 30 tablet; Refill: 0  -     X-Ray Lumbar Spine Complete 5 View; Future; Expected date: 06/10/2019    06/11/2019 addendum:  Insurance not covering pitavastatin 2 mg each evening so was canceled; patient placed back on lovastatin 40 mg but at 40 mg each morning and 40 mg each evening for a total of 80 mg per day.  She is to try to adhere to her diet better and include exercise and weight reduction.  She is to continue Zetia 10 mg per day and her Questran dosage

## 2019-06-10 NOTE — PATIENT INSTRUCTIONS
Benign essential HTN.Maintain < 2 Gm Na a day diet, and monitor BP at home; keep a log. Same tx.   -     telmisartan (MICARDIS) 40 MG Tab; Take 1 tablet (40 mg total) by mouth once daily.  Dispense: 90 tablet; Refill: 1  -     Comprehensive metabolic panel; Future; Expected date: 06/10/2019  -     Lipid panel; Future; Expected date: 06/10/2019  -     Magnesium; Future; Expected date: 06/10/2019  -     CBC auto differential; Future; Expected date: 06/10/2019    At risk for adverse drug event; stop altace/ramipril due to ACEI association w lung cancer. Replace w telmisartan.   -     telmisartan (MICARDIS) 40 MG Tab; Take 1 tablet (40 mg total) by mouth once daily.  Dispense: 90 tablet; Refill: 1    Pure hypercholesterolemia; stop lovastatin.Maintain low fat high fiber diet, exercise regularly.  -     pitavastatin calcium (LIVALO) 2 mg Tab tablet; Take 1 tablet (2 mg total) by mouth once daily.  Dispense: 30 tablet; Refill: 3  -     Comprehensive metabolic panel; Future; Expected date: 06/10/2019  -     Lipid panel; Future; Expected date: 06/10/2019    Coronary artery disease involving native coronary artery of native heart without angina pectoris; has been stable; sees Dr Rachel.   -     pitavastatin calcium (LIVALO) 2 mg Tab tablet; Take 1 tablet (2 mg total) by mouth once daily.  Dispense: 30 tablet; Refill: 3  -     Comprehensive metabolic panel; Future; Expected date: 06/10/2019  -     Lipid panel; Future; Expected date: 06/10/2019    Moderate aortic stenosis    Morbid obesity.Caloric restriction w regular exercise and weight reduction.    Impaired glucose tolerance.Exercise recommended with weight reduction and low carb diet; we'll follow hemoglobin A1c's with you periodically.    Acute bilateral low back pain without sciatica; no lifting grerater then 10 lbs; tylenol arthritis for pain as needed. Thermal heat applications.   -     Urinalysis Microscopic; Future; Expected date: 06/10/2019  -     Urinalysis;  Future; Expected date: 06/10/2019  -     Cancel: X-Ray Lumbar Spine Complete 5 View; Future; Expected date: 06/10/2019  -     Ambulatory consult to Physical Therapy  -     baclofen (LIORESAL) 10 MG tablet; Use baclofen 10 mg po TID as needed for pain.  Dispense: 30 tablet; Refill: 0  -     X-Ray Lumbar Spine Complete 5 View; Future; Expected date: 06/10/2019    Strain of lumbar region, initial encounter; tylenol arthritis for pain   -     Ambulatory consult to Physical Therapy  -     baclofen (LIORESAL) 10 MG tablet; Use baclofen 10 mg po TID as needed for pain.  Dispense: 30 tablet; Refill: 0  -     X-Ray Lumbar Spine Complete 5 View; Future; Expected date: 06/10/2019    Muscle spasm; tylenol arthritis for pain as needed.   -     Ambulatory consult to Physical Therapy  -     baclofen (LIORESAL) 10 MG tablet; Use baclofen 10 mg po TID as needed for pain.  Dispense: 30 tablet; Refill: 0  -     X-Ray Lumbar Spine Complete 5 View; Future; Expected date: 06/10/2019

## 2019-06-11 ENCOUNTER — TELEPHONE (OUTPATIENT)
Dept: FAMILY MEDICINE | Facility: CLINIC | Age: 74
End: 2019-06-11

## 2019-06-11 RX ORDER — LOVASTATIN 40 MG/1
TABLET ORAL
Qty: 180 TABLET | Refills: 1 | Status: SHIPPED | OUTPATIENT
Start: 2019-06-11 | End: 2019-12-09 | Stop reason: SDUPTHER

## 2019-06-11 NOTE — TELEPHONE ENCOUNTER
"We received a fax from [facility] pharmacy stating " Livalo 2 mg tablets are not covered by the patient's insurance."     Please advise and review on what alternate medication can replace this medication. Thank you in advance.     " None

## 2019-06-11 NOTE — TELEPHONE ENCOUNTER
PA Initiated, awaiting outcome  Elicia Cavazos Key: [id number] - PA Case ID: [id number] Need help? Call us at [phone]  Status  Sent to [facility]  Livalo 2MG tablets  Form  [employer] Electronic PA Form

## 2019-06-11 NOTE — TELEPHONE ENCOUNTER
----- Message from Fer Murrell sent at 6/11/2019 11:30 AM CDT -----  Contact: Patient  Type: Needs Medical Advice    Who Called:  Patient  Pharmacy name and phone #:    [id number] - [place] - [id number]  [id number]  [place] [address]  Phone: [phone] Fax: [phone]  Best Call Back Number: [phone]  Additional Information: Patient is calling to request alternative medication for pitavastatin calcium (LIVALO) 2 mg Tab tablet due to high cost at pharmacy. Please advise for alternative and send to above pharmacy

## 2019-06-11 NOTE — TELEPHONE ENCOUNTER
Please notify patient that we canceled her pitavastatin due to insurance not covering and sent in lovastatin 40 mg for her to take each morning and evening; for a total daily dose of 80 mg a day.  She is to continue her Zetia 10 mg a day and her daily Questran dosage and needs to adhere to low-fat high-fiber diet a lot more rigorously; please have her call us if she has any more questions

## 2019-06-12 NOTE — TELEPHONE ENCOUNTER
Discussed message with patient. Patient verbalized understanding. Patient did ask if Micardis Rx was correct as dosing was higher than altace. I explained that this is correct as the formulation is not the same as altace and requires a higher mg dosing.

## 2019-06-13 ENCOUNTER — HOSPITAL ENCOUNTER (OUTPATIENT)
Dept: RADIOLOGY | Facility: HOSPITAL | Age: 74
Discharge: HOME | End: 2019-06-13
Attending: INTERNAL MEDICINE
Payer: MEDICARE

## 2019-06-13 DIAGNOSIS — S39.012A STRAIN OF LUMBAR REGION, INITIAL ENCOUNTER: ICD-10-CM

## 2019-06-13 DIAGNOSIS — M62.838 MUSCLE SPASM: ICD-10-CM

## 2019-06-13 DIAGNOSIS — M54.50 ACUTE BILATERAL LOW BACK PAIN WITHOUT SCIATICA: ICD-10-CM

## 2019-06-13 PROCEDURE — 72110 X-RAY EXAM L-2 SPINE 4/>VWS: CPT | Mod: TC,HCNC,FY,PO

## 2019-06-13 PROCEDURE — 72110 XR LUMBAR SPINE COMPLETE 5 VIEW: ICD-10-PCS | Mod: 26,HCNC,, | Performed by: RADIOLOGY

## 2019-06-13 PROCEDURE — 72110 X-RAY EXAM L-2 SPINE 4/>VWS: CPT | Mod: 26,HCNC,, | Performed by: RADIOLOGY

## 2019-06-17 ENCOUNTER — TELEPHONE (OUTPATIENT)
Dept: FAMILY MEDICINE | Facility: CLINIC | Age: 74
End: 2019-06-17

## 2019-06-17 DIAGNOSIS — R82.998 LEUKOCYTES IN URINE: ICD-10-CM

## 2019-06-17 DIAGNOSIS — R39.89 SENSATION OF PRESSURE IN BLADDER AREA: Primary | ICD-10-CM

## 2019-06-17 NOTE — TELEPHONE ENCOUNTER
Those symptoms are not really specific, and the urine is not highly suggestive of infection, it just looks contaminated. I would recommend repeating a clean catch with culture.

## 2019-06-17 NOTE — TELEPHONE ENCOUNTER
----- Message from Zenaida Guadarrama LPN sent at 6/17/2019  3:16 PM CDT -----  In Dr Radha nino, pls advise     Spoke w/ pt , she feels some pressure when she urinates at night. Pt denies odor , denies burning. Pt has lower back pain but had Xrays done and will be doing PT . Back pain may or may not be related to urinary symptoms.Pls advise if pt needs treatment for UTI  --lp

## 2019-06-18 ENCOUNTER — LAB VISIT (OUTPATIENT)
Dept: LAB | Facility: HOSPITAL | Age: 74
End: 2019-06-18
Attending: INTERNAL MEDICINE
Payer: MEDICARE

## 2019-06-18 DIAGNOSIS — R82.998 LEUKOCYTES IN URINE: ICD-10-CM

## 2019-06-18 DIAGNOSIS — R39.89 SENSATION OF PRESSURE IN BLADDER AREA: ICD-10-CM

## 2019-06-18 LAB
BACTERIA #/AREA URNS HPF: ABNORMAL /HPF
BILIRUB UR QL STRIP: NEGATIVE
CLARITY UR: CLEAR
COLOR UR: YELLOW
GLUCOSE UR QL STRIP: NEGATIVE
HGB UR QL STRIP: NEGATIVE
KETONES UR QL STRIP: NEGATIVE
LEUKOCYTE ESTERASE UR QL STRIP: ABNORMAL
MICROSCOPIC COMMENT: ABNORMAL
NITRITE UR QL STRIP: NEGATIVE
PH UR STRIP: 5 [PH] (ref 5–8)
PROT UR QL STRIP: NEGATIVE
RBC #/AREA URNS HPF: 3 /HPF (ref 0–4)
SP GR UR STRIP: 1.02 (ref 1–1.03)
URN SPEC COLLECT METH UR: ABNORMAL
WBC #/AREA URNS HPF: 3 /HPF (ref 0–5)

## 2019-06-18 PROCEDURE — 81000 URINALYSIS NONAUTO W/SCOPE: CPT | Mod: HCNC,PO

## 2019-06-18 PROCEDURE — 87086 URINE CULTURE/COLONY COUNT: CPT | Mod: HCNC

## 2019-06-20 LAB — BACTERIA UR CULT: NORMAL

## 2019-06-25 ENCOUNTER — CLINICAL SUPPORT (OUTPATIENT)
Dept: REHABILITATION | Facility: HOSPITAL | Age: 74
End: 2019-06-25
Attending: INTERNAL MEDICINE
Payer: MEDICARE

## 2019-06-25 DIAGNOSIS — G89.29 CHRONIC BILATERAL LOW BACK PAIN WITHOUT SCIATICA: ICD-10-CM

## 2019-06-25 DIAGNOSIS — M54.50 CHRONIC BILATERAL LOW BACK PAIN WITHOUT SCIATICA: ICD-10-CM

## 2019-06-25 PROCEDURE — 97162 PT EVAL MOD COMPLEX 30 MIN: CPT | Mod: HCNC,PO | Performed by: PHYSICAL THERAPIST

## 2019-06-25 PROCEDURE — 97110 THERAPEUTIC EXERCISES: CPT | Mod: HCNC,PO | Performed by: PHYSICAL THERAPIST

## 2019-06-28 NOTE — PLAN OF CARE
[facility]  Physical Therapy Initial Evaluation    Name: Elicia Cavazos  Clinic Number: [id number]    Therapy Diagnosis:   Encounter Diagnosis   Name Primary?    Chronic bilateral low back pain without sciatica      Physician: Brett Garcia MD    Physician Orders: PT Eval and Treat   Medical Diagnosis from Referral:   M54.5 (ICD-10-CM) - Acute bilateral low back pain without sciatica   S39.012A (ICD-10-CM) - Strain of lumbar region, initial encounter   M62.838 (ICD-10-CM) - Muscle spasm       Evaluation Date: 6/25/2019  Authorization Period Expiration: 12/31/19  Plan of Care Expiration: 8/8/19  Visit # / Visits authorized: 1/ 20    Time In: 1005a  Time Out: 1100a  Total Billable Time: 55 minutes    Precautions: tachycardia    Subjective   Date of onset: 1.5 months ago  History of current condition - Mamta reports: insidious onset of pain without trauma. Only thing she can remember doing was lifting a case of water, but it did not work.  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 Pressure that hits her lower back in the middle of the night that puts a lot of pressure on her bladder, sometimes causing incontinence. This only happens at night when she is sleeping. Legs get heavy and numb with prolonged walking, back starts hurting, sits for 5 minutes and then she can stand up and walk again (this has been going on for a long time).      Medical History:   Past Medical History:   Diagnosis Date    Carotid artery stenosis     Coronary artery disease     Hyperlipidemia     Hypertension     Obesity     Paroxysmal supraventricular tachycardia        Surgical History:   Elicia Cavazos  has a past surgical history that includes Carpal tunnel release; Appendectomy; Cataract extraction bilateral w/ anterior vitrectomy; Hand surgery (01/01/2001); Eye surgery; Hysterectomy (1979); toenail extraction (Right, 2016); and Colonoscopy (N/A, 12/18/2015).    Medications:   Elicia has a current  medication list which includes the following prescription(s): acetaminophen, ascorbic acid (vitamin c), aspirin, baclofen, calcium-vitamin d, cetirizine, cholestyramine (with sugar), ezetimibe, fish oil-omega-3 fatty acids, glucosamine/chondr pires a sod, hydrochlorothiazide, lovastatin, methylcellulose (laxative), metoprolol succinate, multivitamin, mv-min-fa-d3-om3-dha-epa-fish, nutritional supplement-fiber, nutritional supplements, nystatin, telmisartan, terconazole, triamcinolone, coenzyme q10, vitamin e, and flaxseed oil-vitamin e.    Allergies:   Review of patient's allergies indicates:   Allergen Reactions    Crestor [rosuvastatin] Other (See Comments)     Muscle ache    Lipitor [atorvastatin] Other (See Comments)     Muscle ache    Pravachol [pravastatin] Other (See Comments)     Muscle ache    Sulfa (sulfonamide antibiotics) Swelling    Welchol [colesevelam] Other (See Comments)     Muscle ache    Zocor [simvastatin] Other (See Comments)     Muscle ache    Influenza virus vaccines Swelling and Rash     2014 flu vaccination        Imaging, xray: Lumbar 6/10/19  FINDINGS:  Redemonstrated is grade 1 spondylolisthesis at the L4-5 and L5-S1 levels.  There is approximately 10 mm anterolisthesis of L4 on L5 and 5 mm anterolisthesis of L5 on S1.  There is marked disc space narrowing at both levels.  There is multilevel facet joint arthropathy.  There is no acute fracture.  There is multilevel endplate osteophyte formation.    Prior Therapy: yes, long time ago went to [facility] (low back pain)  Social History: son and granddaughter live with her. No stairs.  Occupation: retired, [employer]    Prior Level of Function: no issues with bladder control, could only tolerate activity for 45 min - 1 hour.   Current Level of Function:  Bladder control issues, shorter tolerance for household chores    Pain:  Current 1/10, worst 10/10, best 1/10   Location: bilateral lower back   Description: pressure  Aggravating  Factors: walking, standing, stooping, lying down  Easing Factors: rest    Pts goals: get to where I can almost get painfree and get up at night without a problem    Objective     Posture: shifts often in chair, Fwd lean, rounded shoulders, FHP, L trunk lean, excessive lumbar lordosis  Palpation: lumbar paraspinalsTTP and hypertonicity  Sensation: light touch sensation intact  DTR: absent patellar and achilles reflexes  Range of Motion/Strength:     Lumbar AROM: Pain/Dysfunction with Movement:   Flexion 50 No reversal of lumbar lordosis   Extension 10    Right side bending 12 P!   Left side bending 8 p!   Right rotation max loss ROM    Left rotation max loss ROM      MMT RIGHT LEFT   Hip flex 5/5 5/5   Hip ext 4+/5 4+/5   Hip abd 4+/5 4+/5   Hip add 5/5 5/5   Knee flex 5/5 5/5   Knee ext 5/5/ 5/5   DF 5/5/ 5/5         CMS Impairment/Limitation/Restriction for FOTO lumbar Survey    Therapist reviewed FOTO scores for Elicia Cavazos on 6/25/2019.   FOTO documents entered into [email or web address] - see Media section.    Limitation Score: 42%  Category: Mobility         TREATMENT   Treatment Time In: 1045a  Treatment Time Out: 1055a  Total Treatment time separate from Evaluation: 10 minutes    Mamta received therapeutic exercises to develop core stabilization for 10 minutes including:    Kegel's, 10x2s  TA setting, 20x2s  PPT, 20x2s  SKTC, 3x20s      Home Exercises and Patient Education Provided    Education provided:   - lumbar spine affecting bladder control    Written Home Exercises Provided: yes.  Exercises were reviewed and Mamta was able to demonstrate them prior to the end of the session.  Mamta demonstrated good  understanding of the education provided.     See EMR under Media for exercises provided 6/25/2019.    Assessment   Elicia is a 73 y.o. female referred to outpatient Physical Therapy with a medical diagnosis of acute B low back pain without sciatica, muscle spasm. Pt presents with signs and symptoms of nerve compression  in lower lumbar/sacral spine which is causing pain and possibly urinary incontinence. Pt demonstrates limited lumbar AROM, and there is no reversal of her hyperlordosis of the lumbar spine, which is likely due to the spondylolisthesis in the lumbar spine visualized via xray. Although pt will definitely benefit from a core stabilization program, she will also benefit from further consultation with a spine specialist due to red flag of nightly bladder incontinence associated with her recent increase and change in low back pain.     Pt prognosis is Fair.   Pt will benefit from skilled outpatient Physical Therapy to address the deficits stated above and in the chart below, provide pt/family education, and to maximize pt's level of independence.     Plan of care discussed with patient: Yes  Pt's spiritual, cultural and educational needs considered and patient is agreeable to the plan of care and goals as stated below:     Anticipated Barriers for therapy: red flags stated above    Medical Necessity is demonstrated by the following  History  Co-morbidities and personal factors that may impact the plan of care Co-morbidities:   advanced age, CAD, high BMI, HTN and tachycardia    Personal Factors:   age  lifestyle     moderate   Examination  Body Structures and Functions, activity limitations and participation restrictions that may impact the plan of care Body Regions:   back    Body Systems:    ROM  strength  motor control    Participation Restrictions:   ADLs    Activity limitations:   Learning and applying knowledge  no deficits    General Tasks and Commands  no deficits    Communication  no deficits    Mobility  lifting and carrying objects  walking    Self care  toileting    Domestic Life  doing house work (cleaning house, washing dishes, laundry)    Interactions/Relationships  no deficits    Life Areas  no deficits    Community and Social Life  community life  recreation and leisure         moderate   Clinical  Presentation evolving clinical presentation with changing clinical characteristics moderate   Decision Making/ Complexity Score: moderate     Goals:  Short Term Goals: 2 weeks   1. Pt will report no urinary incontinence associated with LBP.   2. Pt will be able to perform 15* lumbar side bending to improve ability to reach for household chores, dress.     Long Term Goals: 6 weeks   1. Pt will be able to perform 30 minutes of household chores without break due to low back pain.   2. Pt will score <30% limitation on FOTO lumbar survey.   3. Pt will be able to stand/walk for 10 minutes without low back pain or LE parasthesia.     Plan   Plan of care Certification: 6/25/2019 to 8/8/19.    Outpatient Physical Therapy 2 times weekly for 6 weeks to include the following interventions: Electrical Stimulation IFC for pain control, Gait Training, Manual Therapy, Moist Heat/ Ice, Neuromuscular Re-ed, Patient Education, Therapeutic Activites and Therapeutic Exercise. PTA may be involved in care for this patient under direction of PT.        Rashida Garcia, PT

## 2019-07-09 ENCOUNTER — CLINICAL SUPPORT (OUTPATIENT)
Dept: REHABILITATION | Facility: HOSPITAL | Age: 74
End: 2019-07-09
Attending: INTERNAL MEDICINE
Payer: MEDICARE

## 2019-07-09 DIAGNOSIS — M54.50 CHRONIC BILATERAL LOW BACK PAIN WITHOUT SCIATICA: ICD-10-CM

## 2019-07-09 DIAGNOSIS — G89.29 CHRONIC BILATERAL LOW BACK PAIN WITHOUT SCIATICA: ICD-10-CM

## 2019-07-09 PROCEDURE — 97110 THERAPEUTIC EXERCISES: CPT | Mod: HCNC,PO

## 2019-07-09 NOTE — PROGRESS NOTES
Physical Therapy Daily Treatment Note     Name: Elicia Cavazos  Clinic Number: [id number]    Therapy Diagnosis:   Encounter Diagnosis   Name Primary?    Chronic bilateral low back pain without sciatica      Physician: Brett Garcia MD    Visit Date: 7/9/2019  Physician Orders: PT Eval and Treat   Medical Diagnosis from Referral:   M54.5 (ICD-10-CM) - Acute bilateral low back pain without sciatica   S39.012A (ICD-10-CM) - Strain of lumbar region, initial encounter   M62.838 (ICD-10-CM) - Muscle spasm         Evaluation Date: 6/25/2019  Authorization Period Expiration: 12/31/19  Plan of Care Expiration: 8/8/19  Visit # / Visits authorized: 2/ 20    Time In: 1002 AM  Time Out: 1050 AM  Total Billable Time: 25 minutes    Precautions: Standard and tachycardia    Subjective     Pt reports: she had lower back pain when she woke up this morning but this resolved with a hot shower. Patient states her lower back is pain free currently. She was compliant with home exercise program.  Response to previous treatment: no adverse effect   Functional change: too soon to determine     Pain: 0/10  Location: bilateral lower back      Objective     Mamta received therapeutic exercises to develop strength, endurance, ROM, flexibility, posture and core stabilization for 48 minutes including:  LTR x 2 minutes  SKTC, 3x20s  Supine hip flexor stretch with strap x 30 sec x 3 (B)  Kegel's x 10, 2 second hold   TA setting, 20x2s  PPT, 20x2s  Bridging 2x10  Hip adduction ball squeeze x 2 minutes  SL clamshell 2x10  Prone on elbows x 2 minutes    Sit to stand 2x10  Standing hip abduction and extension 2x10 ea     Home Exercises Provided and Patient Education Provided     Education provided:   - lumbar spine affecting bladder control  - post treatment soreness.     Written Home Exercises Provided: Patient instructed to cont prior HEP.  Exercises were reviewed and Mamta was able to demonstrate them prior to the end of the session.  Mamta  demonstrated good  understanding of the education provided.     See EMR under Media for exercises provided 6/25/19.    Assessment     Mamta able to perform all exercises without provocation of lower back pain. Patient has tendency to hold breath when perform Kegel and TA set requiring cues to achieve correction. Training effect easily achieved with gluteal focused exercises. Patient able to perform sit to stand without UE assist or obvious loss of balance. Patient has moderate difficulty transitioning into prone positioning due to muscle weakness and decreased mobility but no complaints of pain with transitional movements.    Mamta is progressing well towards her goals.   Pt prognosis is Fair.     Pt will continue to benefit from skilled outpatient physical therapy to address the deficits listed in the problem list box on initial evaluation, provide pt/family education and to maximize pt's level of independence in the home and community environment.     Pt's spiritual, cultural and educational needs considered and pt agreeable to plan of care and goals.    Anticipated barriers to physical therapy: red flags stated above    Goals:   Short Term Goals: 2 weeks   1. Pt will report no urinary incontinence associated with LBP.   2. Pt will be able to perform 15* lumbar side bending to improve ability to reach for household chores, dress.      Long Term Goals: 6 weeks   1. Pt will be able to perform 30 minutes of household chores without break due to low back pain.   2. Pt will score <30% limitation on FOTO lumbar survey.   3. Pt will be able to stand/walk for 10 minutes without low back pain or LE parasthesia.     Plan     Continue current POC with emphasis on spinal and hip stability .    Avani Marin, PTA

## 2019-07-11 ENCOUNTER — CLINICAL SUPPORT (OUTPATIENT)
Dept: REHABILITATION | Facility: HOSPITAL | Age: 74
End: 2019-07-11
Attending: INTERNAL MEDICINE
Payer: MEDICARE

## 2019-07-11 DIAGNOSIS — M54.50 CHRONIC BILATERAL LOW BACK PAIN WITHOUT SCIATICA: ICD-10-CM

## 2019-07-11 DIAGNOSIS — G89.29 CHRONIC BILATERAL LOW BACK PAIN WITHOUT SCIATICA: ICD-10-CM

## 2019-07-11 PROCEDURE — 97110 THERAPEUTIC EXERCISES: CPT | Mod: HCNC,PO

## 2019-07-11 NOTE — PROGRESS NOTES
Physical Therapy Daily Treatment Note     Name: Elicia Cavazos  Clinic Number: [id number]    Therapy Diagnosis:   Encounter Diagnosis   Name Primary?    Chronic bilateral low back pain without sciatica      Physician: Brett Garcia MD    Visit Date: 7/11/2019  Physician Orders: PT Eval and Treat   Medical Diagnosis from Referral:   M54.5 (ICD-10-CM) - Acute bilateral low back pain without sciatica   S39.012A (ICD-10-CM) - Strain of lumbar region, initial encounter   M62.838 (ICD-10-CM) - Muscle spasm       Evaluation Date: 6/25/2019  Authorization Period Expiration: 12/31/19  Plan of Care Expiration: 8/8/19  Visit # / Visits authorized: 3/ 20    Time In: 1000 AM  Time Out: 1055 AM  Total Billable Time: 25 minutes    Precautions: Standard and tachycardia    Subjective     Pt reports: she had lower back soreness the day after our treatment session. Patient states she isn't feeling great this morning but this is because of stomach issues and not lower back. Patient states her lower back is pain free this morning due to taking a hot shower before coming to therapy. She was compliant with home exercise program.  Response to previous treatment: no adverse effect   Functional change: too soon to determine     Pain: 0/10  Location: bilateral lower back      Objective     Mamta received therapeutic exercises to develop strength, endurance, ROM, flexibility, posture and core stabilization for 55 minutes including:  LTR x 2 minutes  SKTC, 3x20s  Supine hip flexor stretch with strap x 30 sec x 3 (B)  Kegel's x 10, 2 second hold   TA setting, 20x2s  PPT, 20x2s  SLR x 10 (B)  Bridging 2x10  Hip adduction ball squeeze x 2 minutes  SL clamshell 3x10  Prone on elbows x 3 minutes    Sit to stand 2x10  Standing hip abduction and extension 2x10 ea   Standing rows (red TB) 2x10    Home Exercises Provided and Patient Education Provided     Education provided:   - lumbar spine affecting bladder control  - post treatment soreness.      Written Home Exercises Provided: Patient instructed to cont prior HEP.  Exercises were reviewed and Mamta was able to demonstrate them prior to the end of the session.  Mamta demonstrated good  understanding of the education provided.     See EMR under Media for exercises provided 6/25/19.    Assessment     Mamta able to perform all exercises without provocation of lower back pain. Patient able to perform standing rows without difficulty but cueing needed to for postural correction and upper trap compensation. Improved ability to perform bed mobility today. No difficulty with SLR but training effect easily achieved.   Mamta is progressing well towards her goals.   Pt prognosis is Fair.     Pt will continue to benefit from skilled outpatient physical therapy to address the deficits listed in the problem list box on initial evaluation, provide pt/family education and to maximize pt's level of independence in the home and community environment.     Pt's spiritual, cultural and educational needs considered and pt agreeable to plan of care and goals.    Anticipated barriers to physical therapy: red flags stated above    Goals:   Short Term Goals: 2 weeks   1. Pt will report no urinary incontinence associated with LBP.   2. Pt will be able to perform 15* lumbar side bending to improve ability to reach for household chores, dress.      Long Term Goals: 6 weeks   1. Pt will be able to perform 30 minutes of household chores without break due to low back pain.   2. Pt will score <30% limitation on FOTO lumbar survey.   3. Pt will be able to stand/walk for 10 minutes without low back pain or LE parasthesia.     Plan     Continue current POC with emphasis on spinal and hip stability .    Avani Marin, PTA

## 2019-07-16 ENCOUNTER — CLINICAL SUPPORT (OUTPATIENT)
Dept: REHABILITATION | Facility: HOSPITAL | Age: 74
End: 2019-07-16
Attending: INTERNAL MEDICINE
Payer: MEDICARE

## 2019-07-16 DIAGNOSIS — M54.50 CHRONIC BILATERAL LOW BACK PAIN WITHOUT SCIATICA: ICD-10-CM

## 2019-07-16 DIAGNOSIS — G89.29 CHRONIC BILATERAL LOW BACK PAIN WITHOUT SCIATICA: ICD-10-CM

## 2019-07-16 PROCEDURE — 97110 THERAPEUTIC EXERCISES: CPT | Mod: HCNC,PO

## 2019-07-16 NOTE — PROGRESS NOTES
Physical Therapy Daily Treatment Note     Name: Elicia Cavazos  Clinic Number: [id number]    Therapy Diagnosis:   Encounter Diagnosis   Name Primary?    Chronic bilateral low back pain without sciatica      Physician: Brett Garcia MD    Visit Date: 7/16/2019  Physician Orders: PT Eval and Treat   Medical Diagnosis from Referral:   M54.5 (ICD-10-CM) - Acute bilateral low back pain without sciatica   S39.012A (ICD-10-CM) - Strain of lumbar region, initial encounter   M62.838 (ICD-10-CM) - Muscle spasm       Evaluation Date: 6/25/2019  Authorization Period Expiration: 12/31/19  Plan of Care Expiration: 8/8/19  Visit # / Visits authorized: 4/ 20    Time In: 1000 AM  Time Out: 1055 AM  Total Billable Time: 55 minutes    Precautions: Standard and tachycardia    Subjective     Pt reports: her lower back is achy most mornings when she wakes up but a hot shower always resolves this soreness. Patient states she does feel sore the day after therapy. Patient states this soreness only lasts 1 day and it definitely feels like muscle soreness rather than pain. She was compliant with home exercise program.  Response to previous treatment: no adverse effect   Functional change: too soon to determine     Pain: 0/10  Location: bilateral lower back      Objective     Mamta received therapeutic exercises to develop strength, endurance, ROM, flexibility, posture and core stabilization for 55 minutes including:  LTR x 2 minutes  SKTC, 3x20s  Supine hip flexor stretch with strap x 30 sec x 3 (B)  TA setting, x 2 minutes, 5 sec hold   PPT, 20x2s  SLR 2 x 10 (B)  Bridging 2x10  Hip adduction ball squeeze x 2 minutes  SL clamshell 3x10 (Red TB)  Prone on elbows x 3 minutes    Sit to stand 2x10  Standing hip abduction and extension 2x10 ea   Standing rows (red TB) 2x10  Standing extensions (red TB) 2x10  Standing sidebend (in front of mirror for postural cueing) x 5 ea     Home Exercises Provided and Patient Education Provided     Education  provided:   - lumbar spine affecting bladder control  - post treatment soreness    Written Home Exercises Provided: Patient instructed to cont prior HEP.  Exercises were reviewed and Mamta was able to demonstrate them prior to the end of the session.  Mamta demonstrated good  understanding of the education provided.     See EMR under Media for exercises provided 6/25/19.    Assessment     Mamta tolerated treatment well without complaints of pain. Patient able to perform standing exercises without seated rest break indicating improved BLE and postural endurance. Patient able to perform L and R side bending today without complaints of lower back pain but cueing needed to ensure upright posture.   Mamta is progressing well towards her goals.   Pt prognosis is Fair.     Pt will continue to benefit from skilled outpatient physical therapy to address the deficits listed in the problem list box on initial evaluation, provide pt/family education and to maximize pt's level of independence in the home and community environment.     Pt's spiritual, cultural and educational needs considered and pt agreeable to plan of care and goals.    Anticipated barriers to physical therapy: red flags stated above    Goals:   Short Term Goals: 2 weeks   1. Pt will report no urinary incontinence associated with LBP.   2. Pt will be able to perform 15* lumbar side bending to improve ability to reach for household chores, dress.      Long Term Goals: 6 weeks   1. Pt will be able to perform 30 minutes of household chores without break due to low back pain.   2. Pt will score <30% limitation on FOTO lumbar survey.   3. Pt will be able to stand/walk for 10 minutes without low back pain or LE parasthesia.     Plan     Continue current POC with emphasis on spinal and hip stability .    Avani Marin, PTA

## 2019-07-18 ENCOUNTER — CLINICAL SUPPORT (OUTPATIENT)
Dept: REHABILITATION | Facility: HOSPITAL | Age: 74
End: 2019-07-18
Attending: INTERNAL MEDICINE
Payer: MEDICARE

## 2019-07-18 DIAGNOSIS — M54.50 CHRONIC BILATERAL LOW BACK PAIN WITHOUT SCIATICA: ICD-10-CM

## 2019-07-18 DIAGNOSIS — G89.29 CHRONIC BILATERAL LOW BACK PAIN WITHOUT SCIATICA: ICD-10-CM

## 2019-07-18 PROCEDURE — 97110 THERAPEUTIC EXERCISES: CPT | Mod: HCNC,PO

## 2019-07-18 NOTE — PROGRESS NOTES
Physical Therapy Daily Treatment Note     Name: Elicia Cavazos  Clinic Number: [id number]    Therapy Diagnosis:   Encounter Diagnosis   Name Primary?    Chronic bilateral low back pain without sciatica      Physician: Brett Garcia MD    Visit Date: 7/18/2019  Physician Orders: PT Eval and Treat   Medical Diagnosis from Referral:   M54.5 (ICD-10-CM) - Acute bilateral low back pain without sciatica   S39.012A (ICD-10-CM) - Strain of lumbar region, initial encounter   M62.838 (ICD-10-CM) - Muscle spasm       Evaluation Date: 6/25/2019  Authorization Period Expiration: 12/31/19  Plan of Care Expiration: 8/8/19  Visit # / Visits authorized: 5/ 20    Time In: 0958 AM  Time Out: 1055 AM  Total Billable Time: 57 minutes    Precautions: Standard and tachycardia    Subjective     Pt reports: her lower back continues to be painful when she wakes up but this pain has become less intense starting therapy. Patient states this pain always resolves with a hot shower. Patient states she went grocery shopping yesterday (about 30 minutes) and she didn't have any lower back pain.  She was compliant with home exercise program.  Response to previous treatment: no adverse effect   Functional change: decreased pain and moving better upon waking in AM    Pain: 0/10  Location: bilateral lower back      Objective     Mamta received therapeutic exercises to develop strength, endurance, ROM, flexibility, posture and core stabilization for 57 minutes including:  LTR x 2 minutes  SKTC, 3x20s  Supine hip flexor stretch with strap x 1 minute x 2 (B)  TA setting, x 2 minutes, 5 sec hold   PPT, 20x2s  SLR 2 x 10 (B)  Bridging 2x10  Hip adduction ball squeeze x 2 minutes  SL clamshell 3x10 (Red TB)  Prone on elbows x 3 minutes    Sit to stand 2x10  Standing hip abduction and extension 2x10 ea   Mini squat 1x10  Standing rows (red TB) 2x10  Standing extensions (red TB) 2x10  Standing sidebend (in front of mirror for postural cueing) x 5 ea   Step ups  (6 inch) using handrails x 10 (B)    Home Exercises Provided and Patient Education Provided     Education provided:   - lumbar spine affecting bladder control  - post treatment soreness    Written Home Exercises Provided: Patient instructed to cont prior HEP.  Exercises were reviewed and Mamta was able to demonstrate them prior to the end of the session.  Mamta demonstrated good  understanding of the education provided.     See EMR under Media for exercises provided 6/25/19.    Assessment     Mamta tolerated treatment well today. Patient able to perfrom mini squats without pain but heavy verbal and tactile cueing needed to promote posterior weight shift. Patient continues with forward flexed posture requiring constant cueing to promote upright posture. Patient challenged by step ups due to decreased confidence and cardiovascular endurance.   Mamta is progressing well towards her goals.   Pt prognosis is Fair.     Pt will continue to benefit from skilled outpatient physical therapy to address the deficits listed in the problem list box on initial evaluation, provide pt/family education and to maximize pt's level of independence in the home and community environment.     Pt's spiritual, cultural and educational needs considered and pt agreeable to plan of care and goals.    Anticipated barriers to physical therapy: red flags stated above    Goals:   Short Term Goals: 2 weeks   1. Pt will report no urinary incontinence associated with LBP.   2. Pt will be able to perform 15* lumbar side bending to improve ability to reach for household chores, dress.      Long Term Goals: 6 weeks   1. Pt will be able to perform 30 minutes of household chores without break due to low back pain.   2. Pt will score <30% limitation on FOTO lumbar survey.   3. Pt will be able to stand/walk for 10 minutes without low back pain or LE parasthesia.     Plan     Continue current POC with emphasis on spinal and hip stability .    Avani  Tania, PTA

## 2019-07-23 ENCOUNTER — CLINICAL SUPPORT (OUTPATIENT)
Dept: REHABILITATION | Facility: HOSPITAL | Age: 74
End: 2019-07-23
Attending: INTERNAL MEDICINE
Payer: MEDICARE

## 2019-07-23 DIAGNOSIS — M54.50 CHRONIC BILATERAL LOW BACK PAIN WITHOUT SCIATICA: ICD-10-CM

## 2019-07-23 DIAGNOSIS — G89.29 CHRONIC BILATERAL LOW BACK PAIN WITHOUT SCIATICA: ICD-10-CM

## 2019-07-23 PROCEDURE — 97110 THERAPEUTIC EXERCISES: CPT | Mod: HCNC,PO | Performed by: PHYSICAL THERAPIST

## 2019-07-23 NOTE — PROGRESS NOTES
Physical Therapy Daily Treatment Note     Name: Elicia Cavazos  Clinic Number: [id number]    Therapy Diagnosis:   Encounter Diagnosis   Name Primary?    Chronic bilateral low back pain without sciatica      Physician: Brett Garcia MD    Visit Date: 7/23/2019  Physician Orders: PT Eval and Treat   Medical Diagnosis from Referral:   M54.5 (ICD-10-CM) - Acute bilateral low back pain without sciatica   S39.012A (ICD-10-CM) - Strain of lumbar region, initial encounter   M62.838 (ICD-10-CM) - Muscle spasm       Evaluation Date: 6/25/2019  Authorization Period Expiration: 12/31/19  Plan of Care Expiration: 8/8/19  Visit # / Visits authorized: 6/ 20    Time In: 1001 AM  Time Out: 1105 AM  Total Billable Time: 40 minutes    Precautions: Standard and tachycardia    Subjective     Pt reports: her lower back was in a lot of pain for a day after last session, thinks it was due to new exercises of step ups and squats. Took tylenol that afternoon and felt better the next morning. States that she continues to feel better with hot showers in the morning.  She was compliant with home exercise program.  Response to previous treatment: no adverse effect   Functional change: decreased pain and moving better upon waking in AM    Pain: 0/10  Location: bilateral lower back      Objective     Mamta received therapeutic exercises to develop strength, endurance, ROM, flexibility, posture and core stabilization for 60 minutes including:  LTR x 2 minutes  SKTC, 3x20s  Supine hip flexor stretch with strap x 1 minute x 2 (B)  TA setting, x 2 minutes, 5 sec hold   PPT, 20x2s  SLR 2 x 10 (B)  Hip adduction ball squeeze x 2 minutes  SL clamshell 3x10 (Red TB)  Prone on elbows x 3 minutes    Sit to stand 2x10  Standing hip abduction and extension 2x10 ea   Standing rows (red TB) 2x10  Standing extensions (red TB) 2x10  Standing sidebend (in front of mirror for postural cueing) x 5 ea--NP   Palloff press, red TB, 10x each way    Step ups (6 inch)  using handrails x 10 (B)--NP  Mini squat 1x10--NP  Bridging 2x10--pt refused    Lumbar AROM 7/23/19:  Lumbar flex 75*, P!  Ext 20*  RSB 15*  LSB 15*    FOTO lumbar survey score: 39% (3% improvement)    Home Exercises Provided and Patient Education Provided     Education provided:   - importance of postural correction    Written Home Exercises Provided: Patient instructed to cont prior HEP.  Exercises were reviewed and Mamta was able to demonstrate them prior to the end of the session.  Mamta demonstrated good  understanding of the education provided.     See EMR under Media for exercises provided 6/25/19.    Assessment     Mamta tolerated treatment well today. Due to significant increase in pain after last session, held some exercises today. Pt will benefit from functional strengthening, but due to how this affected her pain we will need to add one new exercise at a time to allow acclimation and to build confidence.   Mamta is progressing well towards her goals.   Pt prognosis is Fair.     Pt will continue to benefit from skilled outpatient physical therapy to address the deficits listed in the problem list box on initial evaluation, provide pt/family education and to maximize pt's level of independence in the home and community environment.     Pt's spiritual, cultural and educational needs considered and pt agreeable to plan of care and goals.    Anticipated barriers to physical therapy: red flags stated above    Goals:   Short Term Goals: 2 weeks   1. Pt will report no urinary incontinence associated with LBP.   2. Pt will be able to perform 15* lumbar side bending to improve ability to reach for household chores, dress.      Long Term Goals: 6 weeks   1. Pt will be able to perform 30 minutes of household chores without break due to low back pain.   2. Pt will score <30% limitation on FOTO lumbar survey.   3. Pt will be able to stand/walk for 10 minutes without low back pain or LE parasthesia.     Plan      Continue current POC with emphasis on spinal and hip stability .    Rashida Garcia, PT

## 2019-07-25 ENCOUNTER — CLINICAL SUPPORT (OUTPATIENT)
Dept: REHABILITATION | Facility: HOSPITAL | Age: 74
End: 2019-07-25
Attending: INTERNAL MEDICINE
Payer: MEDICARE

## 2019-07-25 DIAGNOSIS — G89.29 CHRONIC BILATERAL LOW BACK PAIN WITHOUT SCIATICA: ICD-10-CM

## 2019-07-25 DIAGNOSIS — M54.50 CHRONIC BILATERAL LOW BACK PAIN WITHOUT SCIATICA: ICD-10-CM

## 2019-07-25 PROCEDURE — 97110 THERAPEUTIC EXERCISES: CPT | Mod: HCNC,PO

## 2019-07-25 NOTE — PROGRESS NOTES
Physical Therapy Daily Treatment Note     Name: Elicia Cavazos  Clinic Number: [id number]    Therapy Diagnosis:   Encounter Diagnosis   Name Primary?    Chronic bilateral low back pain without sciatica      Physician: Brett Garcia MD    Visit Date: 7/25/2019  Physician Orders: PT Eval and Treat   Medical Diagnosis from Referral:   M54.5 (ICD-10-CM) - Acute bilateral low back pain without sciatica   S39.012A (ICD-10-CM) - Strain of lumbar region, initial encounter   M62.838 (ICD-10-CM) - Muscle spasm       Evaluation Date: 6/25/2019  Authorization Period Expiration: 12/31/19  Plan of Care Expiration: 8/8/19  Visit # / Visits authorized: 7/ 20    Time In: 1001 AM  Time Out: 1050 AM  Total Billable Time: 30 minutes    Precautions: Standard and tachycardia    Subjective     Pt reports: soreness in her right glute following last treatment session but tylenol helped to resolve this. Patient reports minimal back pain this morning but none reported following hot shower. She was compliant with home exercise program.  Response to previous treatment: no adverse effect   Functional change: decreased pain and moving better upon waking in AM    Pain: 2/10 (upon waking) 0/10 currently   Location: bilateral lower back      Objective     Mamta received therapeutic exercises to develop strength, endurance, ROM, flexibility, posture and core stabilization for 45 minutes including:  LTR x 2 minutes  SKTC, 3x20s  Supine hip flexor stretch with strap x 1 minute x 2 (B)  TA setting, x 2 minutes, 5 sec hold   PPT, 20x2s  SLR 2 x 10 (B)  Bridging 2x10  Hip adduction ball squeeze x 2 minutes  SL clamshell 3x10 (Red TB)  Prone on elbows x 3 minutes    Sit to stand 2x10  Standing hip abduction and extension 2x10 ea   Standing rows (red TB) 2x10  Standing extensions (red TB) 2x10  Standing sidebend (in front of mirror for postural cueing) x 5 ea  Palloff press, red TB, 10x each way    Step ups (6 inch) using handrails x 10 (B)--NP    Home  Exercises Provided and Patient Education Provided     Education provided:   - importance of postural correction    Written Home Exercises Provided: Patient instructed to cont prior HEP.  Exercises were reviewed and Mamta was able to demonstrate them prior to the end of the session.  Mamta demonstrated good  understanding of the education provided.     See EMR under Media for exercises provided 6/25/19.    Assessment     Mamta tolerated treatment well today. Patient able to perform bridging today without complaints of lower back pain. Cueing provided to ensure core stability in standing and when performing dynamic stabilization exercises. Patient able to perform all exercises without complaints lower back or gluteal pain.   Mamta is progressing well towards her goals.   Pt prognosis is Fair.     Pt will continue to benefit from skilled outpatient physical therapy to address the deficits listed in the problem list box on initial evaluation, provide pt/family education and to maximize pt's level of independence in the home and community environment.     Pt's spiritual, cultural and educational needs considered and pt agreeable to plan of care and goals.    Anticipated barriers to physical therapy: red flags stated above    Goals:   Short Term Goals: 2 weeks   1. Pt will report no urinary incontinence associated with LBP.   2. Pt will be able to perform 15* lumbar side bending to improve ability to reach for household chores, dress.      Long Term Goals: 6 weeks   1. Pt will be able to perform 30 minutes of household chores without break due to low back pain.   2. Pt will score <30% limitation on FOTO lumbar survey.   3. Pt will be able to stand/walk for 10 minutes without low back pain or LE parasthesia.     Plan     Continue current POC with emphasis on spinal and hip stability .    Avani Marin, PTA

## 2019-07-26 RX ORDER — METOPROLOL SUCCINATE 50 MG/1
50 TABLET, EXTENDED RELEASE ORAL DAILY
Qty: 90 TABLET | Refills: 1 | Status: SHIPPED | OUTPATIENT
Start: 2019-07-26 | End: 2022-02-23

## 2019-07-26 NOTE — TELEPHONE ENCOUNTER
----- Message from Barry Rowland sent at 7/26/2019 10:04 AM CDT -----  Type:  RX Refill Request    Who Called:  Patient  Refill or New Rx:  Refill  RX Name and Strength:  metoprolol succinate (TOPROL-XL) 50 MG 24 hr tablet  How is the patient currently taking it? (ex. 1XDay):  1XDay  Is this a 30 day or 90 day RX: 90    Preferred Pharmacy with phone number:    [facility]/pharmacy #[id number] - [place] - [address] [facility]  [address] [address] [address]  Phone: [phone] Fax: [phone]      Local or Mail Order:  Local  Ordering Provider:  Radha Herrera Call Back Number: [phone]   Additional Information:  Patient states that she has 4 tablets left.    Please call to advise

## 2019-07-30 ENCOUNTER — CLINICAL SUPPORT (OUTPATIENT)
Dept: REHABILITATION | Facility: HOSPITAL | Age: 74
End: 2019-07-30
Attending: INTERNAL MEDICINE
Payer: MEDICARE

## 2019-07-30 DIAGNOSIS — M54.50 CHRONIC BILATERAL LOW BACK PAIN WITHOUT SCIATICA: ICD-10-CM

## 2019-07-30 DIAGNOSIS — G89.29 CHRONIC BILATERAL LOW BACK PAIN WITHOUT SCIATICA: ICD-10-CM

## 2019-07-30 PROCEDURE — 97140 MANUAL THERAPY 1/> REGIONS: CPT | Mod: HCNC,PO

## 2019-07-30 PROCEDURE — 97110 THERAPEUTIC EXERCISES: CPT | Mod: HCNC,PO

## 2019-07-30 NOTE — PROGRESS NOTES
Physical Therapy Daily Treatment Note     Name: Elicia Cavazos  Clinic Number: [id number]    Therapy Diagnosis:   Encounter Diagnosis   Name Primary?    Chronic bilateral low back pain without sciatica      Physician: Brett Garcia MD    Visit Date: 7/30/2019  Physician Orders: PT Eval and Treat   Medical Diagnosis from Referral:   M54.5 (ICD-10-CM) - Acute bilateral low back pain without sciatica   S39.012A (ICD-10-CM) - Strain of lumbar region, initial encounter   M62.838 (ICD-10-CM) - Muscle spasm       Evaluation Date: 6/25/2019  Authorization Period Expiration: 12/31/19  Plan of Care Expiration: 8/8/19  Visit # / Visits authorized: 8/ 20    Time In: 1005 AM  Time Out: 1100 AM  Total Billable Time: 55 minutes    Precautions: Standard and tachycardia    Subjective     Pt reports: the outside of her right hip and thigh are sore today. Patient states that everything just feels achy this morning. Patient states she does continue with urinary frequency especially at night. Patient states she does take a fluid pill every morning. She was compliant with home exercise program.  Response to previous treatment: no adverse effect   Functional change: decreased pain and moving better upon waking in AM    Pain: 5-6/10  Location: right lower back and hip      Objective     Mamta received therapeutic exercises to develop strength, endurance, ROM, flexibility, posture and core stabilization for 45 minutes including:  LTR x 2 minutes  SKTC, 3x20s  Supine figure 4 stretch 30 sec x 2 (pushing knee away) PTA assist   Supine hip flexor stretch with strap x 1 minute x 2 (B)  TA setting, x 2 minutes, 5 sec hold   PPT, 20x2s  SLR 2 x 10 (B)  Bridging 2x10  Hip adduction ball squeeze x 2 minutes  SL clamshell 3x10 (Red TB) (not performed today)  Prone on elbows x 3 minutes    Sit to stand 2x10  Standing hip abduction and extension 2x10 ea   Standing rows (red TB) 2x10  Standing extensions (red TB) 2x10  Palloff press, red TB, 10x each  way    Mamta received manual therapy techniques x 10 minutes:  IASTM with rolling stick to lateral glutes, IT band, hamstrings     Step ups (6 inch) using handrails x 10 (B)--NP    Home Exercises Provided and Patient Education Provided     Education provided:   - importance of postural correction    Written Home Exercises Provided: Patient instructed to cont prior HEP.  Exercises were reviewed and Mamta was able to demonstrate them prior to the end of the session.  Mamta demonstrated good  understanding of the education provided.     See EMR under Media for exercises provided 6/25/19.    Assessment     Mamta tolerated treatment well today despite incoming soreness. Patient did respond well to introduction of rolling stick today but sensitivity through R lateral glutes and distal IT band. Patient demonstrates decreased hip mobility bilaterally with stretching very easily achieved. Patient with decreased awareness of core activation/pelvic tilt coordination this visit requiring cueing for correction of holding breath.   Mamta is progressing well towards her goals.   Pt prognosis is Fair.     Pt will continue to benefit from skilled outpatient physical therapy to address the deficits listed in the problem list box on initial evaluation, provide pt/family education and to maximize pt's level of independence in the home and community environment.     Pt's spiritual, cultural and educational needs considered and pt agreeable to plan of care and goals.    Anticipated barriers to physical therapy: red flags stated above    Goals:   Short Term Goals: 2 weeks   1. Pt will report no urinary incontinence associated with LBP.   2. Pt will be able to perform 15* lumbar side bending to improve ability to reach for household chores, dress.      Long Term Goals: 6 weeks   1. Pt will be able to perform 30 minutes of household chores without break due to low back pain.   2. Pt will score <30% limitation on FOTO lumbar  survey.   3. Pt will be able to stand/walk for 10 minutes without low back pain or LE parasthesia.     Plan     Continue current POC with emphasis on spinal and hip stability .    Avani Marin, PTA

## 2019-08-01 ENCOUNTER — CLINICAL SUPPORT (OUTPATIENT)
Dept: REHABILITATION | Facility: HOSPITAL | Age: 74
End: 2019-08-01
Attending: INTERNAL MEDICINE
Payer: MEDICARE

## 2019-08-01 DIAGNOSIS — M54.50 CHRONIC BILATERAL LOW BACK PAIN WITHOUT SCIATICA: ICD-10-CM

## 2019-08-01 DIAGNOSIS — G89.29 CHRONIC BILATERAL LOW BACK PAIN WITHOUT SCIATICA: ICD-10-CM

## 2019-08-01 PROCEDURE — 97140 MANUAL THERAPY 1/> REGIONS: CPT | Mod: HCNC,PO

## 2019-08-01 PROCEDURE — 97110 THERAPEUTIC EXERCISES: CPT | Mod: HCNC,PO

## 2019-08-01 NOTE — PROGRESS NOTES
Physical Therapy Daily Treatment Note     Name: Elicia Cavazos  Clinic Number: [id number]    Therapy Diagnosis:   Encounter Diagnosis   Name Primary?    Chronic bilateral low back pain without sciatica      Physician: Brett Garcia MD    Visit Date: 8/1/2019  Physician Orders: PT Eval and Treat   Medical Diagnosis from Referral:   M54.5 (ICD-10-CM) - Acute bilateral low back pain without sciatica   S39.012A (ICD-10-CM) - Strain of lumbar region, initial encounter   M62.838 (ICD-10-CM) - Muscle spasm       Evaluation Date: 6/25/2019  Authorization Period Expiration: 12/31/19  Plan of Care Expiration: 8/8/19  Visit # / Visits authorized: 9/ 20    Time In: 0958 AM  Time Out: 1050 AM  Total Billable Time: 30 minutes    Precautions: Standard and tachycardia    Subjective     Pt reports: her lower back and hip are feeling much better this morning and she is currently pain free. Patient reports soreness from the rolling stick immediately following last treatment session but this resolved by that same night. She was compliant with home exercise program.  Response to previous treatment: no adverse effect   Functional change: decreased pain and moving better upon waking in AM    Pain: 0/10  Location: right lower back and hip      Objective     Mamta received therapeutic exercises to develop strength, endurance, ROM, flexibility, posture and core stabilization for 40 minutes including:  LTR x 2 minutes  SKTC, 3x20s  Supine figure 4 stretch 30 sec x 2 (pushing knee away) PTA assist   Supine hip flexor stretch with strap x 1 minute x 2 (B)  TA setting, x 2 minutes, 5 sec hold   PPT, 20x2s  SLR 2 x 10 (B)  Bridging 2x10  Hip adduction ball squeeze x 2 minutes  SL clamshell 3x10 (Red TB) (not performed today)  Prone on elbows x 3 minutes (not performed today)    Sit to stand 2x10  Standing hip abduction and extension 2x10 ea   Standing rows (red TB) 2x10  Standing extensions (red TB) 2x10  Palloff press, red TB, 10x each  "way    Mamta received manual therapy techniques x 10 minutes:  IASTM with rolling stick to lateral glutes, IT band, hamstrings     Home Exercises Provided and Patient Education Provided     Education provided:   - importance of postural correction    Written Home Exercises Provided: Patient instructed to cont prior HEP.  Exercises were reviewed and Mamta was able to demonstrate them prior to the end of the session.  Mamta demonstrated good  understanding of the education provided.     See EMR under Media for exercises provided 6/25/19.    Assessment     Mamta able to complete all exercises without complaints of back or right hip pain. Patient demonstrates much improved tolerance to IASTM with rolling stick and manual gluteal/hip stretching today. Mild sensitivity noted at distal IT band but patient states this is due to "varicose veins."   Mamta is progressing well towards her goals.   Pt prognosis is Fair.     Pt will continue to benefit from skilled outpatient physical therapy to address the deficits listed in the problem list box on initial evaluation, provide pt/family education and to maximize pt's level of independence in the home and community environment.     Pt's spiritual, cultural and educational needs considered and pt agreeable to plan of care and goals.    Anticipated barriers to physical therapy: red flags stated above    Goals:   Short Term Goals: 2 weeks   1. Pt will report no urinary incontinence associated with LBP.   2. Pt will be able to perform 15* lumbar side bending to improve ability to reach for household chores, dress.      Long Term Goals: 6 weeks   1. Pt will be able to perform 30 minutes of household chores without break due to low back pain.   2. Pt will score <30% limitation on FOTO lumbar survey.   3. Pt will be able to stand/walk for 10 minutes without low back pain or LE parasthesia.     Plan     Continue current POC with emphasis on spinal and hip stability .    Avani" Tania, PTA

## 2019-08-06 ENCOUNTER — CLINICAL SUPPORT (OUTPATIENT)
Dept: REHABILITATION | Facility: HOSPITAL | Age: 74
End: 2019-08-06
Attending: INTERNAL MEDICINE
Payer: MEDICARE

## 2019-08-06 DIAGNOSIS — G89.29 CHRONIC BILATERAL LOW BACK PAIN WITHOUT SCIATICA: ICD-10-CM

## 2019-08-06 DIAGNOSIS — M54.50 CHRONIC BILATERAL LOW BACK PAIN WITHOUT SCIATICA: ICD-10-CM

## 2019-08-06 PROCEDURE — 97110 THERAPEUTIC EXERCISES: CPT | Mod: HCNC,PO

## 2019-08-06 PROCEDURE — 97140 MANUAL THERAPY 1/> REGIONS: CPT | Mod: HCNC,PO

## 2019-08-06 NOTE — PROGRESS NOTES
Physical Therapy Daily Treatment Note     Name: Elicia Cavazos  Clinic Number: [id number]    Therapy Diagnosis:   Encounter Diagnosis   Name Primary?    Chronic bilateral low back pain without sciatica      Physician: Brett Garcia MD    Visit Date: 8/6/2019  Physician Orders: PT Eval and Treat   Medical Diagnosis from Referral:   M54.5 (ICD-10-CM) - Acute bilateral low back pain without sciatica   S39.012A (ICD-10-CM) - Strain of lumbar region, initial encounter   M62.838 (ICD-10-CM) - Muscle spasm       Evaluation Date: 6/25/2019  Authorization Period Expiration: 12/31/19  Plan of Care Expiration: 8/8/19  Visit # / Visits authorized: 10/ 20    Time In: 1002 AM  Time Out: 1100 AM  Total Billable Time: 58 minutes    Precautions: Standard and tachycardia    Subjective     Pt reports: she has no new complaints this monring. Patient reports lower back pain upon waking but this resolves completely with hot shower.  She was compliant with home exercise program.  Response to previous treatment: no adverse effect   Functional change: decreased pain and moving better upon waking in AM    Pain: 0/10  Location: right lower back and hip      Objective     Mamta received therapeutic exercises to develop strength, endurance, ROM, flexibility, posture and core stabilization for 45 minutes including:  LTR x 2 minutes  SKTC, 3x20s  Supine figure 4 stretch 30 sec x 2 (pushing knee away) PTA assist   Supine hip flexor stretch with strap x 1 minute x 2 (B)  Supine sciatic nerve glides (PTA providing light hamstring stretch, patient DF/PF foot x 10 reps x 2)   TA setting, x 2 minutes, 5 sec hold   PPT, 20x2s  SLR 2 x 10 (B)  Bridging 2x10  Hip adduction ball squeeze x 2 minutes  SL clamshell 3x10 (Red TB) (not performed today)  Prone on elbows x 3 minutes (not performed today)    Sit to stand 2x10  Standing hip abduction and extension 2x10 ea   Standing rows (green TB) 2x10  Standing extensions (green TB) 2x10  Palloff press, green  TB, 20x each way    Mamta received manual therapy techniques x 10 minutes:  IASTM with rolling stick to lateral glutes, IT band, hamstrings     Home Exercises Provided and Patient Education Provided     Education provided:   - importance of postural correction    Written Home Exercises Provided: yes, issued updated HEP with green theraband.  Exercises were reviewed and Mamta was able to demonstrate them prior to the end of the session.  Mamta demonstrated good  understanding of the education provided.     See EMR under Patient Instructions for exercises provided 8/6/19.    Assessment     Mamta able to complete therapeutic exercise without complaints of lower back and R hip pain. Patient able to progress to green theraband today with proper exercise form but cueing needed to achieve upright posture. Patient able to perform 10 minutes of standing activities today without seated rest break.   Mamta is progressing well towards her goals.   Pt prognosis is Fair.     Pt will continue to benefit from skilled outpatient physical therapy to address the deficits listed in the problem list box on initial evaluation, provide pt/family education and to maximize pt's level of independence in the home and community environment.     Pt's spiritual, cultural and educational needs considered and pt agreeable to plan of care and goals.    Anticipated barriers to physical therapy: red flags stated above    Goals:   Short Term Goals: 2 weeks   1. Pt will report no urinary incontinence associated with LBP.   2. Pt will be able to perform 15* lumbar side bending to improve ability to reach for household chores, dress.      Long Term Goals: 6 weeks   1. Pt will be able to perform 30 minutes of household chores without break due to low back pain.   2. Pt will score <30% limitation on FOTO lumbar survey.   3. Pt will be able to stand/walk for 10 minutes without low back pain or LE parasthesia.     Plan     Continue current POC with  emphasis on spinal and hip stability .    Avani Marin, PTA

## 2019-08-08 ENCOUNTER — CLINICAL SUPPORT (OUTPATIENT)
Dept: REHABILITATION | Facility: HOSPITAL | Age: 74
End: 2019-08-08
Attending: INTERNAL MEDICINE
Payer: MEDICARE

## 2019-08-08 DIAGNOSIS — M54.50 CHRONIC BILATERAL LOW BACK PAIN WITHOUT SCIATICA: ICD-10-CM

## 2019-08-08 DIAGNOSIS — G89.29 CHRONIC BILATERAL LOW BACK PAIN WITHOUT SCIATICA: ICD-10-CM

## 2019-08-08 PROCEDURE — 97110 THERAPEUTIC EXERCISES: CPT | Mod: HCNC,PO | Performed by: PHYSICAL THERAPIST

## 2019-08-08 NOTE — PROGRESS NOTES
Physical Therapy Daily Treatment Note     Name: Elicia Cavazos  Clinic Number: [id number]    Therapy Diagnosis:   Encounter Diagnosis   Name Primary?    Chronic bilateral low back pain without sciatica      Physician: Brett Garcia MD    Visit Date: 8/8/2019  Physician Orders: PT Eval and Treat   Medical Diagnosis from Referral:   M54.5 (ICD-10-CM) - Acute bilateral low back pain without sciatica   S39.012A (ICD-10-CM) - Strain of lumbar region, initial encounter   M62.838 (ICD-10-CM) - Muscle spasm       Evaluation Date: 6/25/2019  Authorization Period Expiration: 12/31/19  Plan of Care Expiration: 8/8/19  Visit # / Visits authorized: 11/ 20    Time In: 1008am (pt arrived 2 hours early for appt)  Time Out: 1040am  Total Billable Time: 32 minutes    Precautions: Standard and tachycardia    Subjective     Pt reports: she had increased pain in the entire back after last session. Took tylenol and felt better later. Took hot shower this morning and is feeling good now. Pt does not have as much pressure in her lower back at night in the middle of the night. Pt states that she has sorenss in the the R hip, but this has improved from the shooting pain she used to experience.   She was compliant with home exercise program.  Response to previous treatment: no adverse effect   Functional change: decreased pain and moving better upon waking in AM    Pain: 3/10  Location: right lower back and hip      Objective     Mamta received therapeutic exercises to develop strength, endurance, ROM, flexibility, posture and core stabilization for 45 minutes including:  LTR x 2 minutes  SKTC, 3x20s  Supine figure 4 stretch 30 sec x 2 (pushing knee away) PTA assist   Supine hip flexor stretch with strap x 1 minute x 2 (B)  Supine sciatic nerve glides (PTA providing light hamstring stretch, patient DF/PF foot x 10 reps x 2)   TA setting, x 2 minutes, 5 sec hold   PPT, 20x2s  SLR 2 x 10 (B)  Bridging 2x10  Hip adduction ball squeeze x 2  minutes  SL clamshell 3x10 (Red TB) (not performed today)  Prone on elbows x 3 minutes (not performed today)    Sit to stand 2x10  Standing hip abduction and extension 2x10 ea   Standing rows (green TB) 2x10  Standing extensions (green TB) 2x10  Palloff press, green TB, 20x each way    Mamta received manual therapy techniques x 10 minutes:  IASTM with rolling stick to lateral glutes, IT band, hamstrings     Lumbar AROM: Pain/Dysfunction with Movement:   Flexion 50 60 No reversal of lumbar lordosis   Extension 10 10     Right side bending 20 P!   Left side bending 20 p!   Right rotation mod loss ROM     Left rotation mod loss ROM        MMT RIGHT LEFT   Hip flex 4+/5 4+/5   Hip ext 4/5 4/5   Hip abd 4+/5 4+/5   Hip add 5/5 5/5   Knee flex 5/5 4+/5   Knee ext 5/5 5/5   DF 5/5 5/5          Home Exercises Provided and Patient Education Provided     Education provided:   - importance of postural correction    Written Home Exercises Provided: yes, issued updated HEP with green theraband.  Exercises were reviewed and Mamta was able to demonstrate them prior to the end of the session.  Mamta demonstrated good  understanding of the education provided.     See EMR under Patient Instructions for exercises provided 8/6/19.    Assessment     Mamta able to complete therapeutic exercise without complaints of lower back and R hip pain. Patient able to progress to green theraband today with proper exercise form but cueing needed to achieve upright posture. Patient able to perform 10 minutes of standing activities today without seated rest break.   Mamta is progressing well towards her goals.   Pt prognosis is Fair.     Pt will continue to benefit from skilled outpatient physical therapy to address the deficits listed in the problem list box on initial evaluation, provide pt/family education and to maximize pt's level of independence in the home and community environment.     Pt's spiritual, cultural and educational needs  considered and pt agreeable to plan of care and goals.    Anticipated barriers to physical therapy: red flags stated above    Goals:   Short Term Goals: 2 weeks   1. Pt will report no urinary incontinence associated with LBP. Met  2. Pt will be able to perform 15* lumbar side bending to improve ability to reach for household chores, dress. MET     Long Term Goals: 6 weeks   1. Pt will be able to perform 30 minutes of household chores without break due to low back pain. Met  2. Pt will score <30% limitation on FOTO lumbar survey. Not met.  3. Pt will be able to stand/walk for 10 minutes without low back pain or LE parasthesia. Met     Plan     Due to reaching max potential at this time, pt will be discharged to [facility].     Rashida Garcia, PT

## 2019-08-12 PROBLEM — G89.29 CHRONIC BILATERAL LOW BACK PAIN WITHOUT SCIATICA: Status: RESOLVED | Noted: 2019-06-25 | Resolved: 2019-08-12

## 2019-08-12 PROBLEM — M54.50 CHRONIC BILATERAL LOW BACK PAIN WITHOUT SCIATICA: Status: RESOLVED | Noted: 2019-06-25 | Resolved: 2019-08-12

## 2019-08-27 ENCOUNTER — PATIENT OUTREACH (OUTPATIENT)
Dept: ADMINISTRATIVE | Facility: HOSPITAL | Age: 74
End: 2019-08-27

## 2019-09-10 ENCOUNTER — OFFICE VISIT (OUTPATIENT)
Dept: FAMILY MEDICINE | Facility: CLINIC | Age: 74
End: 2019-09-10
Payer: MEDICARE

## 2019-09-10 VITALS
SYSTOLIC BLOOD PRESSURE: 128 MMHG | DIASTOLIC BLOOD PRESSURE: 60 MMHG | HEART RATE: 59 BPM | OXYGEN SATURATION: 97 % | WEIGHT: 259.56 LBS | TEMPERATURE: 98 F | HEIGHT: 65 IN | BODY MASS INDEX: 43.24 KG/M2

## 2019-09-10 DIAGNOSIS — E78.2 MIXED HYPERLIPIDEMIA: ICD-10-CM

## 2019-09-10 DIAGNOSIS — I10 BENIGN ESSENTIAL HTN: ICD-10-CM

## 2019-09-10 DIAGNOSIS — R73.02 IMPAIRED GLUCOSE TOLERANCE: ICD-10-CM

## 2019-09-10 DIAGNOSIS — M54.50 CHRONIC MIDLINE LOW BACK PAIN WITHOUT SCIATICA: Primary | ICD-10-CM

## 2019-09-10 DIAGNOSIS — I25.10 CORONARY ARTERY DISEASE INVOLVING NATIVE CORONARY ARTERY OF NATIVE HEART WITHOUT ANGINA PECTORIS: ICD-10-CM

## 2019-09-10 DIAGNOSIS — D64.9 NORMOCYTIC ANEMIA: ICD-10-CM

## 2019-09-10 DIAGNOSIS — M43.10 ANTEROLISTHESIS: ICD-10-CM

## 2019-09-10 DIAGNOSIS — Z78.9 STATIN INTOLERANCE: ICD-10-CM

## 2019-09-10 DIAGNOSIS — M51.37 DDD (DEGENERATIVE DISC DISEASE), LUMBOSACRAL: ICD-10-CM

## 2019-09-10 DIAGNOSIS — M47.817 FACET ARTHROPATHY, LUMBOSACRAL: ICD-10-CM

## 2019-09-10 DIAGNOSIS — G89.29 CHRONIC MIDLINE LOW BACK PAIN WITHOUT SCIATICA: Primary | ICD-10-CM

## 2019-09-10 PROCEDURE — 99499 UNLISTED E&M SERVICE: CPT | Mod: HCNC,S$GLB,, | Performed by: INTERNAL MEDICINE

## 2019-09-10 PROCEDURE — 99999 PR PBB SHADOW E&M-EST. PATIENT-LVL III: ICD-10-PCS | Mod: PBBFAC,HCNC,, | Performed by: INTERNAL MEDICINE

## 2019-09-10 PROCEDURE — 3074F PR MOST RECENT SYSTOLIC BLOOD PRESSURE < 130 MM HG: ICD-10-PCS | Mod: HCNC,CPTII,S$GLB, | Performed by: INTERNAL MEDICINE

## 2019-09-10 PROCEDURE — 99214 PR OFFICE/OUTPT VISIT, EST, LEVL IV, 30-39 MIN: ICD-10-PCS | Mod: HCNC,S$GLB,, | Performed by: INTERNAL MEDICINE

## 2019-09-10 PROCEDURE — 99499 RISK ADDL DX/OHS AUDIT: ICD-10-PCS | Mod: HCNC,S$GLB,, | Performed by: INTERNAL MEDICINE

## 2019-09-10 PROCEDURE — 3078F PR MOST RECENT DIASTOLIC BLOOD PRESSURE < 80 MM HG: ICD-10-PCS | Mod: HCNC,CPTII,S$GLB, | Performed by: INTERNAL MEDICINE

## 2019-09-10 PROCEDURE — 99999 PR PBB SHADOW E&M-EST. PATIENT-LVL III: CPT | Mod: PBBFAC,HCNC,, | Performed by: INTERNAL MEDICINE

## 2019-09-10 PROCEDURE — 1101F PT FALLS ASSESS-DOCD LE1/YR: CPT | Mod: HCNC,CPTII,S$GLB, | Performed by: INTERNAL MEDICINE

## 2019-09-10 PROCEDURE — 99214 OFFICE O/P EST MOD 30 MIN: CPT | Mod: HCNC,S$GLB,, | Performed by: INTERNAL MEDICINE

## 2019-09-10 PROCEDURE — 3078F DIAST BP <80 MM HG: CPT | Mod: HCNC,CPTII,S$GLB, | Performed by: INTERNAL MEDICINE

## 2019-09-10 PROCEDURE — 3074F SYST BP LT 130 MM HG: CPT | Mod: HCNC,CPTII,S$GLB, | Performed by: INTERNAL MEDICINE

## 2019-09-10 PROCEDURE — 1101F PR PT FALLS ASSESS DOC 0-1 FALLS W/OUT INJ PAST YR: ICD-10-PCS | Mod: HCNC,CPTII,S$GLB, | Performed by: INTERNAL MEDICINE

## 2019-09-10 NOTE — PROGRESS NOTES
"Subjective:       Patient ID: Elicia Cavazos is a 74 y.o. female.    Chief Complaint: Follow-up    HPI  Here today for reassessment of her LBP; without sciatica; sent to PT for eval/tx; going 2x a week for 6-8 weeks; has finished; helped a lot; LBP a lot better now. On 1-10 a 2 for pain; prior was 8-9 level. Baclofen used occasionally; mostly tylenol; as XS. No heavy lifting.     Review of Systems   Constitutional: Negative for appetite change, fever and unexpected weight change.   HENT: Negative for congestion, postnasal drip and rhinorrhea.    Respiratory: Negative for cough, chest tightness, shortness of breath and wheezing.    Cardiovascular: Negative for chest pain, palpitations and leg swelling.   Gastrointestinal: Negative for abdominal pain, blood in stool, constipation, diarrhea, nausea and vomiting.   Genitourinary: Negative for dysuria and hematuria.   Musculoskeletal: Positive for arthralgias and myalgias.        Lower back pain. Doing a lot better.    Neurological: Negative for syncope, weakness and numbness.   Psychiatric/Behavioral: Negative for dysphoric mood. The patient is not nervous/anxious.        Objective:      Vitals:    09/10/19 0958   BP: 128/60   BP Location: Left arm   Patient Position: Sitting   BP Method: Large (Manual)   Pulse: (!) 59   Temp: 97.6 °F (36.4 °C)   TempSrc: Oral   SpO2: 97%   Weight: 117.8 kg (259 lb 9.5 oz)   Height: 5' 5" (1.651 m)     Body mass index is 43.2 kg/m².    Physical Exam   Constitutional: She is oriented to person, place, and time. She appears well-developed and well-nourished.   HENT:   Head: Normocephalic and atraumatic.   Eyes: EOM are normal.   Neck: Normal range of motion. Neck supple. No thyromegaly present.   Cardiovascular: Normal rate, regular rhythm and normal heart sounds. Exam reveals no gallop.   No murmur heard.  Pulmonary/Chest: Effort normal and breath sounds normal. No respiratory distress. She has no wheezes. She has no rales.   Abdominal: " Soft. Bowel sounds are normal. She exhibits no distension. There is no tenderness. There is no rebound and no guarding.   Musculoskeletal: Normal range of motion. She exhibits edema.   No L-S sp tenderness to palp. Nl ROm hips and nontender; SLR neg jony; MS 5/5 Le's. 3+ LLE w 2-3+ RLE edema; no calf tenderness to palp; spider veins.    Lymphadenopathy:     She has no cervical adenopathy.   Neurological: She is alert and oriented to person, place, and time.   Moves all 4 extremities fine.   Skin: No rash noted.   Psychiatric: She has a normal mood and affect. Her behavior is normal. Thought content normal.   Vitals reviewed.      Assessment:       1. Chronic midline low back pain without sciatica    2. DDD (degenerative disc disease), lumbosacral    3. Anterolisthesis    4. Facet arthropathy, lumbosacral    5. Benign essential HTN    6. Mixed hyperlipidemia    7. Statin intolerance    8. Coronary artery disease involving native coronary artery of native heart without angina pectoris    9. Impaired glucose tolerance    10. Normocytic anemia        Plan:       Chronic midline low back pain without sciatica; doing a lot better w PT. Tylenol and baclofen for pain. Thermal heat applications as needed. Cont back exercises.    DDD (degenerative disc disease), lumbosacral    Anterolisthesis of L4-5 and L5-S1.     Facet arthropathy, lumbosacral    Benign essential HTN.Maintain < 2 Gm Na a day diet, and monitor BP at home; keep a log. Same treatment.  -     CBC auto differential; Future; Expected date: 09/10/2019  -     Comprehensive metabolic panel; Future; Expected date: 09/10/2019  -     Hemoglobin A1c; Future; Expected date: 09/10/2019  -     Lipid panel; Future; Expected date: 09/10/2019    Mixed hyperlipidemia.Maintain low fat high fiber diet, exercise regularly. Weight reduction where indicated. On lovastatin and zetia.   -     Comprehensive metabolic panel; Future; Expected date: 09/10/2019  -     Lipid panel; Future;  Expected date: 09/10/2019    Statin intolerance; but tolerates lovastatin.     Coronary artery disease involving native coronary artery of native heart without angina pectoris; has been stable; sees Dr Rachel this week.     Impaired glucose tolerance.Exercise recommended with weight reduction and low carb diet; we'll follow hemoglobin A1c's with you periodically.  -     Comprehensive metabolic panel; Future; Expected date: 09/10/2019  -     Hemoglobin A1c; Future; Expected date: 09/10/2019    Normocytic anemia; on women's 50+ MVI daily.   -     CBC auto differential; Future; Expected date: 09/10/2019

## 2019-09-10 NOTE — PATIENT INSTRUCTIONS
Chronic midline low back pain without sciatica; doing a lot better w PT. Tylenol and baclofen for pain. Thermal heat applications as needed.     DDD (degenerative disc disease), lumbosacral    Anterolisthesis of L4-5 and L5-S1.     Facet arthropathy, lumbosacral    Benign essential HTN.Maintain < 2 Gm Na a day diet, and monitor BP at home; keep a log. Same treatment.  -     CBC auto differential; Future; Expected date: 09/10/2019  -     Comprehensive metabolic panel; Future; Expected date: 09/10/2019  -     Hemoglobin A1c; Future; Expected date: 09/10/2019  -     Lipid panel; Future; Expected date: 09/10/2019    Mixed hyperlipidemia.Maintain low fat high fiber diet, exercise regularly. Weight reduction where indicated. Onzetia.   -     Comprehensive metabolic panel; Future; Expected date: 09/10/2019  -     Lipid panel; Future; Expected date: 09/10/2019    Statin intolerance    Coronary artery disease involving native coronary artery of native heart without angina pectoris; has been stable; sees Dr Rachel this week.     Impaired glucose tolerance.Exercise recommended with weight reduction and low carb diet; we'll follow hemoglobin A1c's with you periodically.  -     Comprehensive metabolic panel; Future; Expected date: 09/10/2019  -     Hemoglobin A1c; Future; Expected date: 09/10/2019    Normocytic anemia; on women's 50+ MVI daily.   -     CBC auto differential; Future; Expected date: 09/10/2019

## 2019-11-19 ENCOUNTER — HOSPITAL ENCOUNTER (OUTPATIENT)
Dept: RADIOLOGY | Facility: HOSPITAL | Age: 74
Discharge: HOME | End: 2019-11-19
Attending: SURGERY
Payer: MEDICARE

## 2019-11-19 DIAGNOSIS — Z12.31 ENCOUNTER FOR SCREENING MAMMOGRAM FOR MALIGNANT NEOPLASM OF BREAST: ICD-10-CM

## 2019-11-19 PROCEDURE — 77067 SCR MAMMO BI INCL CAD: CPT | Mod: TC,HCNC,PO

## 2019-11-19 PROCEDURE — 77067 SCR MAMMO BI INCL CAD: CPT | Mod: 26,HCNC,, | Performed by: RADIOLOGY

## 2019-11-19 PROCEDURE — 77063 BREAST TOMOSYNTHESIS BI: CPT | Mod: 26,HCNC,, | Performed by: RADIOLOGY

## 2019-11-19 PROCEDURE — 77063 MAMMO DIGITAL SCREENING BILAT WITH TOMOSYNTHESIS_CAD: ICD-10-PCS | Mod: 26,HCNC,, | Performed by: RADIOLOGY

## 2019-11-19 PROCEDURE — 77067 MAMMO DIGITAL SCREENING BILAT WITH TOMOSYNTHESIS_CAD: ICD-10-PCS | Mod: 26,HCNC,, | Performed by: RADIOLOGY

## 2019-11-25 DIAGNOSIS — Z91.89 AT RISK FOR ADVERSE DRUG EVENT: ICD-10-CM

## 2019-11-25 DIAGNOSIS — I10 BENIGN ESSENTIAL HTN: ICD-10-CM

## 2019-11-28 RX ORDER — TELMISARTAN 40 MG/1
TABLET ORAL
Qty: 90 TABLET | Refills: 1 | Status: SHIPPED | OUTPATIENT
Start: 2019-11-28 | End: 2020-03-17 | Stop reason: SDUPTHER

## 2019-12-04 ENCOUNTER — LAB VISIT (OUTPATIENT)
Dept: LAB | Facility: HOSPITAL | Age: 74
End: 2019-12-04
Attending: INTERNAL MEDICINE
Payer: MEDICARE

## 2019-12-04 DIAGNOSIS — I10 UNCONTROLLED HYPERTENSION: ICD-10-CM

## 2019-12-04 DIAGNOSIS — I47.10 PSVT (PAROXYSMAL SUPRAVENTRICULAR TACHYCARDIA): ICD-10-CM

## 2019-12-04 DIAGNOSIS — E66.01 MORBID OBESITY: ICD-10-CM

## 2019-12-04 DIAGNOSIS — R73.02 IMPAIRED GLUCOSE TOLERANCE: ICD-10-CM

## 2019-12-04 DIAGNOSIS — R60.0 BILATERAL LEG EDEMA: ICD-10-CM

## 2019-12-04 DIAGNOSIS — E78.00 PURE HYPERCHOLESTEROLEMIA: ICD-10-CM

## 2019-12-04 LAB
ALBUMIN SERPL BCP-MCNC: 3.7 G/DL (ref 3.5–5.2)
ALP SERPL-CCNC: 100 U/L (ref 55–135)
ALT SERPL W/O P-5'-P-CCNC: 11 U/L (ref 10–44)
ANION GAP SERPL CALC-SCNC: 9 MMOL/L (ref 8–16)
AST SERPL-CCNC: 15 U/L (ref 10–40)
BILIRUB SERPL-MCNC: 0.5 MG/DL (ref 0.1–1)
BNP SERPL-MCNC: 103 PG/ML (ref 0–99)
BUN SERPL-MCNC: 24 MG/DL (ref 8–23)
CALCIUM SERPL-MCNC: 9.7 MG/DL (ref 8.7–10.5)
CHLORIDE SERPL-SCNC: 105 MMOL/L (ref 95–110)
CHOLEST SERPL-MCNC: 226 MG/DL (ref 120–199)
CHOLEST/HDLC SERPL: 4.8 {RATIO} (ref 2–5)
CO2 SERPL-SCNC: 27 MMOL/L (ref 23–29)
CREAT SERPL-MCNC: 0.7 MG/DL (ref 0.5–1.4)
EST. GFR  (AFRICAN AMERICAN): >60 ML/MIN/1.73 M^2
EST. GFR  (NON AFRICAN AMERICAN): >60 ML/MIN/1.73 M^2
ESTIMATED AVG GLUCOSE: 108 MG/DL (ref 68–131)
GLUCOSE SERPL-MCNC: 103 MG/DL (ref 70–110)
HBA1C MFR BLD HPLC: 5.4 % (ref 4–5.6)
HDLC SERPL-MCNC: 47 MG/DL (ref 40–75)
HDLC SERPL: 20.8 % (ref 20–50)
LDLC SERPL CALC-MCNC: 157.8 MG/DL (ref 63–159)
MAGNESIUM SERPL-MCNC: 2 MG/DL (ref 1.6–2.6)
NONHDLC SERPL-MCNC: 179 MG/DL
POTASSIUM SERPL-SCNC: 4.6 MMOL/L (ref 3.5–5.1)
PROT SERPL-MCNC: 7.5 G/DL (ref 6–8.4)
SODIUM SERPL-SCNC: 141 MMOL/L (ref 136–145)
TRIGL SERPL-MCNC: 106 MG/DL (ref 30–150)

## 2019-12-04 PROCEDURE — 83036 HEMOGLOBIN GLYCOSYLATED A1C: CPT | Mod: HCNC

## 2019-12-04 PROCEDURE — 80061 LIPID PANEL: CPT | Mod: HCNC

## 2019-12-04 PROCEDURE — 83735 ASSAY OF MAGNESIUM: CPT | Mod: HCNC

## 2019-12-04 PROCEDURE — 83880 ASSAY OF NATRIURETIC PEPTIDE: CPT | Mod: HCNC

## 2019-12-04 PROCEDURE — 80053 COMPREHEN METABOLIC PANEL: CPT | Mod: HCNC

## 2019-12-04 PROCEDURE — 36415 COLL VENOUS BLD VENIPUNCTURE: CPT | Mod: HCNC,PO

## 2019-12-09 DIAGNOSIS — I25.10 CORONARY ARTERY DISEASE INVOLVING NATIVE CORONARY ARTERY OF NATIVE HEART WITHOUT ANGINA PECTORIS: ICD-10-CM

## 2019-12-09 DIAGNOSIS — I10 BENIGN ESSENTIAL HTN: ICD-10-CM

## 2019-12-09 DIAGNOSIS — E78.00 PURE HYPERCHOLESTEROLEMIA: ICD-10-CM

## 2019-12-09 RX ORDER — LOVASTATIN 40 MG/1
TABLET ORAL
Qty: 180 TABLET | Refills: 1 | Status: SHIPPED | OUTPATIENT
Start: 2019-12-09 | End: 2021-07-27

## 2019-12-12 ENCOUNTER — OFFICE VISIT (OUTPATIENT)
Dept: FAMILY MEDICINE | Facility: CLINIC | Age: 74
End: 2019-12-12
Payer: MEDICARE

## 2019-12-12 VITALS
DIASTOLIC BLOOD PRESSURE: 74 MMHG | SYSTOLIC BLOOD PRESSURE: 136 MMHG | WEIGHT: 264.88 LBS | BODY MASS INDEX: 44.13 KG/M2 | HEART RATE: 57 BPM | HEIGHT: 65 IN | TEMPERATURE: 98 F | OXYGEN SATURATION: 98 %

## 2019-12-12 DIAGNOSIS — I25.10 CORONARY ARTERY DISEASE INVOLVING NATIVE CORONARY ARTERY OF NATIVE HEART WITHOUT ANGINA PECTORIS: ICD-10-CM

## 2019-12-12 DIAGNOSIS — Z12.39 BREAST CANCER SCREENING: ICD-10-CM

## 2019-12-12 DIAGNOSIS — M54.41 CHRONIC MIDLINE LOW BACK PAIN WITH RIGHT-SIDED SCIATICA: ICD-10-CM

## 2019-12-12 DIAGNOSIS — I34.0 MITRAL VALVE INSUFFICIENCY, UNSPECIFIED ETIOLOGY: ICD-10-CM

## 2019-12-12 DIAGNOSIS — E66.01 MORBID OBESITY: ICD-10-CM

## 2019-12-12 DIAGNOSIS — E78.49 OTHER HYPERLIPIDEMIA: ICD-10-CM

## 2019-12-12 DIAGNOSIS — Z78.9 STATIN INTOLERANCE: ICD-10-CM

## 2019-12-12 DIAGNOSIS — G89.29 CHRONIC MIDLINE LOW BACK PAIN WITH RIGHT-SIDED SCIATICA: ICD-10-CM

## 2019-12-12 DIAGNOSIS — Z86.79 HISTORY OF PSVT (PAROXYSMAL SUPRAVENTRICULAR TACHYCARDIA): ICD-10-CM

## 2019-12-12 DIAGNOSIS — I35.0 AORTIC VALVE STENOSIS, ETIOLOGY OF CARDIAC VALVE DISEASE UNSPECIFIED: ICD-10-CM

## 2019-12-12 DIAGNOSIS — D64.9 NORMOCYTIC ANEMIA: ICD-10-CM

## 2019-12-12 DIAGNOSIS — R73.02 IMPAIRED GLUCOSE TOLERANCE: ICD-10-CM

## 2019-12-12 DIAGNOSIS — E55.9 VITAMIN D DEFICIENCY: ICD-10-CM

## 2019-12-12 DIAGNOSIS — I10 BENIGN ESSENTIAL HTN: Primary | ICD-10-CM

## 2019-12-12 PROCEDURE — 99214 OFFICE O/P EST MOD 30 MIN: CPT | Mod: HCNC,S$GLB,, | Performed by: INTERNAL MEDICINE

## 2019-12-12 PROCEDURE — 99214 PR OFFICE/OUTPT VISIT, EST, LEVL IV, 30-39 MIN: ICD-10-PCS | Mod: HCNC,S$GLB,, | Performed by: INTERNAL MEDICINE

## 2019-12-12 PROCEDURE — 99999 PR PBB SHADOW E&M-EST. PATIENT-LVL III: ICD-10-PCS | Mod: PBBFAC,HCNC,, | Performed by: INTERNAL MEDICINE

## 2019-12-12 PROCEDURE — 3078F DIAST BP <80 MM HG: CPT | Mod: HCNC,CPTII,S$GLB, | Performed by: INTERNAL MEDICINE

## 2019-12-12 PROCEDURE — 3078F PR MOST RECENT DIASTOLIC BLOOD PRESSURE < 80 MM HG: ICD-10-PCS | Mod: HCNC,CPTII,S$GLB, | Performed by: INTERNAL MEDICINE

## 2019-12-12 PROCEDURE — 1101F PR PT FALLS ASSESS DOC 0-1 FALLS W/OUT INJ PAST YR: ICD-10-PCS | Mod: HCNC,CPTII,S$GLB, | Performed by: INTERNAL MEDICINE

## 2019-12-12 PROCEDURE — 99499 RISK ADDL DX/OHS AUDIT: ICD-10-PCS | Mod: S$GLB,,, | Performed by: INTERNAL MEDICINE

## 2019-12-12 PROCEDURE — 1101F PT FALLS ASSESS-DOCD LE1/YR: CPT | Mod: HCNC,CPTII,S$GLB, | Performed by: INTERNAL MEDICINE

## 2019-12-12 PROCEDURE — 3075F PR MOST RECENT SYSTOLIC BLOOD PRESS GE 130-139MM HG: ICD-10-PCS | Mod: HCNC,CPTII,S$GLB, | Performed by: INTERNAL MEDICINE

## 2019-12-12 PROCEDURE — 3075F SYST BP GE 130 - 139MM HG: CPT | Mod: HCNC,CPTII,S$GLB, | Performed by: INTERNAL MEDICINE

## 2019-12-12 PROCEDURE — 1159F MED LIST DOCD IN RCRD: CPT | Mod: HCNC,S$GLB,, | Performed by: INTERNAL MEDICINE

## 2019-12-12 PROCEDURE — 99499 UNLISTED E&M SERVICE: CPT | Mod: S$GLB,,, | Performed by: INTERNAL MEDICINE

## 2019-12-12 PROCEDURE — 1159F PR MEDICATION LIST DOCUMENTED IN MEDICAL RECORD: ICD-10-PCS | Mod: HCNC,S$GLB,, | Performed by: INTERNAL MEDICINE

## 2019-12-12 PROCEDURE — 99999 PR PBB SHADOW E&M-EST. PATIENT-LVL III: CPT | Mod: PBBFAC,HCNC,, | Performed by: INTERNAL MEDICINE

## 2019-12-12 RX ORDER — EZETIMIBE 10 MG/1
10 TABLET ORAL DAILY
Qty: 90 TABLET | Refills: 1 | Status: SHIPPED | OUTPATIENT
Start: 2019-12-12 | End: 2020-03-17

## 2019-12-12 NOTE — PROGRESS NOTES
Subjective:       Patient ID: Elicia Cavazos is a 74 y.o. female.    Chief Complaint: Follow-up    HPI  Here for reassessment and to go over labs.      Breast cancer screen: recent mammogram 11/19/19 w no radiographic evidence of malignancy noted; routine screen in 1 yr; Dr Olmedo managing her mammograms; keep f/u's w her as directed; her NP does her breast exams; Gyn Dr Snowden does her pelvics and paps.      Chr LBP; right sciatica; doing a lot better; has been going to PT for 6 weeks and finished and helped a lot.      Essential hypertension:  Watches her salt intake; blood pressure here manually is 136/74; BP at home is 135/65 average.  Has been advised to watch her salt a little closer as well as to include regular exercise and weekly routine.  And to sit for several minutes before taking her blood pressure reading at home.  Patient's blood pressure cuff at home is HD she needs a new biceps cuff     Other hyperlipidemia:  She is on low-fat high-fiber diet or at least tries; tolerates lovastatin fine; total cholesterol is 226, triglyceride 106, HDL 47, .8; history of statin intolerance on lovastatin 40 mg b.i.d..  Will add Zetia 10 mg daily to improve her LDL control with goal less than 70.     Coronary artery disease:  No complaints of chest pain, shortness breath, or palpitations.  Her cardiologist is Dr. Rachel.       02/18/2019 echo with LV ejection fraction 55% and normal LV diastolic function.  Moderate left atrial increase; mild-to-moderate aortic valve stenosis with area 1.56 sq cm and a gradient of 12.  PAS 37; CVP 3; mild MR and mild TR.     Impaired glucose tolerance:  She does watch her carbohydrate intake; stressed the need for regular exercise and caloric restriction to help her weight come down and improve her glucose control; BMI is elevated at 44.08 or in morbid obesity category.  She has been trying to exercise and eating small portions.  Fasting blood sugar 103 with hemoglobin A1c 5.4.      "Total time 10:55 a.m. - 11:27 a.m..  Greater than 50% of time spent in discussion, counseling, and review.  Labs reviewed and ordered for follow-up medications reviewed addressed and prescribed as well    Review of Systems   Constitutional: Negative for fever and unexpected weight change.   HENT: Negative for congestion, postnasal drip and rhinorrhea.    Respiratory: Negative for cough, chest tightness, shortness of breath and wheezing.    Cardiovascular: Negative for chest pain, palpitations and leg swelling.   Gastrointestinal: Negative for abdominal pain, blood in stool, constipation, diarrhea, nausea and vomiting.   Genitourinary: Negative for dysuria and hematuria.   Musculoskeletal: Positive for arthralgias and myalgias.   Skin: Negative for rash.   Neurological: Negative for syncope.   Psychiatric/Behavioral: Negative for dysphoric mood. The patient is not nervous/anxious.        Objective:      Vitals:    12/12/19 1006   BP: 136/74   BP Location: Left arm   Patient Position: Sitting   BP Method: Large (Manual)   Pulse: (!) 57   Temp: 97.6 °F (36.4 °C)   TempSrc: Oral   SpO2: 98%   Weight: 120.1 kg (264 lb 14.1 oz)   Height: 5' 5" (1.651 m)     Body mass index is 44.08 kg/m².    Physical Exam   Constitutional: She is oriented to person, place, and time. She appears well-developed and well-nourished.   HENT:   Head: Normocephalic and atraumatic.   Eyes: EOM are normal.   Neck: Normal range of motion. Neck supple. No thyromegaly present.   No carotid bruits heard.    Cardiovascular: Normal rate, regular rhythm and normal heart sounds. Exam reveals no gallop.   No murmur heard.  HOMER: 3-4/6 aortic; 2/6 pulm; and 3/6 LLSB   Pulmonary/Chest: Effort normal and breath sounds normal. No respiratory distress. She has no wheezes. She has no rales.   Abdominal: Soft. Bowel sounds are normal. She exhibits no distension. There is no tenderness. There is no rebound and no guarding.   Musculoskeletal: Normal range of motion. " She exhibits edema.   Obese LE's w edema; spider veins noted; no calf tenderness to palp. No L-S sp tenderness to palp.    Lymphadenopathy:     She has no cervical adenopathy.   Neurological: She is alert and oriented to person, place, and time.   Moves all 4 extremities fine.   Skin: No rash noted.   Psychiatric: She has a normal mood and affect. Her behavior is normal. Thought content normal.   Vitals reviewed.      Assessment:       1. Benign essential HTN    2. Other hyperlipidemia    3. Statin intolerance    4. Coronary artery disease involving native coronary artery of native heart without angina pectoris    5. Aortic valve stenosis, etiology of cardiac valve disease unspecified    6. Mitral valve insufficiency, unspecified etiology    7. History of PSVT (paroxysmal supraventricular tachycardia)    8. Impaired glucose tolerance    9. Morbid obesity    10. Normocytic anemia    11. Breast cancer screening    12. Chronic midline low back pain with right-sided sciatica        Plan:       Benign essential HTN.Maintain < 2 Gm Na a day diet, and monitor BP at home; keep a log. Needs to get a biceps cuff for home use.   -     CBC auto differential; Future; Expected date: 12/12/2019  -     Comprehensive metabolic panel; Future; Expected date: 12/12/2019  -     Lipid panel; Future; Expected date: 12/12/2019  -     Magnesium; Future; Expected date: 12/12/2019  -     TSH; Future; Expected date: 12/12/2019    Other hyperlipidemia.Maintain low fat high fiber diet, exercise regularly. Weight reduction where indicated. Cont lovastatin at 40 mg 2x a day. Add zetia 10 mg a day.   -     ezetimibe (ZETIA) 10 mg tablet; Take 1 tablet (10 mg total) by mouth once daily.  Dispense: 90 tablet; Refill: 1  -     Comprehensive metabolic panel; Future; Expected date: 12/12/2019  -     Lipid panel; Future; Expected date: 12/12/2019    Statin intolerance:  Tolerating lovastatin 40 mg b.i.d..  However..               ezetimibe (ZETIA) 10 mg  tablet; Take 1 tablet (10 mg total) by mouth once daily.  Dispense: 90       tablet; Refill: 1  -     Comprehensive metabolic panel; Future; Expected date: 12/12/2019  -     Lipid panel; Future; Expected date: 12/12/2019    Coronary artery disease involving native coronary artery of native heart without angina pectoris; has been stable; sees Dr Rachel as cardiologist.  LDL goal less than 70; needs to try adhering to low-fat high-fiber diet better as well as a low carb diet with salt restriction.  -     ezetimibe (ZETIA) 10 mg tablet; Take 1 tablet (10 mg total) by mouth once daily.  Dispense: 90 tablet; Refill: 1  -     Comprehensive metabolic panel; Future; Expected date: 12/12/2019  -     Lipid panel; Future; Expected date: 12/12/2019    Mild to moderate aortic stenosis:  02/18/2019 echo with mild to moderate aortic valve stenosis with area 1.56 sq cm and gradient of 12.  Followed by Cardiology     Mild Mitral regurgitation:  Noted as being mi on 02/18/2019 echo with normal LV ejection fraction 55%; mild TR also noted.    History of PSVT (paroxysmal supraventricular tachycardia); has been stable; watches her caffeine    Impaired glucose tolerance.Exercise recommended with weight reduction and low carb diet; we'll follow hemoglobin A1c's with you periodically.  -     Comprehensive metabolic panel; Future; Expected date: 12/12/2019    Morbid obesity.Caloric restriction w regular exercise and weight reduction.    Normocytic anemia; Women's 50+ one a day to continue.   -     CBC auto differential; Future; Expected date: 12/12/2019    Breast cancer screening: recent mammogram 11/19/19 w no radiographic evidence of malignancy noted; routine screen in 1 yr; Dr Olmedo managing her mammograms; keep f/u's w her as directed; her NP does her breast exams; Gyn Dr Snowden does her pelvics and paps.     Chr LBP w right sciatica: has completed 6 weeks of PT; doing a lot better; cont home exercises; tylenol arthritis for  pain.     Vitamin-D deficiency.  Takes cardiamin as well as calcium with vitamin-D; needs updated vitamin-D level; last DEXA scan in 2018 was within normal limits.

## 2019-12-12 NOTE — PATIENT INSTRUCTIONS
Benign essential HTN.Maintain < 2 Gm Na a day diet, and monitor BP at home; keep a log. Needs to get a biceps cuff for home use.   -     CBC auto differential; Future; Expected date: 12/12/2019  -     Comprehensive metabolic panel; Future; Expected date: 12/12/2019  -     Lipid panel; Future; Expected date: 12/12/2019  -     Magnesium; Future; Expected date: 12/12/2019  -     TSH; Future; Expected date: 12/12/2019    Other hyperlipidemia.Maintain low fat high fiber diet, exercise regularly. Weight reduction where indicated. Cont lovastatin at 40 mg 2x a day. Add zetia 10 mg a day.   -     ezetimibe (ZETIA) 10 mg tablet; Take 1 tablet (10 mg total) by mouth once daily.  Dispense: 90 tablet; Refill: 1  -     Comprehensive metabolic panel; Future; Expected date: 12/12/2019  -     Lipid panel; Future; Expected date: 12/12/2019    Statin intolerance  -     ezetimibe (ZETIA) 10 mg tablet; Take 1 tablet (10 mg total) by mouth once daily.  Dispense: 90 tablet; Refill: 1  -     Comprehensive metabolic panel; Future; Expected date: 12/12/2019  -     Lipid panel; Future; Expected date: 12/12/2019    Coronary artery disease involving native coronary artery of native heart without angina pectoris; has been stable; sees Dr Rachel as cardiologist.   -     ezetimibe (ZETIA) 10 mg tablet; Take 1 tablet (10 mg total) by mouth once daily.  Dispense: 90 tablet; Refill: 1  -     Comprehensive metabolic panel; Future; Expected date: 12/12/2019  -     Lipid panel; Future; Expected date: 12/12/2019    Impaired glucose tolerance.Exercise recommended with weight reduction and low carb diet; we'll follow hemoglobin A1c's with you periodically.  -     Comprehensive metabolic panel; Future; Expected date: 12/12/2019    History of PSVT (paroxysmal supraventricular tachycardia); has been stable; watches her caffeine    Morbid obesity.Caloric restriction w regular exercise and weight reduction.    Normocytic anemia; Women's 50+ one a day to  continue.   -     CBC auto differential; Future; Expected date: 12/12/2019    Chronic lower back pain w right sciatica: has completed 6 weeks of PT; doing a lot better; cont home exercises; tylenol arthritis for pain.

## 2019-12-21 ENCOUNTER — TELEPHONE (OUTPATIENT)
Dept: FAMILY MEDICINE | Facility: CLINIC | Age: 74
End: 2019-12-21

## 2020-02-05 RX ORDER — HYDROCHLOROTHIAZIDE 12.5 MG/1
12.5 TABLET ORAL DAILY
Qty: 90 TABLET | Refills: 1 | Status: SHIPPED | OUTPATIENT
Start: 2020-02-05 | End: 2021-01-06

## 2020-02-05 NOTE — TELEPHONE ENCOUNTER
----- Message from Carolyn Murray sent at 2/5/2020  1:26 PM CST -----  Contact: Self  Pt calling in regards to refill on meds ASAP, pt states she is completely out and been trying to contact office but no one return call      ==hydroCHLOROthiazide (HYDRODIURIL) 12.5 MG Tab    [id number] - [place], [place] - [address]    Please advise pt can be contact at [phone]

## 2020-03-03 DIAGNOSIS — Z91.89 AT RISK FOR ADVERSE DRUG EVENT: ICD-10-CM

## 2020-03-03 DIAGNOSIS — I10 BENIGN ESSENTIAL HTN: ICD-10-CM

## 2020-03-05 RX ORDER — TELMISARTAN 40 MG/1
TABLET ORAL
Qty: 90 TABLET | Refills: 1 | OUTPATIENT
Start: 2020-03-05

## 2020-03-10 ENCOUNTER — LAB VISIT (OUTPATIENT)
Dept: LAB | Facility: HOSPITAL | Age: 75
End: 2020-03-10
Attending: INTERNAL MEDICINE
Payer: MEDICARE

## 2020-03-10 DIAGNOSIS — Z78.9 STATIN INTOLERANCE: ICD-10-CM

## 2020-03-10 DIAGNOSIS — I10 BENIGN ESSENTIAL HTN: ICD-10-CM

## 2020-03-10 DIAGNOSIS — E78.49 OTHER HYPERLIPIDEMIA: ICD-10-CM

## 2020-03-10 DIAGNOSIS — D64.9 NORMOCYTIC ANEMIA: ICD-10-CM

## 2020-03-10 DIAGNOSIS — R73.02 IMPAIRED GLUCOSE TOLERANCE: ICD-10-CM

## 2020-03-10 DIAGNOSIS — I25.10 CORONARY ARTERY DISEASE INVOLVING NATIVE CORONARY ARTERY OF NATIVE HEART WITHOUT ANGINA PECTORIS: ICD-10-CM

## 2020-03-10 DIAGNOSIS — E55.9 VITAMIN D DEFICIENCY: ICD-10-CM

## 2020-03-10 LAB
25(OH)D3+25(OH)D2 SERPL-MCNC: 38 NG/ML (ref 30–96)
ALBUMIN SERPL BCP-MCNC: 3.6 G/DL (ref 3.5–5.2)
ALP SERPL-CCNC: 96 U/L (ref 55–135)
ALT SERPL W/O P-5'-P-CCNC: 11 U/L (ref 10–44)
ANION GAP SERPL CALC-SCNC: 10 MMOL/L (ref 8–16)
AST SERPL-CCNC: 16 U/L (ref 10–40)
BASOPHILS # BLD AUTO: 0.05 K/UL (ref 0–0.2)
BASOPHILS NFR BLD: 0.6 % (ref 0–1.9)
BILIRUB SERPL-MCNC: 0.5 MG/DL (ref 0.1–1)
BUN SERPL-MCNC: 21 MG/DL (ref 8–23)
CALCIUM SERPL-MCNC: 9.7 MG/DL (ref 8.7–10.5)
CHLORIDE SERPL-SCNC: 104 MMOL/L (ref 95–110)
CHOLEST SERPL-MCNC: 221 MG/DL (ref 120–199)
CHOLEST/HDLC SERPL: 4.8 {RATIO} (ref 2–5)
CO2 SERPL-SCNC: 26 MMOL/L (ref 23–29)
CREAT SERPL-MCNC: 0.7 MG/DL (ref 0.5–1.4)
DIFFERENTIAL METHOD: ABNORMAL
EOSINOPHIL # BLD AUTO: 0.1 K/UL (ref 0–0.5)
EOSINOPHIL NFR BLD: 1.7 % (ref 0–8)
ERYTHROCYTE [DISTWIDTH] IN BLOOD BY AUTOMATED COUNT: 14.5 % (ref 11.5–14.5)
EST. GFR  (AFRICAN AMERICAN): >60 ML/MIN/1.73 M^2
EST. GFR  (NON AFRICAN AMERICAN): >60 ML/MIN/1.73 M^2
ESTIMATED AVG GLUCOSE: 114 MG/DL (ref 68–131)
GLUCOSE SERPL-MCNC: 108 MG/DL (ref 70–110)
HBA1C MFR BLD HPLC: 5.6 % (ref 4–5.6)
HCT VFR BLD AUTO: 38 % (ref 37–48.5)
HDLC SERPL-MCNC: 46 MG/DL (ref 40–75)
HDLC SERPL: 20.8 % (ref 20–50)
HGB BLD-MCNC: 11.7 G/DL (ref 12–16)
IMM GRANULOCYTES # BLD AUTO: 0.02 K/UL (ref 0–0.04)
IMM GRANULOCYTES NFR BLD AUTO: 0.2 % (ref 0–0.5)
LDLC SERPL CALC-MCNC: 151.8 MG/DL (ref 63–159)
LYMPHOCYTES # BLD AUTO: 1.5 K/UL (ref 1–4.8)
LYMPHOCYTES NFR BLD: 18.8 % (ref 18–48)
MAGNESIUM SERPL-MCNC: 2 MG/DL (ref 1.6–2.6)
MCH RBC QN AUTO: 27.9 PG (ref 27–31)
MCHC RBC AUTO-ENTMCNC: 30.8 G/DL (ref 32–36)
MCV RBC AUTO: 91 FL (ref 82–98)
MONOCYTES # BLD AUTO: 0.7 K/UL (ref 0.3–1)
MONOCYTES NFR BLD: 8.5 % (ref 4–15)
NEUTROPHILS # BLD AUTO: 5.7 K/UL (ref 1.8–7.7)
NEUTROPHILS NFR BLD: 70.2 % (ref 38–73)
NONHDLC SERPL-MCNC: 175 MG/DL
NRBC BLD-RTO: 0 /100 WBC
PLATELET # BLD AUTO: 245 K/UL (ref 150–350)
PMV BLD AUTO: 11.4 FL (ref 9.2–12.9)
POTASSIUM SERPL-SCNC: 4.2 MMOL/L (ref 3.5–5.1)
PROT SERPL-MCNC: 7.5 G/DL (ref 6–8.4)
RBC # BLD AUTO: 4.19 M/UL (ref 4–5.4)
SODIUM SERPL-SCNC: 140 MMOL/L (ref 136–145)
TRIGL SERPL-MCNC: 116 MG/DL (ref 30–150)
WBC # BLD AUTO: 8.16 K/UL (ref 3.9–12.7)

## 2020-03-10 PROCEDURE — 85025 COMPLETE CBC W/AUTO DIFF WBC: CPT | Mod: HCNC

## 2020-03-10 PROCEDURE — 80061 LIPID PANEL: CPT | Mod: HCNC

## 2020-03-10 PROCEDURE — 82306 VITAMIN D 25 HYDROXY: CPT | Mod: HCNC

## 2020-03-10 PROCEDURE — 83735 ASSAY OF MAGNESIUM: CPT | Mod: HCNC

## 2020-03-10 PROCEDURE — 83036 HEMOGLOBIN GLYCOSYLATED A1C: CPT | Mod: HCNC

## 2020-03-10 PROCEDURE — 80053 COMPREHEN METABOLIC PANEL: CPT | Mod: HCNC

## 2020-03-10 PROCEDURE — 36415 COLL VENOUS BLD VENIPUNCTURE: CPT | Mod: HCNC,PO

## 2020-03-17 ENCOUNTER — OFFICE VISIT (OUTPATIENT)
Dept: FAMILY MEDICINE | Facility: CLINIC | Age: 75
End: 2020-03-17
Payer: MEDICARE

## 2020-03-17 VITALS
HEIGHT: 65 IN | HEART RATE: 57 BPM | WEIGHT: 262.13 LBS | SYSTOLIC BLOOD PRESSURE: 116 MMHG | TEMPERATURE: 98 F | OXYGEN SATURATION: 96 % | DIASTOLIC BLOOD PRESSURE: 62 MMHG | BODY MASS INDEX: 43.67 KG/M2

## 2020-03-17 DIAGNOSIS — I10 BENIGN ESSENTIAL HTN: ICD-10-CM

## 2020-03-17 DIAGNOSIS — D64.9 NORMOCYTIC ANEMIA: ICD-10-CM

## 2020-03-17 DIAGNOSIS — Z78.9 STATIN INTOLERANCE: ICD-10-CM

## 2020-03-17 DIAGNOSIS — I10 BENIGN ESSENTIAL HTN: Primary | ICD-10-CM

## 2020-03-17 DIAGNOSIS — R60.0 BILATERAL LOWER EXTREMITY EDEMA: ICD-10-CM

## 2020-03-17 DIAGNOSIS — I35.0 MODERATE AORTIC STENOSIS: ICD-10-CM

## 2020-03-17 DIAGNOSIS — Z87.39 HISTORY OF OSTEOPENIA: ICD-10-CM

## 2020-03-17 DIAGNOSIS — E55.9 VITAMIN D DEFICIENCY: ICD-10-CM

## 2020-03-17 DIAGNOSIS — R73.02 IMPAIRED GLUCOSE TOLERANCE: ICD-10-CM

## 2020-03-17 DIAGNOSIS — I25.10 CORONARY ARTERY DISEASE INVOLVING NATIVE CORONARY ARTERY OF NATIVE HEART WITHOUT ANGINA PECTORIS: ICD-10-CM

## 2020-03-17 DIAGNOSIS — E78.49 OTHER HYPERLIPIDEMIA: ICD-10-CM

## 2020-03-17 DIAGNOSIS — I47.10 PSVT (PAROXYSMAL SUPRAVENTRICULAR TACHYCARDIA): ICD-10-CM

## 2020-03-17 DIAGNOSIS — Z91.89 AT RISK FOR ADVERSE DRUG EVENT: ICD-10-CM

## 2020-03-17 DIAGNOSIS — E66.01 MORBID OBESITY: ICD-10-CM

## 2020-03-17 PROCEDURE — 3078F DIAST BP <80 MM HG: CPT | Mod: HCNC,CPTII,S$GLB, | Performed by: INTERNAL MEDICINE

## 2020-03-17 PROCEDURE — 3074F SYST BP LT 130 MM HG: CPT | Mod: HCNC,CPTII,S$GLB, | Performed by: INTERNAL MEDICINE

## 2020-03-17 PROCEDURE — 1101F PT FALLS ASSESS-DOCD LE1/YR: CPT | Mod: HCNC,CPTII,S$GLB, | Performed by: INTERNAL MEDICINE

## 2020-03-17 PROCEDURE — 3074F PR MOST RECENT SYSTOLIC BLOOD PRESSURE < 130 MM HG: ICD-10-PCS | Mod: HCNC,CPTII,S$GLB, | Performed by: INTERNAL MEDICINE

## 2020-03-17 PROCEDURE — 3078F PR MOST RECENT DIASTOLIC BLOOD PRESSURE < 80 MM HG: ICD-10-PCS | Mod: HCNC,CPTII,S$GLB, | Performed by: INTERNAL MEDICINE

## 2020-03-17 PROCEDURE — 99214 PR OFFICE/OUTPT VISIT, EST, LEVL IV, 30-39 MIN: ICD-10-PCS | Mod: HCNC,S$GLB,, | Performed by: INTERNAL MEDICINE

## 2020-03-17 PROCEDURE — 99999 PR PBB SHADOW E&M-EST. PATIENT-LVL III: CPT | Mod: PBBFAC,HCNC,, | Performed by: INTERNAL MEDICINE

## 2020-03-17 PROCEDURE — 99999 PR PBB SHADOW E&M-EST. PATIENT-LVL III: ICD-10-PCS | Mod: PBBFAC,HCNC,, | Performed by: INTERNAL MEDICINE

## 2020-03-17 PROCEDURE — 1159F PR MEDICATION LIST DOCUMENTED IN MEDICAL RECORD: ICD-10-PCS | Mod: HCNC,S$GLB,, | Performed by: INTERNAL MEDICINE

## 2020-03-17 PROCEDURE — 99499 UNLISTED E&M SERVICE: CPT | Mod: HCNC,S$GLB,, | Performed by: INTERNAL MEDICINE

## 2020-03-17 PROCEDURE — 99214 OFFICE O/P EST MOD 30 MIN: CPT | Mod: HCNC,S$GLB,, | Performed by: INTERNAL MEDICINE

## 2020-03-17 PROCEDURE — 1101F PR PT FALLS ASSESS DOC 0-1 FALLS W/OUT INJ PAST YR: ICD-10-PCS | Mod: HCNC,CPTII,S$GLB, | Performed by: INTERNAL MEDICINE

## 2020-03-17 PROCEDURE — 1159F MED LIST DOCD IN RCRD: CPT | Mod: HCNC,S$GLB,, | Performed by: INTERNAL MEDICINE

## 2020-03-17 PROCEDURE — 99499 RISK ADDL DX/OHS AUDIT: ICD-10-PCS | Mod: HCNC,S$GLB,, | Performed by: INTERNAL MEDICINE

## 2020-03-17 RX ORDER — EZETIMIBE 10 MG/1
10 TABLET ORAL NIGHTLY
Qty: 90 TABLET | Refills: 1 | Status: SHIPPED | OUTPATIENT
Start: 2020-03-17 | End: 2020-09-24

## 2020-03-17 NOTE — PROGRESS NOTES
Subjective:       Patient ID: Elicia Cavazos is a 74 y.o. female.    Chief Complaint: Follow-up (review lab results)    HPI  patient here today for reassessment and go over labs.  Essential hypertension:  Blood pressure at home has been averaging 130/75; she watch his salt intake; here manually her blood pressure is 116/62.  Other hyperlipidemia:  On low-fat high-fiber diet or at least tries; tolerates lovastatin fine.  On fish oil also; in lieu of present data can DC her fish oil.  Needs better control of her lipid panel as total cholesterol 221 with HDL 46 and .8 Forgot to  zetia from last visit.   Coronary artery disease:  No complaints of chest pain, shortness of breath, or palpitations.  Her cardiologist is Dr. Rachel.  Aortic stenosis:  Followed by Cardiology Dr. Rachel  History of PSVT:  No complaint of palpitations.  On metoprolol 50 mg per day  History of osteopenia:  12/10/2008 DEXA scan with mild osteopenia noted.  02/12/2018 DEXA scan with T-score is within normal limits; recommend repeat DEXA scan in 3 years or after 02/12/2021.  Morbid Obesity:  BMI 43.62; exercises twice a week 15-20 minutes each time goal is 4 to 5 times a week weight-bearing exercise 25-30 minutes if possible; small portions also help her weight come down; 3 lb drop since 9/10/19.  Buddy LE edema: improving.   Impaired glucose tolerance:   with hemoglobin A1c 5.6; knows the importance of regular exercise with weight reduction as well as carbohydrate restriction    Review of Systems   Constitutional: Negative for fever and unexpected weight change.   HENT: Negative for congestion, postnasal drip and rhinorrhea.    Respiratory: Negative for cough, chest tightness, shortness of breath and wheezing.    Cardiovascular: Negative for chest pain, palpitations and leg swelling.   Gastrointestinal: Negative for abdominal pain, blood in stool, constipation, diarrhea, nausea and vomiting.   Genitourinary: Negative for dysuria and  "hematuria.   Musculoskeletal: Negative for arthralgias and myalgias.   Skin: Negative for rash.   Allergic/Immunologic: Negative for environmental allergies and food allergies.   Neurological: Negative for syncope and weakness.   Psychiatric/Behavioral: Negative for dysphoric mood. The patient is not nervous/anxious.        Objective:      Vitals:    03/17/20 1012   BP: 116/62   BP Location: Right arm   Patient Position: Sitting   BP Method: Large (Manual)   Pulse: (!) 57   Temp: 98.2 °F (36.8 °C)   TempSrc: Oral   SpO2: 96%   Weight: 118.9 kg (262 lb 2 oz)   Height: 5' 5" (1.651 m)     Body mass index is 43.62 kg/m².  Wt Readings from Last 3 Encounters:   03/17/20 118.9 kg (262 lb 2 oz)   12/12/19 120.1 kg (264 lb 14.1 oz)   09/10/19 117.8 kg (259 lb 9.5 oz)        Physical Exam   Constitutional: She is oriented to person, place, and time. She appears well-developed and well-nourished.   HENT:   Head: Normocephalic and atraumatic.   Eyes: EOM are normal.   Neck: Normal range of motion. Neck supple. No thyromegaly present.   Cardiovascular: Normal rate and regular rhythm. Exam reveals no gallop.   No murmur heard.  HOMER 2-3/6 at aortic.    Pulmonary/Chest: Effort normal and breath sounds normal. No respiratory distress. She has no wheezes. She has no rales.   Abdominal: Soft. Bowel sounds are normal. She exhibits no distension. There is no tenderness. There is no rebound and no guarding.   Musculoskeletal: Normal range of motion. She exhibits edema.   3-4+ w spider veins; no calf tenderness to palp.    Lymphadenopathy:     She has no cervical adenopathy.   Neurological: She is alert and oriented to person, place, and time.   Moves all 4 extremities fine.   Skin: No rash noted.   Psychiatric: She has a normal mood and affect. Her behavior is normal. Thought content normal.   Vitals reviewed.      Assessment:       1. Benign essential HTN    2. Other hyperlipidemia    3. Statin intolerance    4. Coronary artery disease " involving native coronary artery of native heart without angina pectoris    5. Moderate aortic stenosis    6. PSVT (paroxysmal supraventricular tachycardia)    7. Morbid obesity    8. Impaired glucose tolerance    9. History of osteopenia    10. Vitamin D deficiency    11. Normochromic anemia    12. Bilateral lower extremity edema        Plan:       Benign essential HTN. Maintain < 2 Gm Na a day diet, and monitor BP at home; keep a log. Same treatment.   -     Comprehensive metabolic panel; Future; Expected date: 03/17/2020  -     Lipid panel; Future; Expected date: 03/17/2020    Other hyperlipidemia.Maintain low fat high fiber diet, exercise regularly. Weight reduction where indicated.Add zetia 10 mg every night. Cont lovastatin 40 mg 2x a day.   -     Comprehensive metabolic panel; Future; Expected date: 03/17/2020  -     Lipid panel; Future; Expected date: 03/17/2020    Statin intolerance    Coronary artery disease involving native coronary artery of native heart without angina pectoris; has been stable; Card is Dr Rachel.     Moderate aortic stenosis; has been stable; followed by Dr Rachel    PSVT (paroxysmal supraventricular tachycardia); no palpitations, sees Virginie; on metoprolol.     Morbid obesity.Caloric restriction w regular exercise and weight reduction.    Impaired glucose tolerance.Caloric restriction w regular exercise and weight reduction.  -     Comprehensive metabolic panel; Future; Expected date: 03/17/2020    History of osteopenia; .Weight bearing exercises, continue calcium and vit D supplements. DEXA scabn due 2/12/2021    Vitamin D deficiency: increase from 1000 to 2000 iu a day.     Normocytic anemia: stable on Women's 50+ one a day by [facility]; iFOBT kit needed.   -     CBC auto differential; Future; Expected date: 03/17/2020  -     Iron and TIBC; Future; Expected date: 03/17/2020  -     Ferritin; Future; Expected date: 03/17/2020  -     Reticulocytes; Future; Expected date: 03/17/2020

## 2020-03-17 NOTE — PATIENT INSTRUCTIONS
Benign essential HTN.Maintain < 2 Gm Na a day diet, and monitor BP at home; keep a log.  -     Comprehensive metabolic panel; Future; Expected date: 03/17/2020  -     Lipid panel; Future; Expected date: 03/17/2020    Other hyperlipidemia.Maintain low fat high fiber diet, exercise regularly. Weight reduction where indicated.Add zetia 10 mg every night. Cont lovastatin 40 mg 2x a day.   -     Comprehensive metabolic panel; Future; Expected date: 03/17/2020  -     Lipid panel; Future; Expected date: 03/17/2020    Statin intolerance    Coronary artery disease involving native coronary artery of native heart without angina pectoris; has been stable; Card is Dr Rachel.     Moderate aortic stenosis; has been stable; followed by Dr Rachel    PSVT (paroxysmal supraventricular tachycardia); no palpitations, sees Virginie; on metoprolol.     Morbid obesity.Caloric restriction w regular exercise and weight reduction.    Impaired glucose tolerance.Caloric restriction w regular exercise and weight reduction.  -     Comprehensive metabolic panel; Future; Expected date: 03/17/2020    History of osteopenia; .Weight bearing exercises, continue calcium and vit D supplements. DEXA scabn due 2/12/2021    Vitamin D deficiency: increase from 1000 to 2000 iu a day.     Normochromic anemia: stable on Women's 50+ one a day by [facility]; iFOBT kit needed.     -     CBC auto differential; Future; Expected date: 03/17/2020  -     Iron and TIBC; Future; Expected date: 03/17/2020  -     Ferritin; Future; Expected date: 03/17/2020  -     Reticulocytes; Future; Expected date: 03/17/2020

## 2020-03-17 NOTE — TELEPHONE ENCOUNTER
Last ov - 03/17/2020  Future ov - n/a  Last refill - 02/23/2020          Please review and advise. Thank you.

## 2020-03-18 ENCOUNTER — LAB VISIT (OUTPATIENT)
Dept: LAB | Facility: HOSPITAL | Age: 75
End: 2020-03-18
Attending: INTERNAL MEDICINE
Payer: MEDICARE

## 2020-03-18 DIAGNOSIS — D64.9 NORMOCYTIC ANEMIA: ICD-10-CM

## 2020-03-18 PROCEDURE — 82274 ASSAY TEST FOR BLOOD FECAL: CPT | Mod: HCNC

## 2020-03-18 RX ORDER — TELMISARTAN 40 MG/1
40 TABLET ORAL DAILY
Qty: 90 TABLET | Refills: 1 | Status: SHIPPED | OUTPATIENT
Start: 2020-03-18 | End: 2020-11-10

## 2020-03-20 LAB — HEMOCCULT STL QL IA: NEGATIVE

## 2020-05-06 ENCOUNTER — PATIENT MESSAGE (OUTPATIENT)
Dept: ADMINISTRATIVE | Facility: HOSPITAL | Age: 75
End: 2020-05-06

## 2020-06-10 ENCOUNTER — LAB VISIT (OUTPATIENT)
Dept: LAB | Facility: HOSPITAL | Age: 75
End: 2020-06-10
Attending: INTERNAL MEDICINE
Payer: MEDICARE

## 2020-06-10 DIAGNOSIS — I10 BENIGN ESSENTIAL HTN: ICD-10-CM

## 2020-06-10 DIAGNOSIS — D64.9 NORMOCYTIC ANEMIA: ICD-10-CM

## 2020-06-10 DIAGNOSIS — E78.49 OTHER HYPERLIPIDEMIA: ICD-10-CM

## 2020-06-10 DIAGNOSIS — E78.2 MIXED HYPERLIPIDEMIA: ICD-10-CM

## 2020-06-10 DIAGNOSIS — R73.02 IMPAIRED GLUCOSE TOLERANCE: ICD-10-CM

## 2020-06-10 LAB
ALBUMIN SERPL BCP-MCNC: 3.6 G/DL (ref 3.5–5.2)
ALBUMIN SERPL BCP-MCNC: 3.6 G/DL (ref 3.5–5.2)
ALP SERPL-CCNC: 95 U/L (ref 55–135)
ALP SERPL-CCNC: 95 U/L (ref 55–135)
ALT SERPL W/O P-5'-P-CCNC: 11 U/L (ref 10–44)
ALT SERPL W/O P-5'-P-CCNC: 11 U/L (ref 10–44)
ANION GAP SERPL CALC-SCNC: 9 MMOL/L (ref 8–16)
ANION GAP SERPL CALC-SCNC: 9 MMOL/L (ref 8–16)
AST SERPL-CCNC: 15 U/L (ref 10–40)
AST SERPL-CCNC: 15 U/L (ref 10–40)
BASOPHILS # BLD AUTO: 0.06 K/UL (ref 0–0.2)
BASOPHILS # BLD AUTO: 0.06 K/UL (ref 0–0.2)
BASOPHILS NFR BLD: 0.6 % (ref 0–1.9)
BASOPHILS NFR BLD: 0.6 % (ref 0–1.9)
BILIRUB SERPL-MCNC: 0.4 MG/DL (ref 0.1–1)
BILIRUB SERPL-MCNC: 0.4 MG/DL (ref 0.1–1)
BUN SERPL-MCNC: 24 MG/DL (ref 8–23)
BUN SERPL-MCNC: 24 MG/DL (ref 8–23)
CALCIUM SERPL-MCNC: 9.3 MG/DL (ref 8.7–10.5)
CALCIUM SERPL-MCNC: 9.3 MG/DL (ref 8.7–10.5)
CHLORIDE SERPL-SCNC: 107 MMOL/L (ref 95–110)
CHLORIDE SERPL-SCNC: 107 MMOL/L (ref 95–110)
CHOLEST SERPL-MCNC: 233 MG/DL (ref 120–199)
CHOLEST SERPL-MCNC: 233 MG/DL (ref 120–199)
CHOLEST/HDLC SERPL: 5.2 {RATIO} (ref 2–5)
CHOLEST/HDLC SERPL: 5.2 {RATIO} (ref 2–5)
CO2 SERPL-SCNC: 24 MMOL/L (ref 23–29)
CO2 SERPL-SCNC: 24 MMOL/L (ref 23–29)
CREAT SERPL-MCNC: 0.7 MG/DL (ref 0.5–1.4)
CREAT SERPL-MCNC: 0.7 MG/DL (ref 0.5–1.4)
DIFFERENTIAL METHOD: ABNORMAL
DIFFERENTIAL METHOD: ABNORMAL
EOSINOPHIL # BLD AUTO: 0.2 K/UL (ref 0–0.5)
EOSINOPHIL # BLD AUTO: 0.2 K/UL (ref 0–0.5)
EOSINOPHIL NFR BLD: 2.1 % (ref 0–8)
EOSINOPHIL NFR BLD: 2.1 % (ref 0–8)
ERYTHROCYTE [DISTWIDTH] IN BLOOD BY AUTOMATED COUNT: 14.1 % (ref 11.5–14.5)
ERYTHROCYTE [DISTWIDTH] IN BLOOD BY AUTOMATED COUNT: 14.1 % (ref 11.5–14.5)
EST. GFR  (AFRICAN AMERICAN): >60 ML/MIN/1.73 M^2
EST. GFR  (AFRICAN AMERICAN): >60 ML/MIN/1.73 M^2
EST. GFR  (NON AFRICAN AMERICAN): >60 ML/MIN/1.73 M^2
EST. GFR  (NON AFRICAN AMERICAN): >60 ML/MIN/1.73 M^2
FERRITIN SERPL-MCNC: 129 NG/ML (ref 20–300)
GLUCOSE SERPL-MCNC: 100 MG/DL (ref 70–110)
GLUCOSE SERPL-MCNC: 100 MG/DL (ref 70–110)
HCT VFR BLD AUTO: 36.7 % (ref 37–48.5)
HCT VFR BLD AUTO: 36.7 % (ref 37–48.5)
HDLC SERPL-MCNC: 45 MG/DL (ref 40–75)
HDLC SERPL-MCNC: 45 MG/DL (ref 40–75)
HDLC SERPL: 19.3 % (ref 20–50)
HDLC SERPL: 19.3 % (ref 20–50)
HGB BLD-MCNC: 11.6 G/DL (ref 12–16)
HGB BLD-MCNC: 11.6 G/DL (ref 12–16)
IMM GRANULOCYTES # BLD AUTO: 0.07 K/UL (ref 0–0.04)
IMM GRANULOCYTES # BLD AUTO: 0.07 K/UL (ref 0–0.04)
IMM GRANULOCYTES NFR BLD AUTO: 0.7 % (ref 0–0.5)
IMM GRANULOCYTES NFR BLD AUTO: 0.7 % (ref 0–0.5)
IRON SERPL-MCNC: 51 UG/DL (ref 30–160)
LDLC SERPL CALC-MCNC: 164.6 MG/DL (ref 63–159)
LDLC SERPL CALC-MCNC: 164.6 MG/DL (ref 63–159)
LYMPHOCYTES # BLD AUTO: 1.6 K/UL (ref 1–4.8)
LYMPHOCYTES # BLD AUTO: 1.6 K/UL (ref 1–4.8)
LYMPHOCYTES NFR BLD: 16.6 % (ref 18–48)
LYMPHOCYTES NFR BLD: 16.6 % (ref 18–48)
MAGNESIUM SERPL-MCNC: 2 MG/DL (ref 1.6–2.6)
MCH RBC QN AUTO: 28.7 PG (ref 27–31)
MCH RBC QN AUTO: 28.7 PG (ref 27–31)
MCHC RBC AUTO-ENTMCNC: 31.6 G/DL (ref 32–36)
MCHC RBC AUTO-ENTMCNC: 31.6 G/DL (ref 32–36)
MCV RBC AUTO: 91 FL (ref 82–98)
MCV RBC AUTO: 91 FL (ref 82–98)
MONOCYTES # BLD AUTO: 0.7 K/UL (ref 0.3–1)
MONOCYTES # BLD AUTO: 0.7 K/UL (ref 0.3–1)
MONOCYTES NFR BLD: 7.5 % (ref 4–15)
MONOCYTES NFR BLD: 7.5 % (ref 4–15)
NEUTROPHILS # BLD AUTO: 6.9 K/UL (ref 1.8–7.7)
NEUTROPHILS # BLD AUTO: 6.9 K/UL (ref 1.8–7.7)
NEUTROPHILS NFR BLD: 72.5 % (ref 38–73)
NEUTROPHILS NFR BLD: 72.5 % (ref 38–73)
NONHDLC SERPL-MCNC: 188 MG/DL
NONHDLC SERPL-MCNC: 188 MG/DL
NRBC BLD-RTO: 0 /100 WBC
NRBC BLD-RTO: 0 /100 WBC
PLATELET # BLD AUTO: 234 K/UL (ref 150–350)
PLATELET # BLD AUTO: 234 K/UL (ref 150–350)
PMV BLD AUTO: 11.5 FL (ref 9.2–12.9)
PMV BLD AUTO: 11.5 FL (ref 9.2–12.9)
POTASSIUM SERPL-SCNC: 4 MMOL/L (ref 3.5–5.1)
POTASSIUM SERPL-SCNC: 4 MMOL/L (ref 3.5–5.1)
PROT SERPL-MCNC: 7.3 G/DL (ref 6–8.4)
PROT SERPL-MCNC: 7.3 G/DL (ref 6–8.4)
RBC # BLD AUTO: 4.04 M/UL (ref 4–5.4)
RBC # BLD AUTO: 4.04 M/UL (ref 4–5.4)
RETICS/RBC NFR AUTO: 2 % (ref 0.5–2.5)
SATURATED IRON: 16 % (ref 20–50)
SODIUM SERPL-SCNC: 140 MMOL/L (ref 136–145)
SODIUM SERPL-SCNC: 140 MMOL/L (ref 136–145)
TOTAL IRON BINDING CAPACITY: 314 UG/DL (ref 250–450)
TRANSFERRIN SERPL-MCNC: 212 MG/DL (ref 200–375)
TRIGL SERPL-MCNC: 117 MG/DL (ref 30–150)
TRIGL SERPL-MCNC: 117 MG/DL (ref 30–150)
WBC # BLD AUTO: 9.5 K/UL (ref 3.9–12.7)
WBC # BLD AUTO: 9.5 K/UL (ref 3.9–12.7)

## 2020-06-10 PROCEDURE — 82728 ASSAY OF FERRITIN: CPT | Mod: HCNC

## 2020-06-10 PROCEDURE — 85025 COMPLETE CBC W/AUTO DIFF WBC: CPT | Mod: HCNC

## 2020-06-10 PROCEDURE — 36415 COLL VENOUS BLD VENIPUNCTURE: CPT | Mod: HCNC,PO

## 2020-06-10 PROCEDURE — 80061 LIPID PANEL: CPT | Mod: HCNC

## 2020-06-10 PROCEDURE — 80053 COMPREHEN METABOLIC PANEL: CPT | Mod: HCNC

## 2020-06-10 PROCEDURE — 83735 ASSAY OF MAGNESIUM: CPT | Mod: HCNC

## 2020-06-10 PROCEDURE — 83540 ASSAY OF IRON: CPT | Mod: HCNC

## 2020-06-10 PROCEDURE — 85045 AUTOMATED RETICULOCYTE COUNT: CPT | Mod: HCNC

## 2020-06-16 ENCOUNTER — TELEPHONE (OUTPATIENT)
Dept: FAMILY MEDICINE | Facility: CLINIC | Age: 75
End: 2020-06-16

## 2020-06-16 NOTE — TELEPHONE ENCOUNTER
Spoke with patient and she states that she is having leg pain in both legs. Offered appt with another provider and she refused. No appts available. States that she is unsure if it is the statin that is causing the pain.

## 2020-06-16 NOTE — TELEPHONE ENCOUNTER
----- Message from Anita Hare sent at 6/16/2020 11:59 AM CDT -----  Type: Needs Medical Advice  Who Called: Pt  Symptoms (please be specific):    How long has patient had these symptoms:   Pharmacy name and phone #:    Best Call Back Number:   Additional Information: requesting a call back. Pt would like to make another appt, but she want one in the morning. Pt is keeping appt on 07/07 Pt has pain in her legs. Pt think it due to the medication. Please contact pt.

## 2020-06-16 NOTE — TELEPHONE ENCOUNTER
Please ex patient to decrease her Mevacor to 40 mg p.o. each night only and stop taking it twice a day.  She is also to start taking Qunol which is 100 mg daily over-the-counter.  She can take Tylenol arthritis over-the-counter for pain.  If the pain worsens she can go to the emergency room or to the urgent care for further evaluation.  Please have her schedule appointment to be seen as soon as possible.  Tried to reach patient by phone but received no answer

## 2020-06-25 ENCOUNTER — TELEPHONE (OUTPATIENT)
Dept: FAMILY MEDICINE | Facility: CLINIC | Age: 75
End: 2020-06-25

## 2020-06-25 DIAGNOSIS — M25.569 KNEE PAIN, UNSPECIFIED CHRONICITY, UNSPECIFIED LATERALITY: Primary | ICD-10-CM

## 2020-06-25 NOTE — TELEPHONE ENCOUNTER
----- Message from Shraddha Dixon sent at 6/25/2020  9:46 AM CDT -----  Regarding: Referral  Contact: pt  Patient is asking for  a referral to see the Rheumatologist  for pain in her   Knees and the back of her knees. It seems to have gotten worst in the past   Few days.         Call back [phone]

## 2020-07-01 ENCOUNTER — TELEPHONE (OUTPATIENT)
Dept: PHYSICAL MEDICINE AND REHAB | Facility: CLINIC | Age: 75
End: 2020-07-01

## 2020-07-01 DIAGNOSIS — M25.561 PAIN IN BOTH KNEES, UNSPECIFIED CHRONICITY: Primary | ICD-10-CM

## 2020-07-01 DIAGNOSIS — M25.562 PAIN IN BOTH KNEES, UNSPECIFIED CHRONICITY: Primary | ICD-10-CM

## 2020-07-02 ENCOUNTER — PATIENT OUTREACH (OUTPATIENT)
Dept: ADMINISTRATIVE | Facility: OTHER | Age: 75
End: 2020-07-02

## 2020-07-02 NOTE — PROGRESS NOTES
Requested updates within Care Everywhere.  Patient's chart was reviewed for overdue SANDY topics.  Immunizations reconciled.

## 2020-07-06 ENCOUNTER — HOSPITAL ENCOUNTER (OUTPATIENT)
Dept: RADIOLOGY | Facility: HOSPITAL | Age: 75
Discharge: HOME | End: 2020-07-06
Attending: PHYSICAL MEDICINE & REHABILITATION
Payer: MEDICARE

## 2020-07-06 ENCOUNTER — OFFICE VISIT (OUTPATIENT)
Dept: PHYSICAL MEDICINE AND REHAB | Facility: CLINIC | Age: 75
End: 2020-07-06
Payer: MEDICARE

## 2020-07-06 VITALS — HEIGHT: 65 IN | WEIGHT: 262 LBS | BODY MASS INDEX: 43.65 KG/M2

## 2020-07-06 DIAGNOSIS — M25.562 PAIN IN BOTH KNEES, UNSPECIFIED CHRONICITY: ICD-10-CM

## 2020-07-06 DIAGNOSIS — M17.0 PRIMARY OSTEOARTHRITIS OF BOTH KNEES: Primary | ICD-10-CM

## 2020-07-06 DIAGNOSIS — M25.561 PAIN IN BOTH KNEES, UNSPECIFIED CHRONICITY: ICD-10-CM

## 2020-07-06 PROCEDURE — 99999 PR PBB SHADOW E&M-EST. PATIENT-LVL IV: CPT | Mod: PBBFAC,HCNC,, | Performed by: PHYSICAL MEDICINE & REHABILITATION

## 2020-07-06 PROCEDURE — 1101F PR PT FALLS ASSESS DOC 0-1 FALLS W/OUT INJ PAST YR: ICD-10-PCS | Mod: HCNC,CPTII,S$GLB, | Performed by: PHYSICAL MEDICINE & REHABILITATION

## 2020-07-06 PROCEDURE — 73564 X-RAY EXAM KNEE 4 OR MORE: CPT | Mod: TC,50,HCNC,PO

## 2020-07-06 PROCEDURE — 1125F AMNT PAIN NOTED PAIN PRSNT: CPT | Mod: HCNC,S$GLB,, | Performed by: PHYSICAL MEDICINE & REHABILITATION

## 2020-07-06 PROCEDURE — 99204 OFFICE O/P NEW MOD 45 MIN: CPT | Mod: 25,HCNC,GC,S$GLB | Performed by: PHYSICAL MEDICINE & REHABILITATION

## 2020-07-06 PROCEDURE — 20611 DRAIN/INJ JOINT/BURSA W/US: CPT | Mod: 50,HCNC,S$GLB, | Performed by: PHYSICAL MEDICINE & REHABILITATION

## 2020-07-06 PROCEDURE — 99204 PR OFFICE/OUTPT VISIT, NEW, LEVL IV, 45-59 MIN: ICD-10-PCS | Mod: 25,HCNC,GC,S$GLB | Performed by: PHYSICAL MEDICINE & REHABILITATION

## 2020-07-06 PROCEDURE — 1101F PT FALLS ASSESS-DOCD LE1/YR: CPT | Mod: HCNC,CPTII,S$GLB, | Performed by: PHYSICAL MEDICINE & REHABILITATION

## 2020-07-06 PROCEDURE — 1159F MED LIST DOCD IN RCRD: CPT | Mod: HCNC,S$GLB,, | Performed by: PHYSICAL MEDICINE & REHABILITATION

## 2020-07-06 PROCEDURE — 1125F PR PAIN SEVERITY QUANTIFIED, PAIN PRESENT: ICD-10-PCS | Mod: HCNC,S$GLB,, | Performed by: PHYSICAL MEDICINE & REHABILITATION

## 2020-07-06 PROCEDURE — 73564 X-RAY EXAM KNEE 4 OR MORE: CPT | Mod: 26,50,HCNC, | Performed by: RADIOLOGY

## 2020-07-06 PROCEDURE — 20611 LARGE JOINT ASPIRATION/INJECTION: BILATERAL KNEE: ICD-10-PCS | Mod: 50,HCNC,S$GLB, | Performed by: PHYSICAL MEDICINE & REHABILITATION

## 2020-07-06 PROCEDURE — 99999 PR PBB SHADOW E&M-EST. PATIENT-LVL IV: ICD-10-PCS | Mod: PBBFAC,HCNC,, | Performed by: PHYSICAL MEDICINE & REHABILITATION

## 2020-07-06 PROCEDURE — 1159F PR MEDICATION LIST DOCUMENTED IN MEDICAL RECORD: ICD-10-PCS | Mod: HCNC,S$GLB,, | Performed by: PHYSICAL MEDICINE & REHABILITATION

## 2020-07-06 PROCEDURE — 73564 XR KNEE ORTHO BILAT WITH FLEXION: ICD-10-PCS | Mod: 26,50,HCNC, | Performed by: RADIOLOGY

## 2020-07-06 RX ORDER — TRIAMCINOLONE ACETONIDE 40 MG/ML
40 INJECTION, SUSPENSION INTRA-ARTICULAR; INTRAMUSCULAR
Status: DISCONTINUED | OUTPATIENT
Start: 2020-07-06 | End: 2020-07-07 | Stop reason: HOSPADM

## 2020-07-06 RX ADMIN — TRIAMCINOLONE ACETONIDE 40 MG: 40 INJECTION, SUSPENSION INTRA-ARTICULAR; INTRAMUSCULAR at 11:07

## 2020-07-06 NOTE — PROGRESS NOTES
[facility]    Consulting Provider: Dr. Tita Garcia    CHIEF COMPLAINT:   Chief Complaint   Patient presents with    Knee Pain     Bilateral     HISTORY OF PRESENT ILLNESS: Lanie Cavazos is a 74 y.o. female who presents to me as a referral from Dr. Garcia for evaluation of bilateral knee pain which came on gradually about 6 weeks ago. Her pain is aching in nature, primarily posterolateral, and is worst when standing up from a seated position or going up steps. She denies any recent activity changes or injuries. She denies any popping, grinding, clicking, or instability.She has tried icy hot, heat, ice, and tylenol which each provide mild relief.     Review of Systems   Constitutional: Negative for fever.   HENT: Negative for drooling.    Eyes: Negative for discharge.   Respiratory: Negative for choking.    Cardiovascular: Negative for chest pain.   Genitourinary: Negative for flank pain.   Skin: Negative for wound.   Allergic/Immunologic: Negative for immunocompromised state.   Neurological: Negative for tremors and syncope.   Psychiatric/Behavioral: Negative for behavioral problems.     Past Medical History:   Past Medical History:   Diagnosis Date    Carotid artery stenosis     Coronary artery disease     Hyperlipidemia     Hypertension     Obesity     Paroxysmal supraventricular tachycardia        Past Surgical History:   Past Surgical History:   Procedure Laterality Date    APPENDECTOMY      BREAST BIOPSY Left     negative  needle asp  by Dr Olmedo    CARPAL TUNNEL RELEASE      CATARACT EXTRACTION BILATERAL W/ ANTERIOR VITRECTOMY      COLONOSCOPY N/A 12/18/2015    Procedure: COLONOSCOPY;  Surgeon: Timothy Syed Jr., MD;  Location: [facility];  Service: Endoscopy;  Laterality: N/A;repeatb in 3 yrs. Benign polyps w possible adenomatous change in transverse colon polyp..    EYE SURGERY      HAND SURGERY  01/01/2001    HYSTERECTOMY  1979    without BSO.    toenail extraction  Right 2016    4th toe.       Family History:   Family History   Problem Relation Age of Onset    Cancer Mother     Cancer Father     COPD Son     Ovarian cancer Neg Hx     Breast cancer Neg Hx        Medications:   Current Outpatient Medications on File Prior to Visit   Medication Sig Dispense Refill    acetaminophen (TYLENOL EXTRA STRENGTH) 500 MG tablet Take 500 mg by mouth 3 (three) times daily as needed for Pain.      ascorbic acid, vitamin C, (VITAMIN C) 500 MG tablet Take 500 mg by mouth once daily.      aspirin (ECOTRIN) 81 MG EC tablet Take 81 mg by mouth once daily. Three times a week Monday, Wednesday, Friday.      baclofen (LIORESAL) 10 MG tablet Use baclofen 10 mg po TID as needed for pain. 30 tablet 0    calcium-vitamin D 500-125 mg-unit tablet Take 1 tablet by mouth once daily.        cetirizine (ZYRTEC) 10 MG tablet Take 10 mg by mouth once daily.      cholestyramine, with sugar, 4 gram Powd 4 g or 1 scoop to be mixed in 8 oz water daily; if tolerated after 1 week increase to 2x a day. 378 g 2    ezetimibe (ZETIA) 10 mg tablet Take 1 tablet (10 mg total) by mouth every evening. 90 tablet 1    GLUCOSAMINE HCL/CHONDR VILLASENOR A NA (OSTEO BI-FLEX ORAL) Take by mouth once daily.      hydroCHLOROthiazide (HYDRODIURIL) 12.5 MG Tab Take 1 tablet (12.5 mg total) by mouth once daily. 90 tablet 1    lovastatin (MEVACOR) 40 MG tablet TAKE 40 MG ORALLY EACH MORNING AND EVENING, FOR TOTAL OF 80 MG DAILY. 180 tablet 1    methylcellulose, laxative, (CITRUCEL) 500 mg Tab Take by mouth once daily.      metoprolol succinate (TOPROL-XL) 50 MG 24 hr tablet Take 1 tablet (50 mg total) by mouth once daily. (Patient not taking: Reported on 12/12/2019) 90 tablet 1    multivitamin (MULTIVITAMIN) per tablet Take 1 tablet by mouth once daily.        mv-min-FA-D3-om3-dha-epa-fish (CARDIAMIN) 200 mcg-500 unit-200 mg Cap Take 1 tablet by mouth once daily.       nutritional supplement-fiber Liqd Take by mouth.       nutritional supplements Liqd Take by mouth.      nystatin (MYCOSTATIN) cream Apply topically 2 (two) times daily. 30 g 2    telmisartan (MICARDIS) 40 MG Tab Take 1 tablet (40 mg total) by mouth once daily. 90 tablet 1    terconazole (TERAZOL 7) 0.4 % Crea Place 1 applicator vaginally every evening. 45 g 4    triamcinolone (NASACORT) 55 mcg nasal inhaler 2 sprays by Nasal route once daily.      UBIDECARENONE (COENZYME Q10) 100 mg Tab Take 100 mg by mouth once daily.       vitamin E 400 UNIT capsule Take 400 Units by mouth once daily.      VITAMIN E/FLAXSEED OIL (FLAXSEED OIL-VITAMIN E) 1,000-1 mg-unit Cap Take 1,000 mg by mouth once daily.         No current facility-administered medications on file prior to visit.        Allergies:   Review of patient's allergies indicates:   Allergen Reactions    Crestor [rosuvastatin] Other (See Comments)     Muscle ache    Lipitor [atorvastatin] Other (See Comments)     Muscle ache    Pravachol [pravastatin] Other (See Comments)     Muscle ache    Sulfa (sulfonamide antibiotics) Swelling    Welchol [colesevelam] Other (See Comments)     Muscle ache    Zocor [simvastatin] Other (See Comments)     Muscle ache    Influenza virus vaccines Swelling and Rash     2014 flu vaccination       Social History:   Social History     Socioeconomic History    Marital status:      Spouse name: Not on file    Number of children: Not on file    Years of education: Not on file    Highest education level: Not on file   Occupational History    Occupation: retired .   Social Needs    Financial resource strain: Not on file    Food insecurity     Worry: Not on file     Inability: Not on file    Transportation needs     Medical: Not on file     Non-medical: Not on file   Tobacco Use    Smoking status: Never Smoker    Smokeless tobacco: Never Used   Substance and Sexual Activity    Alcohol use: No     Comment: rarely.    Drug use: No    Sexual activity: Never  "  Lifestyle    Physical activity     Days per week: Not on file     Minutes per session: Not on file    Stress: Not on file   Relationships    Social connections     Talks on phone: Not on file     Gets together: Not on file     Attends [employer] service: Not on file     Active member of club or organization: Not on file     Attends meetings of clubs or organizations: Not on file     Relationship status: Not on file   Other Topics Concern    Not on file   Social History Narrative    Not on file     PHYSICAL EXAMINATION:   General    Vitals:    07/06/20 1120   Weight: 118.8 kg (262 lb)   Height: 5' 5" (1.651 m)     Constitutional: Oriented to person, place, and time. No apparent distress. Pleasant.  HENT:   Head: Normocephalic and atraumatic.   Eyes: Right eye exhibits no discharge. Left eye exhibits no discharge. No scleral icterus.   Pulmonary/Chest: Effort normal. No respiratory distress.   Abdominal: There is no guarding.   Neurological: Alert and oriented to person, place, and time.   Psychiatric: Behavior is normal.   Right Knee Exam     Tenderness   The patient is experiencing tenderness in the lateral joint line and medial joint line.    Range of Motion   Extension: 0   Flexion: 110     Tests   Simi:  Medial - negative Lateral - negative  Varus: negative Valgus: negative  Drawer:  Anterior - negative      Patellar apprehension: negative    Other   Erythema: absent  Scars: absent  Sensation: normal  Swelling: none  Effusion: no effusion present      Left Knee Exam     Tenderness   The patient is experiencing tenderness in the lateral joint line.    Range of Motion   Extension: 0   Flexion: 100 (resisted by pain)     Tests   Simi:  Medial - negative Lateral - negative  Varus: negative Valgus: negative  Drawer:  Anterior - negative       Patellar apprehension: negative    Other   Erythema: absent  Scars: absent  Sensation: normal  Swelling: none  Effusion: effusion present        INSPECTION: There " "is no ecchymoses, erythema or gross deformity of the knees.  GAIT/DYNAMIC: Antalgic.    Imaging  Bilateral knee X-ray 7/6/20  Mild degenerative changes bilaterally    Data Reviewed: X-ray    Supportive Actions: Independent visualization of images or test specimens    ASSESSMENT:   1. Primary osteoarthritis of both knees    2. Pain in both knees, unspecified chronicity      PLAN:     1. Time was spent reviewing the above diagnosis in depth with Lanie shant, including acute management and rehabilitation.     2. We discussed that her pain is likely secondary to degenerative change of the knees.  Her level of osteoarthritis does not warrant consideration for knee arthroplasty at this time.  The risks and benefits of various treatment options and recommended PT, intraarticular corticosteroid injection, and topical analgesics. She was interested in CSI and topical medications but not formal PT.    3. Intraarticular corticosteroid injection to bilateral knees and aspiration of the left knee (see procedure note)    4. Recommended OTC voltaren gel and provided her with printed home exercise program to focus on stretching and strengthening of the muscles surrounding the knee.    5. RTC as needed. I encouraged her to reach out to us if any questions or issues arise.    This is a consult from Dr. Tita Garcia. Please see the "Communications" section of Epic to see how the consulting physician received the report of today's findings and recommendations. If it's an [facility] physician, it will be forwarded to his/her "in basket".    The above note was completed, in part, with the aid of Dragon dictation software/hardware. Translation errors may be present.  "

## 2020-07-07 ENCOUNTER — OFFICE VISIT (OUTPATIENT)
Dept: FAMILY MEDICINE | Facility: CLINIC | Age: 75
End: 2020-07-07
Payer: MEDICARE

## 2020-07-07 DIAGNOSIS — R73.02 IMPAIRED GLUCOSE TOLERANCE: ICD-10-CM

## 2020-07-07 DIAGNOSIS — E66.01 MORBID OBESITY: ICD-10-CM

## 2020-07-07 DIAGNOSIS — I25.10 CORONARY ARTERY DISEASE INVOLVING NATIVE CORONARY ARTERY OF NATIVE HEART WITHOUT ANGINA PECTORIS: ICD-10-CM

## 2020-07-07 DIAGNOSIS — Z78.9 STATIN INTOLERANCE: ICD-10-CM

## 2020-07-07 DIAGNOSIS — E78.49 OTHER HYPERLIPIDEMIA: ICD-10-CM

## 2020-07-07 DIAGNOSIS — I10 BENIGN ESSENTIAL HTN: Primary | ICD-10-CM

## 2020-07-07 DIAGNOSIS — R60.0 BILATERAL LOWER EXTREMITY EDEMA: ICD-10-CM

## 2020-07-07 DIAGNOSIS — M17.0 ARTHRITIS OF BOTH KNEES: ICD-10-CM

## 2020-07-07 DIAGNOSIS — N39.41 URGE INCONTINENCE OF URINE: ICD-10-CM

## 2020-07-07 PROCEDURE — 1126F PR PAIN SEVERITY QUANTIFIED, NO PAIN PRESENT: ICD-10-PCS | Mod: HCNC,S$GLB,, | Performed by: INTERNAL MEDICINE

## 2020-07-07 PROCEDURE — 3075F PR MOST RECENT SYSTOLIC BLOOD PRESS GE 130-139MM HG: ICD-10-PCS | Mod: HCNC,CPTII,S$GLB, | Performed by: INTERNAL MEDICINE

## 2020-07-07 PROCEDURE — 99214 OFFICE O/P EST MOD 30 MIN: CPT | Mod: HCNC,S$GLB,, | Performed by: INTERNAL MEDICINE

## 2020-07-07 PROCEDURE — 99214 PR OFFICE/OUTPT VISIT, EST, LEVL IV, 30-39 MIN: ICD-10-PCS | Mod: HCNC,S$GLB,, | Performed by: INTERNAL MEDICINE

## 2020-07-07 PROCEDURE — 3078F PR MOST RECENT DIASTOLIC BLOOD PRESSURE < 80 MM HG: ICD-10-PCS | Mod: HCNC,CPTII,S$GLB, | Performed by: INTERNAL MEDICINE

## 2020-07-07 PROCEDURE — 3075F SYST BP GE 130 - 139MM HG: CPT | Mod: HCNC,CPTII,S$GLB, | Performed by: INTERNAL MEDICINE

## 2020-07-07 PROCEDURE — 1126F AMNT PAIN NOTED NONE PRSNT: CPT | Mod: HCNC,S$GLB,, | Performed by: INTERNAL MEDICINE

## 2020-07-07 PROCEDURE — 1101F PT FALLS ASSESS-DOCD LE1/YR: CPT | Mod: HCNC,CPTII,S$GLB, | Performed by: INTERNAL MEDICINE

## 2020-07-07 PROCEDURE — 99999 PR PBB SHADOW E&M-EST. PATIENT-LVL IV: CPT | Mod: PBBFAC,HCNC,, | Performed by: INTERNAL MEDICINE

## 2020-07-07 PROCEDURE — 1101F PR PT FALLS ASSESS DOC 0-1 FALLS W/OUT INJ PAST YR: ICD-10-PCS | Mod: HCNC,CPTII,S$GLB, | Performed by: INTERNAL MEDICINE

## 2020-07-07 PROCEDURE — 1159F MED LIST DOCD IN RCRD: CPT | Mod: HCNC,S$GLB,, | Performed by: INTERNAL MEDICINE

## 2020-07-07 PROCEDURE — 1159F PR MEDICATION LIST DOCUMENTED IN MEDICAL RECORD: ICD-10-PCS | Mod: HCNC,S$GLB,, | Performed by: INTERNAL MEDICINE

## 2020-07-07 PROCEDURE — 99999 PR PBB SHADOW E&M-EST. PATIENT-LVL IV: ICD-10-PCS | Mod: PBBFAC,HCNC,, | Performed by: INTERNAL MEDICINE

## 2020-07-07 PROCEDURE — 3078F DIAST BP <80 MM HG: CPT | Mod: HCNC,CPTII,S$GLB, | Performed by: INTERNAL MEDICINE

## 2020-07-07 RX ORDER — CHOLESTYRAMINE 4 G/9G
4 POWDER, FOR SUSPENSION ORAL 2 TIMES DAILY
Qty: 180 PACKET | Refills: 3 | Status: SHIPPED | OUTPATIENT
Start: 2020-07-07 | End: 2021-07-07

## 2020-07-07 NOTE — PROGRESS NOTES
Subjective:       Patient ID: Lanie Cavazos is a 74 y.o. female.    Chief Complaint: Hyperlipidemia (3 mo f/u) and Hypertension    HPI  Here today for reassessment and to go over her labs.      Essential hypertension:  Watches her salt intake; blood pressures been averaging 130/78; but she needs to sit at least 4 min before she takes her blood pressure readings which would help lower this.     Morbid obesity:  Knows the benefit of regular exercise and small portion control to help her weight come down.  BMI 44.17 has gained 3 lb since 03/17/2020.     Other hyperlipidemia:  On low-fat high-fiber diet; Mevacor was reduced on 06/17 to 40 mg a day from b.i.d. due to bilateral lower extremity pain.  She is doing a lot better now.  She is on Qunol 100 mg per day and Questran daily as well as Zetia.  Cholesterol 233/triglyceride 117/HDL 45/.6.  Will increase Questran to b.i.d.     History of statin intolerance....     Impaired glucose tolerance:  Watches her carbohydrate intake but needs to exercise as tolerated more and small portions to help her weight come down.  Most recent ; will check hemoglobin A1c on follow-up      Coronary artery disease:  No complaints of chest pain, shortness of breath, or palpitations cardiologist is Dr. mckeon; on ASA 81 mg daily.     Moderate aortic stenosis:  No complaints of chest pain, shortness of breath or heart palpitations.  No syncope.  No signs of CHF exacerbation.     Arthritis bilateral knees:  Steroid injection each knee bilaterally received recently and fluid drained from left knee by Dr. Hernandez, Orthopedics at [facility]; she was given exercise literature to do by him.     Bilateral lower extremity edema:  May have been worsened by steroid injection of her knees.  Stressed the importance of maintain less than 2 g sodium per day and elevating her legs at home and using compression stockings during the day.  Suspect likely from venous insufficiency.     Urinary urge  "incontinence:  No signs of UTI but would like a urological consult for further evaluation.     Total time 10:27-11:15.  Greater than 50% of time spent in discussion, counseling, and review.  Labs were reviewed and ordered for follow-up.  Medications reviewed and addressed as well.  Multiple questions were answered regarding care as well    Review of Systems   Constitutional: Negative for appetite change and fever.   HENT: Negative for congestion, postnasal drip, rhinorrhea and sinus pressure.    Eyes: Negative for discharge and itching.   Respiratory: Negative for cough, chest tightness, shortness of breath and wheezing.    Cardiovascular: Negative for chest pain, palpitations and leg swelling.   Gastrointestinal: Negative for abdominal distention, abdominal pain, blood in stool, constipation, diarrhea, nausea and vomiting.   Endocrine: Negative for polydipsia, polyphagia and polyuria.   Genitourinary: Negative for dysuria and hematuria.   Musculoskeletal: Positive for arthralgias and myalgias.        Leg pain cleared w decrease lovastatin dosing. Buddy knee pain helped w buddy steroid injections.    Skin: Negative for rash.   Allergic/Immunologic: Negative for environmental allergies and food allergies.   Neurological: Negative for tremors, seizures and syncope.   Hematological: Negative for adenopathy. Does not bruise/bleed easily.   Psychiatric/Behavioral: Negative for dysphoric mood. The patient is not nervous/anxious.        Objective:      Vitals:    07/07/20 0950 07/07/20 1035   BP: 136/72 130/78   BP Location: Left arm    Pulse: 70    Temp: 98.1 °F (36.7 °C)    TempSrc: Temporal    Weight: 120.4 kg (265 lb 6.9 oz)    Height: 5' 5" (1.651 m)      Body mass index is 44.17 kg/m².  Wt Readings from Last 3 Encounters:   07/07/20 120.4 kg (265 lb 6.9 oz)   07/06/20 118.8 kg (262 lb)   03/17/20 118.9 kg (262 lb 2 oz)        Physical Exam  Vitals signs reviewed.   Constitutional:       General: She is not in acute " distress.     Appearance: She is well-developed. She is obese.   HENT:      Head: Normocephalic and atraumatic.   Eyes:      Extraocular Movements: Extraocular movements intact.   Neck:      Musculoskeletal: Normal range of motion and neck supple.      Thyroid: No thyromegaly.   Cardiovascular:      Rate and Rhythm: Normal rate and regular rhythm.      Heart sounds: Normal heart sounds. No murmur. No gallop.       Comments: HOMER 2/6 aortic and pulmonic; hx of mod AS.  Pulmonary:      Effort: Pulmonary effort is normal. No respiratory distress.      Breath sounds: Normal breath sounds. No wheezing or rales.   Abdominal:      General: Bowel sounds are normal. There is no distension.      Palpations: Abdomen is soft.      Tenderness: There is no abdominal tenderness. There is no guarding or rebound.   Musculoskeletal: Normal range of motion.      Comments: jony LE edema 3+ on RLE w 3-4+ LLE edema; spider veins jony w no calf tenderness to palp.    Lymphadenopathy:      Cervical: No cervical adenopathy.   Skin:     Findings: No rash.   Neurological:      Mental Status: She is alert and oriented to person, place, and time.      Comments: Moves all 4 extremities fine.   Psychiatric:         Behavior: Behavior normal.         Thought Content: Thought content normal.         Assessment:       1. Benign essential HTN    2. Other hyperlipidemia    3. Statin intolerance    4. Coronary artery disease involving native coronary artery of native heart without angina pectoris    5. Impaired glucose tolerance    6. Arthritis of both knees    7. Morbid obesity    8. Bilateral lower extremity edema    9. Urge incontinence of urine        Plan:       Benign essential HTN: Maintain < 2 Gm Na a day diet, and monitor BP at home; keep a log. Same treatment; sit 4-5 minutes before checking her BP.     Other hyperlipidemia: Maintain low fat high fiber diet, exercise regularly. Weight reduction where indicated. Increase Questran to 1 scoop 2x a  day; adhere to diet a lot better; light exercise w weight reduction.  Continue Zetia 10 mg daily and lovastatin at 40 mg daily  -     cholestyramine (QUESTRAN) 4 gram packet; Take 1 packet (4 g total) by mouth 2 (two) times daily.  Dispense: 180 packet; Refill: 3  -     Comprehensive metabolic panel; Future; Expected date: 07/07/2020  -     Lipid Panel; Future; Expected date: 07/07/2020    Statin intolerance: decrease in lovastatin to 40 mg a day eliminated jony leg pain.   -     Comprehensive metabolic panel; Future; Expected date: 07/07/2020  -     Lipid Panel; Future; Expected date: 07/07/2020    Coronary artery disease involving native coronary artery of native heart without angina pectoris: CAD has been stable without any chest pain, shortness of breath, or palpitations. Keep follow up with cardiologist as directed. Sees Dr Rachel as cardiologist.   -     cholestyramine (QUESTRAN) 4 gram packet; Take 1 packet (4 g total) by mouth 2 (two) times daily.  Dispense: 180 packet; Refill: 3    Impaired glucose tolerance; Exercise recommended with weight reduction and low carb diet; we'll follow hemoglobin A1c's with you periodically.  Most recent .  Hemoglobin A1c with follow-up labs  -     Hemoglobin A1C; Future; Expected date: 07/07/2020  -     Comprehensive metabolic panel; Future; Expected date: 07/07/2020    Arthritis of bilateral knees:  Recent steroid injections bilateral knees and fluid drained from left knee by Dr. kim and that she Orthopedics at [facility].  Given exercise literature to do by him.  May have caused some weight gain and contributed towards her bilateral lower extremity edema    Morbid obesity: Caloric restriction w regular exercise and weight reduction. Needs light exercise as tolerated and smaller portions to help weight reduction    Bilateral lower extremity edema:  May have been worsened by recent steroid injections of her knees. Maintain less than 2 g sodium diet; elevate lower  extremities at home; use compression stockings if possible.  On HCTZ 12.5 mg daily.    Urge incontinence leakage: desires urology consult; recommend Dr Marquez for further evaluation urge incontinence. No UTI sx's.

## 2020-07-07 NOTE — PROCEDURES
Large Joint Aspiration/Injection: bilateral knee    Date/Time: 7/6/2020 11:30 AM  Performed by: Wisam Hernandez MD  Authorized by: Wisam Hernandez MD     Consent Done?:  Yes (Verbal)  Indications:  Pain  Site marked: the procedure site was marked    Timeout: prior to procedure the correct patient, procedure, and site was verified    Prep: patient was prepped and draped in usual sterile fashion      Local anesthesia used?: Yes    Anesthesia:  Local infiltration  Local anesthetic:  Lidocaine 1% without epinephrine  Anesthetic total (ml):  4      Details:  Needle Size:  18 G  Ultrasonic Guidance for needle placement?: Yes    Images are saved and documented.  Approach: Needle in plane, lateral to medial.  Location:  Knee  Laterality:  Bilateral  Site:  Bilateral knee  Medications (Right):  40 mg triamcinolone acetonide 40 mg/mL  Aspirate (Right) amount (mL):  14  Aspirate (Right):  Serous  Medications (Left):  40 mg triamcinolone acetonide 40 mg/mL  Patient tolerance:  Patient tolerated the procedure well with no immediate complications     Ultrasound guidance was used for correct needle placement, the images were saved will be uploaded to EMR.

## 2020-07-07 NOTE — PATIENT INSTRUCTIONS
Benign essential HTN: Maintain < 2 Gm Na a day diet, and monitor BP at home; keep a log. Same treatment; sit 4-5 minutes before checking her BP.     Other hyperlipidemia: Maintain low fat high fiber diet, exercise regularly. Weight reduction where indicated. Increase Questran to 1 scoop 2x a day; adhere to diet a lot better; light exercise w weight reduction  -     cholestyramine (QUESTRAN) 4 gram packet; Take 1 packet (4 g total) by mouth 2 (two) times daily.  Dispense: 180 packet; Refill: 3  -     Comprehensive metabolic panel; Future; Expected date: 07/07/2020  -     Lipid Panel; Future; Expected date: 07/07/2020    Statin intolerance: decrease in lovastatin to 40 mg a day eliminated jony leg pain.   -     Comprehensive metabolic panel; Future; Expected date: 07/07/2020  -     Lipid Panel; Future; Expected date: 07/07/2020    Coronary artery disease involving native coronary artery of native heart without angina pectoris: CAD has been stable without any chest pain, shortness of breath, or palpitations. Keep follow up with cardiologist as directed. Sees Dr Rachel as cardiologist.   -     cholestyramine (QUESTRAN) 4 gram packet; Take 1 packet (4 g total) by mouth 2 (two) times daily.  Dispense: 180 packet; Refill: 3    Impaired glucose tolerance; Exercise recommended with weight reduction and low carb diet; we'll follow hemoglobin A1c's with you periodically.  -     Hemoglobin A1C; Future; Expected date: 07/07/2020  -     Comprehensive metabolic panel; Future; Expected date: 07/07/2020    Morbid obesity: Caloric restriction w regular exercise and weight reduction. Needs light exercise as tolerated and smaller portions to help weight reduction    Urge incontinence leakage: desires urology consult; recommend Dr Marquez for further evaluation urge incontinence. No UTI sx's.

## 2020-07-18 VITALS
HEIGHT: 65 IN | TEMPERATURE: 98 F | SYSTOLIC BLOOD PRESSURE: 130 MMHG | HEART RATE: 70 BPM | BODY MASS INDEX: 44.22 KG/M2 | WEIGHT: 265.44 LBS | DIASTOLIC BLOOD PRESSURE: 78 MMHG

## 2020-07-29 ENCOUNTER — OFFICE VISIT (OUTPATIENT)
Dept: PHYSICAL MEDICINE AND REHAB | Facility: CLINIC | Age: 75
End: 2020-07-29
Payer: MEDICARE

## 2020-07-29 VITALS — BODY MASS INDEX: 44.15 KG/M2 | WEIGHT: 265 LBS | HEIGHT: 65 IN

## 2020-07-29 DIAGNOSIS — M76.32 IT BAND SYNDROME, LEFT: Primary | ICD-10-CM

## 2020-07-29 DIAGNOSIS — M25.562 LEFT LATERAL KNEE PAIN: ICD-10-CM

## 2020-07-29 PROCEDURE — 99999 PR PBB SHADOW E&M-EST. PATIENT-LVL IV: CPT | Mod: PBBFAC,HCNC,, | Performed by: PHYSICAL MEDICINE & REHABILITATION

## 2020-07-29 PROCEDURE — 1101F PT FALLS ASSESS-DOCD LE1/YR: CPT | Mod: HCNC,CPTII,S$GLB, | Performed by: PHYSICAL MEDICINE & REHABILITATION

## 2020-07-29 PROCEDURE — 99213 OFFICE O/P EST LOW 20 MIN: CPT | Mod: 25,HCNC,GC,S$GLB | Performed by: PHYSICAL MEDICINE & REHABILITATION

## 2020-07-29 PROCEDURE — 76942 ECHO GUIDE FOR BIOPSY: CPT | Mod: HCNC,S$GLB,, | Performed by: PHYSICAL MEDICINE & REHABILITATION

## 2020-07-29 PROCEDURE — 20551 NJX 1 TENDON ORIGIN/INSJ: CPT | Mod: HCNC,S$GLB,, | Performed by: PHYSICAL MEDICINE & REHABILITATION

## 2020-07-29 PROCEDURE — 99213 PR OFFICE/OUTPT VISIT, EST, LEVL III, 20-29 MIN: ICD-10-PCS | Mod: 25,HCNC,GC,S$GLB | Performed by: PHYSICAL MEDICINE & REHABILITATION

## 2020-07-29 PROCEDURE — 1125F AMNT PAIN NOTED PAIN PRSNT: CPT | Mod: HCNC,S$GLB,, | Performed by: PHYSICAL MEDICINE & REHABILITATION

## 2020-07-29 PROCEDURE — 1159F MED LIST DOCD IN RCRD: CPT | Mod: HCNC,S$GLB,, | Performed by: PHYSICAL MEDICINE & REHABILITATION

## 2020-07-29 PROCEDURE — 1101F PR PT FALLS ASSESS DOC 0-1 FALLS W/OUT INJ PAST YR: ICD-10-PCS | Mod: HCNC,CPTII,S$GLB, | Performed by: PHYSICAL MEDICINE & REHABILITATION

## 2020-07-29 PROCEDURE — 1125F PR PAIN SEVERITY QUANTIFIED, PAIN PRESENT: ICD-10-PCS | Mod: HCNC,S$GLB,, | Performed by: PHYSICAL MEDICINE & REHABILITATION

## 2020-07-29 PROCEDURE — 20551 TENDON ORIGIN: ICD-10-PCS | Mod: HCNC,S$GLB,, | Performed by: PHYSICAL MEDICINE & REHABILITATION

## 2020-07-29 PROCEDURE — 1159F PR MEDICATION LIST DOCUMENTED IN MEDICAL RECORD: ICD-10-PCS | Mod: HCNC,S$GLB,, | Performed by: PHYSICAL MEDICINE & REHABILITATION

## 2020-07-29 PROCEDURE — 76942 TENDON ORIGIN: ICD-10-PCS | Mod: HCNC,S$GLB,, | Performed by: PHYSICAL MEDICINE & REHABILITATION

## 2020-07-29 PROCEDURE — 99999 PR PBB SHADOW E&M-EST. PATIENT-LVL IV: ICD-10-PCS | Mod: PBBFAC,HCNC,, | Performed by: PHYSICAL MEDICINE & REHABILITATION

## 2020-07-29 RX ORDER — BETAMETHASONE SODIUM PHOSPHATE AND BETAMETHASONE ACETATE 3; 3 MG/ML; MG/ML
6 INJECTION, SUSPENSION INTRA-ARTICULAR; INTRALESIONAL; INTRAMUSCULAR; SOFT TISSUE
Status: DISCONTINUED | OUTPATIENT
Start: 2020-07-29 | End: 2020-07-29 | Stop reason: HOSPADM

## 2020-07-29 RX ADMIN — BETAMETHASONE SODIUM PHOSPHATE AND BETAMETHASONE ACETATE 6 MG: 3; 3 INJECTION, SUSPENSION INTRA-ARTICULAR; INTRALESIONAL; INTRAMUSCULAR; SOFT TISSUE at 02:07

## 2020-07-29 NOTE — PROCEDURES
Tendon Origin    Date/Time: 7/29/2020 2:30 PM  Performed by: Wisam Hernandez MD  Authorized by: Wisam Hernandez MD     Consent Done?:  Yes (Verbal)  Timeout: prior to procedure the correct patient, procedure, and site was verified    Indications:  Pain  Site marked: the procedure site was marked    Timeout: prior to procedure the correct patient, procedure, and site was verified    Location: left distal IT band.  Prep: patient was prepped and draped in usual sterile fashion    Ultrasonic Guidance for Needle Placement?: Yes    Needle size:  25 G  Approach: Needle in plane, proximal to distal.  Medications:  6 mg betamethasone acetate-betamethasone sodium phosphate 6 mg/mL  Patient tolerance:  Patient tolerated the procedure well with no immediate complications   Ultrasound guidance was used for correct needle placement, the images were saved will be uploaded to EMR.

## 2020-07-29 NOTE — PROGRESS NOTES
[facility]    CHIEF COMPLAINT:   Chief Complaint   Patient presents with    Knee Pain     L Knee     HISTORY OF PRESENT ILLNESS: Lanie Cavazos is a 74 y.o. female who presents in follow-up of bilateral knee pain which came on gradually about 2 months ago. Her pain is aching in nature, primarily posterolateral with radiation anteriorly, and is worst when standing up from a seated position or going up steps. She denies any recent activity changes or injuries. She denies any popping, grinding, clicking, or instability.She has tried icy hot, heat, ice, and tylenol which each provide mild relief.   At last visit we aspirated 14 ml of serous fluid from the left knee and injected corticosteroid. She reports resolution of the right knee pain but only 1-2 days of relief in the left knee. She describes pain of the same severity and character as previously in the left knee. She has been doing the exercises as directed at last visit and takes 1-2 tylenol daily for pain with some relief.     Review of Systems   Constitutional: Negative for fever.   HENT: Negative for drooling.    Eyes: Negative for discharge.   Respiratory: Negative for choking.    Cardiovascular: Negative for chest pain.   Genitourinary: Negative for flank pain.   Skin: Negative for wound.   Allergic/Immunologic: Negative for immunocompromised state.   Neurological: Negative for tremors and syncope.   Psychiatric/Behavioral: Negative for behavioral problems.     Past Medical History:   Past Medical History:   Diagnosis Date    Carotid artery stenosis     Coronary artery disease     Hyperlipidemia     Hypertension     Obesity     Paroxysmal supraventricular tachycardia        Past Surgical History:   Past Surgical History:   Procedure Laterality Date    APPENDECTOMY      BREAST BIOPSY Left     negative  needle asp  by Dr Olmedo    CARPAL TUNNEL RELEASE      CATARACT EXTRACTION BILATERAL W/ ANTERIOR VITRECTOMY      COLONOSCOPY N/A  12/18/2015    Procedure: COLONOSCOPY;  Surgeon: Timothy Syed Jr., MD;  Location: [facility];  Service: Endoscopy;  Laterality: N/A;repeatb in 3 yrs. Benign polyps w possible adenomatous change in transverse colon polyp..    EYE SURGERY      HAND SURGERY  01/01/2001    HYSTERECTOMY  1979    without BSO.    toenail extraction Right 2016    4th toe.       Family History:   Family History   Problem Relation Age of Onset    Cancer Mother     Cancer Father     COPD Son     Ovarian cancer Neg Hx     Breast cancer Neg Hx        Medications:   Current Outpatient Medications on File Prior to Visit   Medication Sig Dispense Refill    acetaminophen (TYLENOL EXTRA STRENGTH) 500 MG tablet Take 500 mg by mouth 3 (three) times daily as needed for Pain.      ascorbic acid, vitamin C, (VITAMIN C) 500 MG tablet Take 500 mg by mouth once daily.      aspirin (ECOTRIN) 81 MG EC tablet Take 81 mg by mouth once daily. Three times a week Monday, Wednesday, Friday.      baclofen (LIORESAL) 10 MG tablet Use baclofen 10 mg po TID as needed for pain. 30 tablet 0    calcium-vitamin D 500-125 mg-unit tablet Take 1 tablet by mouth once daily.        cetirizine (ZYRTEC) 10 MG tablet Take 10 mg by mouth once daily.      cholestyramine (QUESTRAN) 4 gram packet Take 1 packet (4 g total) by mouth 2 (two) times daily. 180 packet 3    cholestyramine, with sugar, 4 gram Powd 4 g or 1 scoop to be mixed in 8 oz water daily; if tolerated after 1 week increase to 2x a day. 378 g 2    ezetimibe (ZETIA) 10 mg tablet Take 1 tablet (10 mg total) by mouth every evening. 90 tablet 1    GLUCOSAMINE HCL/CHONDR VILLASENOR A NA (OSTEO BI-FLEX ORAL) Take by mouth once daily.      hydroCHLOROthiazide (HYDRODIURIL) 12.5 MG Tab Take 1 tablet (12.5 mg total) by mouth once daily. 90 tablet 1    lovastatin (MEVACOR) 40 MG tablet TAKE 40 MG ORALLY EACH MORNING AND EVENING, FOR TOTAL OF 80 MG DAILY. (Patient taking differently: Take 20 mg by mouth once daily. TAKE  40 MG ORALLY EACH MORNING AND EVENING, FOR TOTAL OF 80 MG DAILY.) 180 tablet 1    methylcellulose, laxative, (CITRUCEL) 500 mg Tab Take by mouth once daily.      metoprolol succinate (TOPROL-XL) 50 MG 24 hr tablet Take 1 tablet (50 mg total) by mouth once daily. 90 tablet 1    multivitamin (MULTIVITAMIN) per tablet Take 1 tablet by mouth once daily.        mv-min-FA-D3-om3-dha-epa-fish (CARDIAMIN) 200 mcg-500 unit-200 mg Cap Take 1 tablet by mouth once daily.       nutritional supplement-fiber Liqd Take by mouth.      nutritional supplements Liqd Take by mouth.      nystatin (MYCOSTATIN) cream Apply topically 2 (two) times daily. 30 g 2    telmisartan (MICARDIS) 40 MG Tab Take 1 tablet (40 mg total) by mouth once daily. 90 tablet 1    terconazole (TERAZOL 7) 0.4 % Crea Place 1 applicator vaginally every evening. 45 g 4    triamcinolone (NASACORT) 55 mcg nasal inhaler 2 sprays by Nasal route once daily.      UBIDECARENONE (COENZYME Q10) 100 mg Tab Take 100 mg by mouth once daily.       vitamin E 400 UNIT capsule Take 400 Units by mouth once daily.      VITAMIN E/FLAXSEED OIL (FLAXSEED OIL-VITAMIN E) 1,000-1 mg-unit Cap Take 1,000 mg by mouth once daily.         No current facility-administered medications on file prior to visit.        Allergies:   Review of patient's allergies indicates:   Allergen Reactions    Crestor [rosuvastatin]      Muscle ache    Lipitor [atorvastatin]      Muscle ache    Pravachol [pravastatin]      Muscle ache    Sulfa (sulfonamide antibiotics) Swelling    Welchol [colesevelam]      Muscle ache    Zocor [simvastatin]      Muscle ache    Influenza virus vaccines Swelling and Rash     2014 flu vaccination       Social History:   Social History     Socioeconomic History    Marital status:      Spouse name: Not on file    Number of children: Not on file    Years of education: Not on file    Highest education level: Not on file   Occupational History     "Occupation: retired .   Social Needs    Financial resource strain: Not on file    Food insecurity     Worry: Not on file     Inability: Not on file    Transportation needs     Medical: Not on file     Non-medical: Not on file   Tobacco Use    Smoking status: Never Smoker    Smokeless tobacco: Never Used   Substance and Sexual Activity    Alcohol use: No     Comment: rarely.    Drug use: No    Sexual activity: Never   Lifestyle    Physical activity     Days per week: Not on file     Minutes per session: Not on file    Stress: Not on file   Relationships    Social connections     Talks on phone: Not on file     Gets together: Not on file     Attends Worship service: Not on file     Active member of club or organization: Not on file     Attends meetings of clubs or organizations: Not on file     Relationship status: Not on file   Other Topics Concern    Not on file   Social History Narrative    Not on file     PHYSICAL EXAMINATION:   General    Vitals:    07/29/20 1440   Weight: 120.2 kg (265 lb)   Height: 5' 5" (1.651 m)     Constitutional: Oriented to person, place, and time. No apparent distress. Pleasant.  HENT:   Head: Normocephalic and atraumatic.   Eyes: Right eye exhibits no discharge. Left eye exhibits no discharge. No scleral icterus.   Pulmonary/Chest: Effort normal. No respiratory distress.   Abdominal: There is no guarding.   Neurological: Alert and oriented to person, place, and time.   Psychiatric: Behavior is normal.   Right Knee Exam     Range of Motion   Extension: 0   Flexion: 110     Tests   Simi:  Medial - negative Lateral - negative  Varus: negative Valgus: negative  Lachman:  Anterior - negative      Drawer:  Posterior - negative  Patellar apprehension: negative    Other   Erythema: absent  Scars: absent  Sensation: normal  Swelling: none  Effusion: no effusion present      Left Knee Exam     Tenderness   The patient is experiencing tenderness in the lateral joint line " (Gerdy's tubercle and lateral femoarl condyle tenderness).    Range of Motion   Extension: 0   Flexion: 100 (limited by pain)     Tests   Simi:  Medial - negative Lateral - negative  Varus: negative Valgus: negative  Lachman:  Anterior - negative      Drawer:  Posterior - negative  Patellar apprehension: negative    Other   Erythema: absent  Scars: absent  Sensation: normal  Effusion: no effusion present    Comments:  Negative Tib-Fib distraction        INSPECTION: There is no ecchymoses, erythema of the left knee.  GAIT/DYNAMIC: Antalgic.    Imaging  Bilateral knee X-ray 7/6/20  Mild degenerative changes bilaterally    Data Reviewed: X-ray    Supportive Actions: Independent visualization of images or test specimens    ASSESSMENT:   Left knee pain  Bilateral knee osteoarthritis    PLAN:   1. She underwent left knee aspiration and bilateral knee corticosteroid injections on 7/6/20 with resolution of the right knee pain and 1-2 days of relief in the left knee. Discussed that there is the possibility of soft tissue pathology such as meniscal tear however intraarticular steroid should have a more prolonged benefit even in that scenario. She does have some tenderness over the distal IT band consistent with IT band syndrome.    2. We discussed that as she didn't get significant relief from intraarticular steroid it's unlikely that HA injection would provide much benefit. Other options include MRI and RFA of the knee. She would like to avoid MRI at this time as she gets claustrophobic and can't tolerate it even with anxiolytics.     3. Ultrasound-guided corticosteroid injection at the distal ITB (see procedure note)     4. Prescribed compounded pain cream. Recommended continuing home exercise program to focus on stretching and strengthening of the muscles surrounding the knee.    5. RTC as needed. I encouraged her to reach out to us if any questions or issues arise. If her pain persists we will consider left knee MRI.      The above note was completed, in part, with the aid of Dragon dictation software/hardware. Translation errors may be present.

## 2020-08-06 ENCOUNTER — OFFICE VISIT (OUTPATIENT)
Dept: PHYSICAL MEDICINE AND REHAB | Facility: CLINIC | Age: 75
End: 2020-08-06
Payer: MEDICARE

## 2020-08-06 VITALS — WEIGHT: 265 LBS | BODY MASS INDEX: 44.15 KG/M2 | HEIGHT: 65 IN

## 2020-08-06 DIAGNOSIS — M25.562 CHRONIC PAIN OF LEFT KNEE: ICD-10-CM

## 2020-08-06 DIAGNOSIS — G89.29 CHRONIC PAIN OF LEFT KNEE: ICD-10-CM

## 2020-08-06 DIAGNOSIS — M17.12 PRIMARY OSTEOARTHRITIS OF LEFT KNEE: Primary | ICD-10-CM

## 2020-08-06 PROCEDURE — 1125F AMNT PAIN NOTED PAIN PRSNT: CPT | Mod: HCNC,S$GLB,, | Performed by: PHYSICAL MEDICINE & REHABILITATION

## 2020-08-06 PROCEDURE — 99213 OFFICE O/P EST LOW 20 MIN: CPT | Mod: 25,HCNC,GC,S$GLB | Performed by: PHYSICAL MEDICINE & REHABILITATION

## 2020-08-06 PROCEDURE — 20611 LARGE JOINT ASPIRATION/INJECTION: L KNEE: ICD-10-PCS | Mod: HCNC,LT,S$GLB, | Performed by: PHYSICAL MEDICINE & REHABILITATION

## 2020-08-06 PROCEDURE — 1101F PR PT FALLS ASSESS DOC 0-1 FALLS W/OUT INJ PAST YR: ICD-10-PCS | Mod: HCNC,CPTII,S$GLB, | Performed by: PHYSICAL MEDICINE & REHABILITATION

## 2020-08-06 PROCEDURE — 1125F PR PAIN SEVERITY QUANTIFIED, PAIN PRESENT: ICD-10-PCS | Mod: HCNC,S$GLB,, | Performed by: PHYSICAL MEDICINE & REHABILITATION

## 2020-08-06 PROCEDURE — 1159F MED LIST DOCD IN RCRD: CPT | Mod: HCNC,S$GLB,, | Performed by: PHYSICAL MEDICINE & REHABILITATION

## 2020-08-06 PROCEDURE — 99213 PR OFFICE/OUTPT VISIT, EST, LEVL III, 20-29 MIN: ICD-10-PCS | Mod: 25,HCNC,GC,S$GLB | Performed by: PHYSICAL MEDICINE & REHABILITATION

## 2020-08-06 PROCEDURE — 1101F PT FALLS ASSESS-DOCD LE1/YR: CPT | Mod: HCNC,CPTII,S$GLB, | Performed by: PHYSICAL MEDICINE & REHABILITATION

## 2020-08-06 PROCEDURE — 99999 PR PBB SHADOW E&M-EST. PATIENT-LVL IV: CPT | Mod: PBBFAC,HCNC,, | Performed by: PHYSICAL MEDICINE & REHABILITATION

## 2020-08-06 PROCEDURE — 20611 DRAIN/INJ JOINT/BURSA W/US: CPT | Mod: HCNC,LT,S$GLB, | Performed by: PHYSICAL MEDICINE & REHABILITATION

## 2020-08-06 PROCEDURE — 1159F PR MEDICATION LIST DOCUMENTED IN MEDICAL RECORD: ICD-10-PCS | Mod: HCNC,S$GLB,, | Performed by: PHYSICAL MEDICINE & REHABILITATION

## 2020-08-06 PROCEDURE — 99999 PR PBB SHADOW E&M-EST. PATIENT-LVL IV: ICD-10-PCS | Mod: PBBFAC,HCNC,, | Performed by: PHYSICAL MEDICINE & REHABILITATION

## 2020-08-06 NOTE — LETTER
August 6, 2020      Rolf Bob MD  [address]  [place] [address]           [facility]  [address] [facility] [address]  Phone: [phone]  Fax: [phone]          Patient: Lanie Cavazos   MR Number: [id number]   YOB: 1945   Date of Visit: 8/6/2020       Dear Dr. Rolf Bob:    Thank you for referring Lanie Cavazos to me for evaluation. Attached you will find relevant portions of my assessment and plan of care.    If you have questions, please do not hesitate to call me. I look forward to following Lanie Cavazos along with you.    Sincerely,    Wisam Hernandez MD    Enclosure  CC:  No Recipients    If you would like to receive this communication electronically, please contact [email or web address] or [phone] to request more information on [employer] access.    For providers and/or their staff who would like to refer a patient to [facility], please contact us through our one-stop-shop provider referral line, [facility], at [phone].    If you feel you have received this communication in error or would no longer like to receive these types of communications, please e-mail [email or web address]

## 2020-08-06 NOTE — PROGRESS NOTES
[facility]    CHIEF COMPLAINT:   Chief Complaint   Patient presents with    Knee Pain     Left     HISTORY OF PRESENT ILLNESS: Lanie Cavazos is a 74 y.o. female who presents in follow-up of bilateral knee pain. I saw her twice last month - left knee aspiration and bilateral knee corticosteroid injections on 7/6/20 with resolution of the right knee pain and 1-2 days of relief in the left knee. Most recently saw her last Wednesday, 7/29 where her exam was consistent with IT band syndrome. Distal ITB injection was performed and she reports relief from her pain along the lateral aspect of her left knee, but she continues to have pain along the anteromedial knee. Also reports persistent proximal anteromedial lower leg swelling. Her pain is worse when standing up from a seated position or going up steps. She denies any popping, grinding, clicking, or instability.     Review of Systems   Constitutional: Negative for fever.   HENT: Negative for drooling.    Eyes: Negative for discharge.   Respiratory: Negative for choking.    Cardiovascular: Negative for chest pain.   Genitourinary: Negative for flank pain.   Skin: Negative for wound.   Allergic/Immunologic: Negative for immunocompromised state.   Neurological: Negative for tremors and syncope.   Psychiatric/Behavioral: Negative for behavioral problems.     Past Medical History:   Past Medical History:   Diagnosis Date    Carotid artery stenosis     Coronary artery disease     Hyperlipidemia     Hypertension     Obesity     Paroxysmal supraventricular tachycardia        Past Surgical History:   Past Surgical History:   Procedure Laterality Date    APPENDECTOMY      BREAST BIOPSY Left     negative  needle asp  by Dr Olmedo    CARPAL TUNNEL RELEASE      CATARACT EXTRACTION BILATERAL W/ ANTERIOR VITRECTOMY      COLONOSCOPY N/A 12/18/2015    Procedure: COLONOSCOPY;  Surgeon: Timothy Syed Jr., MD;  Location: [facility];  Service: Endoscopy;   Laterality: N/A;repeatb in 3 yrs. Benign polyps w possible adenomatous change in transverse colon polyp..    EYE SURGERY      HAND SURGERY  01/01/2001    HYSTERECTOMY  1979    without BSO.    toenail extraction Right 2016    4th toe.       Family History:   Family History   Problem Relation Age of Onset    Cancer Mother     Cancer Father     COPD Son     Ovarian cancer Neg Hx     Breast cancer Neg Hx        Medications:   Current Outpatient Medications on File Prior to Visit   Medication Sig Dispense Refill    acetaminophen (TYLENOL EXTRA STRENGTH) 500 MG tablet Take 500 mg by mouth 3 (three) times daily as needed for Pain.      ascorbic acid, vitamin C, (VITAMIN C) 500 MG tablet Take 500 mg by mouth once daily.      aspirin (ECOTRIN) 81 MG EC tablet Take 81 mg by mouth once daily. Three times a week Monday, Wednesday, Friday.      baclofen (LIORESAL) 10 MG tablet Use baclofen 10 mg po TID as needed for pain. 30 tablet 0    calcium-vitamin D 500-125 mg-unit tablet Take 1 tablet by mouth once daily.        cetirizine (ZYRTEC) 10 MG tablet Take 10 mg by mouth once daily.      cholestyramine (QUESTRAN) 4 gram packet Take 1 packet (4 g total) by mouth 2 (two) times daily. 180 packet 3    cholestyramine, with sugar, 4 gram Powd 4 g or 1 scoop to be mixed in 8 oz water daily; if tolerated after 1 week increase to 2x a day. 378 g 2    ezetimibe (ZETIA) 10 mg tablet Take 1 tablet (10 mg total) by mouth every evening. 90 tablet 1    GLUCOSAMINE HCL/CHONDR VILLASENOR A NA (OSTEO BI-FLEX ORAL) Take by mouth once daily.      hydroCHLOROthiazide (HYDRODIURIL) 12.5 MG Tab Take 1 tablet (12.5 mg total) by mouth once daily. 90 tablet 1    lovastatin (MEVACOR) 40 MG tablet TAKE 40 MG ORALLY EACH MORNING AND EVENING, FOR TOTAL OF 80 MG DAILY. (Patient taking differently: Take 20 mg by mouth once daily. TAKE 40 MG ORALLY EACH MORNING AND EVENING, FOR TOTAL OF 80 MG DAILY.) 180 tablet 1    methylcellulose, laxative,  (CITRUCEL) 500 mg Tab Take by mouth once daily.      metoprolol succinate (TOPROL-XL) 50 MG 24 hr tablet Take 1 tablet (50 mg total) by mouth once daily. 90 tablet 1    multivitamin (MULTIVITAMIN) per tablet Take 1 tablet by mouth once daily.        mv-min-FA-D3-om3-dha-epa-fish (CARDIAMIN) 200 mcg-500 unit-200 mg Cap Take 1 tablet by mouth once daily.       nutritional supplement-fiber Liqd Take by mouth.      nutritional supplements Liqd Take by mouth.      nystatin (MYCOSTATIN) cream Apply topically 2 (two) times daily. 30 g 2    telmisartan (MICARDIS) 40 MG Tab Take 1 tablet (40 mg total) by mouth once daily. 90 tablet 1    terconazole (TERAZOL 7) 0.4 % Crea Place 1 applicator vaginally every evening. 45 g 4    triamcinolone (NASACORT) 55 mcg nasal inhaler 2 sprays by Nasal route once daily.      UBIDECARENONE (COENZYME Q10) 100 mg Tab Take 100 mg by mouth once daily.       vitamin E 400 UNIT capsule Take 400 Units by mouth once daily.      VITAMIN E/FLAXSEED OIL (FLAXSEED OIL-VITAMIN E) 1,000-1 mg-unit Cap Take 1,000 mg by mouth once daily.         No current facility-administered medications on file prior to visit.        Allergies:   Review of patient's allergies indicates:   Allergen Reactions    Crestor [rosuvastatin]      Muscle ache    Lipitor [atorvastatin]      Muscle ache    Pravachol [pravastatin]      Muscle ache    Sulfa (sulfonamide antibiotics) Swelling    Welchol [colesevelam]      Muscle ache    Zocor [simvastatin]      Muscle ache    Influenza virus vaccines Swelling and Rash     2014 flu vaccination       Social History:   Social History     Socioeconomic History    Marital status:      Spouse name: Not on file    Number of children: Not on file    Years of education: Not on file    Highest education level: Not on file   Occupational History    Occupation: retired .   Social Needs    Financial resource strain: Not on file    Food insecurity     Worry: Not  "on file     Inability: Not on file    Transportation needs     Medical: Not on file     Non-medical: Not on file   Tobacco Use    Smoking status: Never Smoker    Smokeless tobacco: Never Used   Substance and Sexual Activity    Alcohol use: No     Comment: rarely.    Drug use: No    Sexual activity: Never   Lifestyle    Physical activity     Days per week: Not on file     Minutes per session: Not on file    Stress: Not on file   Relationships    Social connections     Talks on phone: Not on file     Gets together: Not on file     Attends Gnosticist service: Not on file     Active member of club or organization: Not on file     Attends meetings of clubs or organizations: Not on file     Relationship status: Not on file   Other Topics Concern    Not on file   Social History Narrative    Not on file     PHYSICAL EXAMINATION:   General    Vitals:    08/06/20 1622   Weight: 120.2 kg (265 lb)   Height: 5' 5" (1.651 m)     Constitutional: Oriented to person, place, and time. No apparent distress. Pleasant.  HENT:   Head: Normocephalic and atraumatic.   Eyes: Right eye exhibits no discharge. Left eye exhibits no discharge. No scleral icterus.   Pulmonary/Chest: Effort normal. No respiratory distress.   Abdominal: There is no guarding.   Neurological: Alert and oriented to person, place, and time.   Psychiatric: Behavior is normal.   Right Knee Exam     Range of Motion   Extension: 0   Flexion: 110     Tests   Simi:  Medial - negative Lateral - negative  Varus: negative Valgus: negative  Lachman:  Anterior - negative      Drawer:  Posterior - negative  Patellar apprehension: negative    Other   Erythema: absent  Scars: absent  Sensation: normal  Swelling: none  Effusion: no effusion present      Left Knee Exam     Tenderness   The patient is experiencing tenderness in the medial joint line and pes anserinus.    Range of Motion   Extension: 0   Flexion: 100 (limited by pain)     Tests   Varus: negative Valgus: " negative  Lachman:  Anterior - negative      Drawer:  Posterior - negative  Patellar apprehension: negative    Other   Erythema: absent  Scars: absent  Sensation: normal  Swelling: mild  Effusion: effusion present        INSPECTION: There is no ecchymoses, erythema, excess warmth of the left knee.  GAIT/DYNAMIC: Antalgic but can ambulate without assistance.    Imaging  Bilateral knee X-ray 7/6/20  Mild degenerative changes bilaterally    Data Reviewed: X-ray    Supportive Actions: Independent visualization of images or test specimens    ASSESSMENT:   Encounter Diagnoses   Name Primary?    Primary osteoarthritis of left knee Yes    Chronic pain of left knee      PLAN:     1.  We discussed that her pain is secondary to osteoarthritis.  We discussed several options in depth.  We discussed that knee arthroplasty may be the likely necessary course of action at some point in the future.  She is not ready for this mentally, and I do believe she needs to lose weight before becoming an ideal candidate.  We discussed that would be too soon to repeat injection of corticosteroid into the joint considering she had 1 just a few weeks ago.  She would also like to minimize the use of oral medicines especially considering she is worried about potential side effects from the topical compound pain cream prescribed last visit.  We discussed the genicular radiofrequency ablation procedure.  I do believe she represents a good candidate for this procedure which may give her several months of pain reduction, allowing her to be more physically active, lose weight, and become a better candidate for knee arthroplasty.  She is interested in this but would like to do more research.  She was issued a brochure and will let us know if she wants to get scheduled for this.  Diagnostic ultrasound of the left knee today did reveal the presence of a significant effusion.  She wished to aspirate in the hopes of some decompression and pain relief, at  least temporarily.  We we discussed that we could also inject hyaluronic acid in the hopes of some reduction in inflammation and improvements in the intra-articular health.  She was in favor of this as well.    2. She elected today for a L Knee aspiration followed by a Monovisc injection. 24cc bloody fluid aspirated. See separate procedure note.     3.  She reports overall worsening of her knee pain inability to ambulate over recent weeks.  This combined with the bloody effusion, raises suspicion for more acute pathology such as insufficiency fracture.  As such,  MRI of the left knee ordered today. We will f/u with her via telephone with the results.  If MRI is negative for fracture, and she receives insufficient relief from today's procedure, we can schedule for genicular diagnostic block, under fluoroscopy due to her body habitus.    The above note was completed, in part, with the aid of Dragon dictation software/hardware. Translation errors may be present.

## 2020-08-07 ENCOUNTER — TELEPHONE (OUTPATIENT)
Dept: PHYSICAL MEDICINE AND REHAB | Facility: CLINIC | Age: 75
End: 2020-08-07

## 2020-08-07 NOTE — PROCEDURES
Large Joint Aspiration/Injection: L knee    Date/Time: 8/6/2020 3:30 PM  Performed by: Wisam Hernandez MD  Authorized by: Wisam Hernandez MD     Consent Done?:  Yes (Verbal)  Indications:  Arthritis, joint swelling and pain  Site marked: the procedure site was marked    Timeout: prior to procedure the correct patient, procedure, and site was verified    Prep: patient was prepped and draped in usual sterile fashion      Local anesthesia used?: Yes    Anesthesia:  Local infiltration  Local anesthetic:  Lidocaine 1% without epinephrine  Anesthetic total (ml):  4      Details:  Needle Size:  18 G  Ultrasonic Guidance for needle placement?: Yes    Images are saved and documented.  Approach: Needle in plane, lateral to medial.  Location:  Knee  Site:  L knee  Medications:  4 mL hyaluronate sodium, stabilized 88 mg/4 mL  Aspirate amount (mL):  24  Aspirate:  Bloody and serous  Patient tolerance:  Patient tolerated the procedure well with no immediate complications     Ultrasound guidance was used for correct needle placement, the images were saved will be uploaded to EMR.

## 2020-08-07 NOTE — TELEPHONE ENCOUNTER
----- Message from Yuli Oliveira sent at 8/7/2020 11:02 AM CDT -----  Pt called to speak with the office about her MRI please reach out to pt at [phone]

## 2020-08-17 ENCOUNTER — TELEPHONE (OUTPATIENT)
Dept: PHYSICAL MEDICINE AND REHAB | Facility: CLINIC | Age: 75
End: 2020-08-17

## 2020-08-17 NOTE — TELEPHONE ENCOUNTER
----- Message from Lashae Morin sent at 8/17/2020 12:03 PM CDT -----  Contact: self [phone]  Pt states she needs to r/s her MRI. Pt states she went to [facility] and the MRI was not open b/c she is too short. Pt states she needs a completely open MRI with her head out. Please call and advise.

## 2020-08-18 ENCOUNTER — TELEPHONE (OUTPATIENT)
Dept: PHYSICAL MEDICINE AND REHAB | Facility: CLINIC | Age: 75
End: 2020-08-18

## 2020-08-18 NOTE — TELEPHONE ENCOUNTER
----- Message from Nadeem El sent at 8/18/2020  4:28 PM CDT -----  Regarding: call back  Patient is returning your call  Please be advised    Patient can be reached at    [phone]

## 2020-08-19 ENCOUNTER — TELEPHONE (OUTPATIENT)
Dept: PHYSICAL MEDICINE AND REHAB | Facility: CLINIC | Age: 75
End: 2020-08-19

## 2020-08-19 ENCOUNTER — OFFICE VISIT (OUTPATIENT)
Dept: FAMILY MEDICINE | Facility: CLINIC | Age: 75
End: 2020-08-19
Payer: MEDICARE

## 2020-08-19 VITALS
HEIGHT: 65 IN | SYSTOLIC BLOOD PRESSURE: 142 MMHG | BODY MASS INDEX: 44.01 KG/M2 | DIASTOLIC BLOOD PRESSURE: 78 MMHG | TEMPERATURE: 99 F | WEIGHT: 264.13 LBS | OXYGEN SATURATION: 97 % | HEART RATE: 63 BPM

## 2020-08-19 DIAGNOSIS — I25.10 CORONARY ARTERY DISEASE INVOLVING NATIVE CORONARY ARTERY OF NATIVE HEART WITHOUT ANGINA PECTORIS: ICD-10-CM

## 2020-08-19 DIAGNOSIS — E66.01 MORBID OBESITY: ICD-10-CM

## 2020-08-19 DIAGNOSIS — I10 BENIGN ESSENTIAL HTN: ICD-10-CM

## 2020-08-19 DIAGNOSIS — I47.10 PSVT (PAROXYSMAL SUPRAVENTRICULAR TACHYCARDIA): ICD-10-CM

## 2020-08-19 DIAGNOSIS — E78.49 OTHER HYPERLIPIDEMIA: ICD-10-CM

## 2020-08-19 DIAGNOSIS — Z00.00 ENCOUNTER FOR PREVENTIVE HEALTH EXAMINATION: Primary | ICD-10-CM

## 2020-08-19 PROCEDURE — 99999 PR PBB SHADOW E&M-EST. PATIENT-LVL V: CPT | Mod: PBBFAC,HCNC,, | Performed by: NURSE PRACTITIONER

## 2020-08-19 PROCEDURE — 3077F SYST BP >= 140 MM HG: CPT | Mod: HCNC,CPTII,S$GLB, | Performed by: NURSE PRACTITIONER

## 2020-08-19 PROCEDURE — 3078F PR MOST RECENT DIASTOLIC BLOOD PRESSURE < 80 MM HG: ICD-10-PCS | Mod: HCNC,CPTII,S$GLB, | Performed by: NURSE PRACTITIONER

## 2020-08-19 PROCEDURE — 3078F DIAST BP <80 MM HG: CPT | Mod: HCNC,CPTII,S$GLB, | Performed by: NURSE PRACTITIONER

## 2020-08-19 PROCEDURE — G0439 PR MEDICARE ANNUAL WELLNESS SUBSEQUENT VISIT: ICD-10-PCS | Mod: HCNC,S$GLB,, | Performed by: NURSE PRACTITIONER

## 2020-08-19 PROCEDURE — G0439 PPPS, SUBSEQ VISIT: HCPCS | Mod: HCNC,S$GLB,, | Performed by: NURSE PRACTITIONER

## 2020-08-19 PROCEDURE — 3077F PR MOST RECENT SYSTOLIC BLOOD PRESSURE >= 140 MM HG: ICD-10-PCS | Mod: HCNC,CPTII,S$GLB, | Performed by: NURSE PRACTITIONER

## 2020-08-19 PROCEDURE — 99999 PR PBB SHADOW E&M-EST. PATIENT-LVL V: ICD-10-PCS | Mod: PBBFAC,HCNC,, | Performed by: NURSE PRACTITIONER

## 2020-08-19 NOTE — PATIENT INSTRUCTIONS
Counseling and Referral of Other Preventative  (Italic type indicates deductible and co-insurance are waived)    Patient Name: Lanie Cavazos  Today's Date: 8/19/2020    Health Maintenance       Date Due Completion Date    Hepatitis C Screening 1945 ---    Shingles Vaccine (1 of 2) 08/12/1994 ---    Influenza Vaccine (1) 09/01/2020 10/18/2014    Colonoscopy 12/18/2020 12/18/2015    DEXA SCAN 02/12/2021 2/12/2018    Aspirin/Antiplatelet Therapy 03/17/2021 3/17/2020    Fecal Occult Blood Test (FOBT)/[employer]Kit 03/20/2021 3/20/2020    Lipid Panel 06/10/2021 6/10/2020    TETANUS VACCINE 04/06/2029 4/6/2019        No orders of the defined types were placed in this encounter.    The following information is provided to all patients.  This information is to help you find resources for any of the problems found today that may be affecting your health:                Living healthy guide: www.[facility].[place].[facility]      Understanding Diabetes: www.diabetes.org      Eating healthy: www.cdc.gov/healthyweight      [facility] home safety checklist: www.cdc.gov/steadi/patient.html      Agency on Aging: www.goea.[place].gov      Alcoholics anonymous (AA): www.aa.org      Physical Activity: www.robyn.nih.gov/xi5vgnh      Tobacco use: www.quitwithusla.org

## 2020-08-19 NOTE — TELEPHONE ENCOUNTER
----- Message from Alice Montgomery sent at 8/19/2020  2:23 PM CDT -----  Contact: patient  Type: Needs Medical Advice  Who Called:  patient  Symptoms (please be specific):    How long has patient had these symptoms:    Pharmacy name and phone #:    Best Call Back Number: [phone]  Additional Information: requesting a call back regarding order for mri

## 2020-08-19 NOTE — PROGRESS NOTES
"  Lanie Cavazos presented for a  Medicare AWV and comprehensive Health Risk Assessment today. The following components were reviewed and updated:    · Medical history  · Family History  · Social history  · Allergies and Current Medications  · Health Risk Assessment  · Health Maintenance  · Care Team     ** See Completed Assessments for Annual Wellness Visit within the encounter summary.**       The following assessments were completed:  · Living Situation  · CAGE  · Depression Screening  · Timed Get Up and Go  · Whisper Test  · Cognitive Function Screening      · Nutrition Screening  · ADL Screening  · PAQ Screening    Vitals:    08/19/20 1121   BP: (!) 142/78   Pulse: 63   Temp: 98.7 °F (37.1 °C)   TempSrc: Oral   SpO2: 97%   Weight: 119.8 kg (264 lb 1.8 oz)   Height: 5' 5" (1.651 m)     Body mass index is 43.95 kg/m².  Physical Exam  Vitals signs reviewed.   Constitutional:       Appearance: Normal appearance.   Cardiovascular:      Rate and Rhythm: Normal rate and regular rhythm.      Heart sounds: Normal heart sounds.   Pulmonary:      Effort: Pulmonary effort is normal.      Breath sounds: Normal breath sounds.   Skin:     General: Skin is warm and dry.   Neurological:      Mental Status: She is alert and oriented to person, place, and time.           Diagnoses and health risks identified today and associated recommendations/orders:    1. Encounter for preventive health examination  Reviewed and discussed health maintenance.      2. PSVT (paroxysmal supraventricular tachycardia)  Stable- continue current treatment and follow up routinely with PCP     3. Coronary artery disease involving native coronary artery of native heart without angina pectoris  Stable- continue current treatment and follow up routinely with PCP     4. Benign essential HTN  Stable- continue current treatment and follow up routinely with PCP     5. Other hyperlipidemia  Stable- continue current treatment and follow up routinely with PCP     6. " Morbid obesity  Encouraged healthy eating, weight loss and routine exercise     Provided Lanie with a 5-10 year written screening schedule and personal prevention plan. Recommendations were developed using the USPSTF age appropriate recommendations. Education, counseling, and referrals were provided as needed. After Visit Summary printed and given to patient which includes a list of additional screenings\tests needed.    Imelda White NP

## 2020-08-20 ENCOUNTER — PATIENT MESSAGE (OUTPATIENT)
Dept: PHYSICAL MEDICINE AND REHAB | Facility: CLINIC | Age: 75
End: 2020-08-20

## 2020-08-21 ENCOUNTER — PATIENT MESSAGE (OUTPATIENT)
Dept: PHYSICAL MEDICINE AND REHAB | Facility: CLINIC | Age: 75
End: 2020-08-21

## 2020-08-27 ENCOUNTER — HOSPITAL ENCOUNTER (OUTPATIENT)
Dept: RADIOLOGY | Facility: HOSPITAL | Age: 75
Discharge: HOME | End: 2020-08-27
Attending: STUDENT IN AN ORGANIZED HEALTH CARE EDUCATION/TRAINING PROGRAM
Payer: MEDICARE

## 2020-08-27 DIAGNOSIS — G89.29 CHRONIC PAIN OF LEFT KNEE: ICD-10-CM

## 2020-08-27 DIAGNOSIS — M25.562 CHRONIC PAIN OF LEFT KNEE: ICD-10-CM

## 2020-08-27 PROCEDURE — 73721 MRI JNT OF LWR EXTRE W/O DYE: CPT | Mod: TC,HCNC,PO,LT

## 2020-08-27 PROCEDURE — 73721 MRI KNEE WITHOUT CONTRAST LEFT: ICD-10-PCS | Mod: 26,HCNC,LT, | Performed by: RADIOLOGY

## 2020-08-27 PROCEDURE — 73721 MRI JNT OF LWR EXTRE W/O DYE: CPT | Mod: 26,HCNC,LT, | Performed by: RADIOLOGY

## 2020-08-28 ENCOUNTER — TELEPHONE (OUTPATIENT)
Dept: PHYSICAL MEDICINE AND REHAB | Facility: CLINIC | Age: 75
End: 2020-08-28

## 2020-08-28 NOTE — TELEPHONE ENCOUNTER
----- Message from Wisam Hernandez MD sent at 8/27/2020  5:51 PM CDT -----  I spoke to her discussed her MRI results.  Please help her set up to see Dr. Brothers or Moe to discuss knee replacement.

## 2020-09-02 ENCOUNTER — NURSE TRIAGE (OUTPATIENT)
Dept: ADMINISTRATIVE | Facility: CLINIC | Age: 75
End: 2020-09-02

## 2020-09-02 NOTE — TELEPHONE ENCOUNTER
States the left leg gave up on her .  Pain management doctor gave her 2 shots in her left hip.  Pain 10/10.   Advised ER now.  Daughter is taking her to [facility] ER    Reason for Disposition   Unable to walk    Additional Information   Negative: Looks like a broken bone or dislocated joint (e.g., crooked or deformed)   Negative: Sounds like a life-threatening emergency to the triager   Negative: Followed a hip injury   Negative: Followed a knee injury   Negative: Followed an ankle or foot injury   Negative: Back pain radiating (shooting) into leg(s)   Negative: Foot pain is the main symptom   Negative: Ankle pain is the main symptom   Negative: Knee pain is the main symptom   Negative: Leg swelling is the main symptom   Negative: Chest pain   Negative: Difficulty breathing   Negative: Entire foot is cool or blue in comparison to other side    Protocols used: LEG PAIN-A-OH

## 2020-09-06 ENCOUNTER — PATIENT OUTREACH (OUTPATIENT)
Dept: ADMINISTRATIVE | Facility: OTHER | Age: 75
End: 2020-09-06

## 2020-09-07 NOTE — PROGRESS NOTES
LINKS immunization registry updated  Care Everywhere updated  Health Maintenance updated  Chart reviewed for overdue Proactive [facility] Encounters (SANDY) health maintenance testing (CRS, Breast Ca, Diabetic Eye Exam)   Orders entered:N/A

## 2020-09-21 DIAGNOSIS — E78.49 OTHER HYPERLIPIDEMIA: ICD-10-CM

## 2020-09-21 DIAGNOSIS — I25.10 CORONARY ARTERY DISEASE INVOLVING NATIVE CORONARY ARTERY OF NATIVE HEART WITHOUT ANGINA PECTORIS: ICD-10-CM

## 2020-09-24 RX ORDER — EZETIMIBE 10 MG/1
TABLET ORAL
Qty: 90 TABLET | Refills: 3 | Status: SHIPPED | OUTPATIENT
Start: 2020-09-24 | End: 2022-02-23 | Stop reason: SDUPTHER

## 2020-09-29 ENCOUNTER — PATIENT OUTREACH (OUTPATIENT)
Dept: ADMINISTRATIVE | Facility: HOSPITAL | Age: 75
End: 2020-09-29

## 2020-09-29 ENCOUNTER — PATIENT MESSAGE (OUTPATIENT)
Dept: OTHER | Facility: OTHER | Age: 75
End: 2020-09-29

## 2020-09-29 NOTE — PROGRESS NOTES
Chart review completed 2020.  Care Everywhere updates requested and reviewed.  Immunizations reconciled. Media reports reviewed.  Duplicate HM overrides and  orders removed.  Overdue HM topic chart audit and/or requested.  Overdue lab testing linked to upcoming lab appointments if applies.    Lab estelle, and eziCONEX reviewed.    Health Maintenance Due   Topic Date Due    Hepatitis C Screening  1945    Shingles Vaccine (1 of 2) 1994    Influenza Vaccine (1) 2020    Colonoscopy  2020

## 2020-10-06 ENCOUNTER — LAB VISIT (OUTPATIENT)
Dept: LAB | Facility: HOSPITAL | Age: 75
End: 2020-10-06
Attending: INTERNAL MEDICINE
Payer: MEDICARE

## 2020-10-06 DIAGNOSIS — Z78.9 STATIN INTOLERANCE: ICD-10-CM

## 2020-10-06 DIAGNOSIS — R73.02 IMPAIRED GLUCOSE TOLERANCE: ICD-10-CM

## 2020-10-06 DIAGNOSIS — E78.49 OTHER HYPERLIPIDEMIA: ICD-10-CM

## 2020-10-06 LAB
ALBUMIN SERPL BCP-MCNC: 3.6 G/DL (ref 3.5–5.2)
ALP SERPL-CCNC: 93 U/L (ref 55–135)
ALT SERPL W/O P-5'-P-CCNC: 12 U/L (ref 10–44)
ANION GAP SERPL CALC-SCNC: 11 MMOL/L (ref 8–16)
AST SERPL-CCNC: 15 U/L (ref 10–40)
BILIRUB SERPL-MCNC: 0.6 MG/DL (ref 0.1–1)
BUN SERPL-MCNC: 21 MG/DL (ref 8–23)
CALCIUM SERPL-MCNC: 9.4 MG/DL (ref 8.7–10.5)
CHLORIDE SERPL-SCNC: 104 MMOL/L (ref 95–110)
CHOLEST SERPL-MCNC: 210 MG/DL (ref 120–199)
CHOLEST/HDLC SERPL: 4.7 {RATIO} (ref 2–5)
CK SERPL-CCNC: 46 U/L (ref 20–180)
CO2 SERPL-SCNC: 27 MMOL/L (ref 23–29)
CREAT SERPL-MCNC: 0.7 MG/DL (ref 0.5–1.4)
EST. GFR  (AFRICAN AMERICAN): >60 ML/MIN/1.73 M^2
EST. GFR  (NON AFRICAN AMERICAN): >60 ML/MIN/1.73 M^2
ESTIMATED AVG GLUCOSE: 111 MG/DL (ref 68–131)
GLUCOSE SERPL-MCNC: 109 MG/DL (ref 70–110)
HBA1C MFR BLD HPLC: 5.5 % (ref 4–5.6)
HDLC SERPL-MCNC: 45 MG/DL (ref 40–75)
HDLC SERPL: 21.4 % (ref 20–50)
LDLC SERPL CALC-MCNC: 139.2 MG/DL (ref 63–159)
NONHDLC SERPL-MCNC: 165 MG/DL
POTASSIUM SERPL-SCNC: 3.6 MMOL/L (ref 3.5–5.1)
PROT SERPL-MCNC: 7.1 G/DL (ref 6–8.4)
SODIUM SERPL-SCNC: 142 MMOL/L (ref 136–145)
TRIGL SERPL-MCNC: 129 MG/DL (ref 30–150)

## 2020-10-06 PROCEDURE — 36415 COLL VENOUS BLD VENIPUNCTURE: CPT | Mod: PO

## 2020-10-06 PROCEDURE — 82550 ASSAY OF CK (CPK): CPT

## 2020-10-06 PROCEDURE — 83036 HEMOGLOBIN GLYCOSYLATED A1C: CPT

## 2020-10-06 PROCEDURE — 80053 COMPREHEN METABOLIC PANEL: CPT

## 2020-10-06 PROCEDURE — 80061 LIPID PANEL: CPT

## 2020-10-13 ENCOUNTER — OFFICE VISIT (OUTPATIENT)
Dept: FAMILY MEDICINE | Facility: CLINIC | Age: 75
End: 2020-10-13
Payer: MEDICARE

## 2020-10-13 VITALS
DIASTOLIC BLOOD PRESSURE: 78 MMHG | SYSTOLIC BLOOD PRESSURE: 148 MMHG | WEIGHT: 260.56 LBS | HEART RATE: 66 BPM | HEIGHT: 65 IN | OXYGEN SATURATION: 97 % | BODY MASS INDEX: 43.41 KG/M2 | TEMPERATURE: 97 F

## 2020-10-13 DIAGNOSIS — I87.2 VENOUS INSUFFICIENCY OF BOTH LOWER EXTREMITIES: ICD-10-CM

## 2020-10-13 DIAGNOSIS — M17.0 ARTHRITIS OF BOTH KNEES: ICD-10-CM

## 2020-10-13 DIAGNOSIS — E78.49 OTHER HYPERLIPIDEMIA: ICD-10-CM

## 2020-10-13 DIAGNOSIS — R73.02 IMPAIRED GLUCOSE TOLERANCE: ICD-10-CM

## 2020-10-13 DIAGNOSIS — R60.0 BILATERAL LOWER EXTREMITY EDEMA: ICD-10-CM

## 2020-10-13 DIAGNOSIS — I10 BENIGN ESSENTIAL HTN: Primary | ICD-10-CM

## 2020-10-13 DIAGNOSIS — I35.0 MODERATE AORTIC STENOSIS: ICD-10-CM

## 2020-10-13 DIAGNOSIS — I25.10 CORONARY ARTERY DISEASE INVOLVING NATIVE CORONARY ARTERY OF NATIVE HEART WITHOUT ANGINA PECTORIS: ICD-10-CM

## 2020-10-13 DIAGNOSIS — Z78.9 STATIN INTOLERANCE: ICD-10-CM

## 2020-10-13 DIAGNOSIS — E66.01 MORBID OBESITY: ICD-10-CM

## 2020-10-13 PROCEDURE — 99499 RISK ADDL DX/OHS AUDIT: ICD-10-PCS | Mod: S$GLB,,, | Performed by: INTERNAL MEDICINE

## 2020-10-13 PROCEDURE — 99214 OFFICE O/P EST MOD 30 MIN: CPT | Mod: S$GLB,,, | Performed by: INTERNAL MEDICINE

## 2020-10-13 PROCEDURE — 99214 PR OFFICE/OUTPT VISIT, EST, LEVL IV, 30-39 MIN: ICD-10-PCS | Mod: S$GLB,,, | Performed by: INTERNAL MEDICINE

## 2020-10-13 PROCEDURE — 99999 PR PBB SHADOW E&M-EST. PATIENT-LVL V: CPT | Mod: PBBFAC,,, | Performed by: INTERNAL MEDICINE

## 2020-10-13 PROCEDURE — 99499 UNLISTED E&M SERVICE: CPT | Mod: S$GLB,,, | Performed by: INTERNAL MEDICINE

## 2020-10-13 PROCEDURE — 99215 OFFICE O/P EST HI 40 MIN: CPT | Mod: PBBFAC,PN | Performed by: INTERNAL MEDICINE

## 2020-10-13 PROCEDURE — 99999 PR PBB SHADOW E&M-EST. PATIENT-LVL V: ICD-10-PCS | Mod: PBBFAC,,, | Performed by: INTERNAL MEDICINE

## 2020-10-13 RX ORDER — FUROSEMIDE 20 MG/1
TABLET ORAL
Qty: 60 TABLET | Refills: 2 | Status: SHIPPED | OUTPATIENT
Start: 2020-10-13 | End: 2020-12-06

## 2020-10-13 NOTE — PATIENT INSTRUCTIONS
Benign essential HTN; needs new BP cuff; Maintain < 2 Gm Na a day diet, and monitor BP at home; keep a log.    Other hyperlipidemia: Maintain low fat high fiber diet, exercise regularly. Weight reduction where indicated. Cont present meds and add meatamucil daily.     Statin intolerance    Coronary artery disease involving native coronary artery of native heart without angina pectoris: has been stable; sees Dr Rachel    Moderate aortic stenosis    Impaired glucose tolerance: Exercise recommended with weight reduction and low carb diet; we'll follow hemoglobin A1c's with you periodically.    Morbid obesity: Caloric restriction w regular exercise and weight reduction.    Bilateral lower extremity edema: LLE >RLE edema for 6 mos now. .Maintain less than 2 g sodium diet; elevate lower extremities at home; use compression stockings if possible.  -     US Lower Extremity Veins Bilateral; Future; Expected date: 10/13/2020  -     furosemide (LASIX) 20 MG tablet; 20 mg po BID for 5 days then 20 mg po daily.  Dispense: 60 tablet; Refill: 2  -     Basic Metabolic Panel; Future; Expected date: 10/13/2020  -     Magnesium; Future; Expected date: 10/13/2020  -     BNP; Future; Expected date: 10/13/2020  -     TSH; Future; Expected date: 10/13/2020    Venous insufficiency of both lower extremities  -     Basic Metabolic Panel; Future; Expected date: 10/13/2020  -     Magnesium; Future; Expected date: 10/13/2020  -     BNP; Future; Expected date: 10/13/2020  -     TSH; Future; Expected date: 10/13/2020

## 2020-10-13 NOTE — PROGRESS NOTES
Subjective:       Patient ID: Lanie Cavazos is a 75 y.o. female.    Chief Complaint: Follow-up (review lab results) and pt would like rx for bp cuff    HPI here today for reassessment and go over her labs.     Essential hypertension:  Needs to obtain new BP cuff as hers is outdated; to also resume monitoring her blood pressure at home and keep a log for office for review.  Blood pressure manually here is 148/78.     Other hyperlipidemia:  On low-fat high-fiber diet at least tries.  Tolerates lovastatin 40 mg b.i.d. fine; recently Cardiology Dr. Rachel has added Questran 1 pack twice a day.  Have added Zetia 10 mg per HS 1 month ago or 4 weeks prior before her blood test.  Lipid profile:  Cholesterol 210 triglyceride 129 HDL 45 and  down from 164.6.  And Metamucil daily.     History of statin intolerance.     Coronary artery disease no complaints of chest pain, shortness of breath, or palpitations.     Arthritis knees:  Sees Dr. wh[facility]; has Dr. Neff need for pain management who has injected both knees in July.     Obesity:  BMI 43.36; knows the benefit of regular exercise and small portions to help her weight come down; has lost 2 and 0.5 lb since 09/02/2020.     Impaired fasting blood sugar:  Fasting glucose minimally elevated at 109 with hemoglobin A1c normal at 5.5.  Limit her carbohydrates and exercise regularly with small portions to help her weight come down     Bilateral lower extremity edema left is greater than the right 4+; will begin Lasix 20 mg a day and ask patient to watch her salt intake closer; also to elevate her lower extremities.  Will also obtain bilateral lower extremity venous ultrasound to rule out DVT.     Total time 11:55 a.m. through 12:25 p.m..  Greater than 50% of time spent in discussion, counseling, and review.  Labs reviewed with patient and ordered for follow-up.    Review of Systems   Constitutional: Negative for appetite change and fever.   HENT: Negative for  "congestion, postnasal drip, rhinorrhea and sinus pressure.    Eyes: Negative for discharge and itching.   Respiratory: Negative for cough, chest tightness, shortness of breath and wheezing.    Cardiovascular: Negative for chest pain, palpitations and leg swelling.   Gastrointestinal: Negative for abdominal distention, abdominal pain, blood in stool, constipation, diarrhea, nausea and vomiting.   Endocrine: Negative for polydipsia, polyphagia and polyuria.   Genitourinary: Negative for dysuria and hematuria.   Musculoskeletal: Negative for arthralgias and myalgias.   Skin: Negative for rash.   Allergic/Immunologic: Negative for environmental allergies and food allergies.   Neurological: Negative for tremors, seizures and syncope.   Hematological: Negative for adenopathy. Does not bruise/bleed easily.       Objective:      Vitals:    10/13/20 1113   BP: (!) 148/78   BP Location: Right arm   Patient Position: Sitting   BP Method: Large (Manual)   Pulse: 66   Temp: 97 °F (36.1 °C)   TempSrc: Temporal   SpO2: 97%   Weight: 118.2 kg (260 lb 9.3 oz)   Height: 5' 5" (1.651 m)     Body mass index is 43.36 kg/m².  Wt Readings from Last 3 Encounters:   10/13/20 118.2 kg (260 lb 9.3 oz)   09/02/20 119.3 kg (263 lb)   08/19/20 119.8 kg (264 lb 1.8 oz)        Physical Exam  Vitals signs reviewed.   Constitutional:       Appearance: She is well-developed.   HENT:      Head: Normocephalic and atraumatic.   Neck:      Musculoskeletal: Normal range of motion and neck supple.      Thyroid: No thyromegaly.   Cardiovascular:      Rate and Rhythm: Normal rate and regular rhythm.      Heart sounds: Normal heart sounds. No murmur. No gallop.    Pulmonary:      Effort: Pulmonary effort is normal. No respiratory distress.      Breath sounds: Normal breath sounds. No wheezing or rales.   Abdominal:      General: Bowel sounds are normal. There is no distension.      Palpations: Abdomen is soft.      Tenderness: There is no abdominal " tenderness. There is no guarding or rebound.   Musculoskeletal: Normal range of motion.      Comments: 4+ jony LE edema w LLE>RLE; no calf tenderness to palp. Upper left calf tender to palp. Spider veins.    Lymphadenopathy:      Cervical: No cervical adenopathy.   Skin:     Findings: No rash.   Neurological:      Mental Status: She is alert and oriented to person, place, and time.      Comments: Moves all 4 extremities fine.   Psychiatric:         Behavior: Behavior normal.         Thought Content: Thought content normal.         Assessment:       1. Benign essential HTN    2. Other hyperlipidemia    3. Statin intolerance    4. Coronary artery disease involving native coronary artery of native heart without angina pectoris    5. Moderate aortic stenosis    6. Impaired glucose tolerance    7. Morbid obesity    8. Bilateral lower extremity edema    9. Venous insufficiency of both lower extremities    10. Arthritis of both knees        Plan:       Benign essential HTN; needs new BP cuff; Maintain < 2 Gm Na a day diet, and monitor BP at home; keep a log.  To sit for 3-4 minutes before running her blood pressure cuff    Other hyperlipidemia: Maintain low fat high fiber diet, exercise regularly. Weight reduction where indicated. Cont lovastatin 40 mg b.i.d. and Zetia 10 mg per HS, as well as Questran 1 packet twice a day and add meatamucil daily.     Statin intolerance    Coronary artery disease involving native coronary artery of native heart without angina pectoris: has been stable; sees Dr Rachel    Moderate aortic stenosis    Impaired glucose tolerance: Exercise recommended with weight reduction and low carb diet; we'll follow hemoglobin A1c's with you periodically.    Morbid obesity: Caloric restriction w regular exercise and weight reduction.    Bilateral lower extremity edema: LLE >RLE edema for 6 mos now. .Maintain less than 2 g sodium diet; elevate lower extremities at home; use compression stockings if  possible.  -     US Lower Extremity Veins Bilateral; Future; Expected date: 10/13/2020  -     furosemide (LASIX) 20 MG tablet; 20 mg po BID for 5 days then 20 mg po daily.  Dispense: 60 tablet; Refill: 2  -     Basic Metabolic Panel; Future; Expected date: 10/13/2020  -     Magnesium; Future; Expected date: 10/13/2020  -     BNP; Future; Expected date: 10/13/2020  -     TSH; Future; Expected date: 10/13/2020    Venous insufficiency of both lower extremities  -     Basic Metabolic Panel; Future; Expected date: 10/13/2020  -     Magnesium; Future; Expected date: 10/13/2020  -     BNP; Future; Expected date: 10/13/2020  -     TSH; Future; Expected date: 10/13/2020    Arthritis of both knees:  Keep her follow-up with her Orthopedics Dr. whiting as directed; keep her follow-up with pain management physician Dr. Torres as well; continue exercise with weight reduction attempts

## 2020-11-03 ENCOUNTER — LAB VISIT (OUTPATIENT)
Dept: LAB | Facility: HOSPITAL | Age: 75
End: 2020-11-03
Attending: INTERNAL MEDICINE
Payer: MEDICARE

## 2020-11-03 DIAGNOSIS — I87.2 VENOUS INSUFFICIENCY OF BOTH LOWER EXTREMITIES: ICD-10-CM

## 2020-11-03 DIAGNOSIS — R60.0 BILATERAL LOWER EXTREMITY EDEMA: ICD-10-CM

## 2020-11-03 LAB
ANION GAP SERPL CALC-SCNC: 14 MMOL/L (ref 8–16)
BNP SERPL-MCNC: 100 PG/ML (ref 0–99)
BUN SERPL-MCNC: 30 MG/DL (ref 8–23)
CALCIUM SERPL-MCNC: 10 MG/DL (ref 8.7–10.5)
CHLORIDE SERPL-SCNC: 102 MMOL/L (ref 95–110)
CO2 SERPL-SCNC: 27 MMOL/L (ref 23–29)
CREAT SERPL-MCNC: 0.7 MG/DL (ref 0.5–1.4)
EST. GFR  (AFRICAN AMERICAN): >60 ML/MIN/1.73 M^2
EST. GFR  (NON AFRICAN AMERICAN): >60 ML/MIN/1.73 M^2
GLUCOSE SERPL-MCNC: 94 MG/DL (ref 70–110)
MAGNESIUM SERPL-MCNC: 1.9 MG/DL (ref 1.6–2.6)
POTASSIUM SERPL-SCNC: 4 MMOL/L (ref 3.5–5.1)
SODIUM SERPL-SCNC: 143 MMOL/L (ref 136–145)
TSH SERPL DL<=0.005 MIU/L-ACNC: 0.87 UIU/ML (ref 0.4–4)

## 2020-11-03 PROCEDURE — 36415 COLL VENOUS BLD VENIPUNCTURE: CPT | Mod: PN

## 2020-11-03 PROCEDURE — 83735 ASSAY OF MAGNESIUM: CPT

## 2020-11-03 PROCEDURE — 80048 BASIC METABOLIC PNL TOTAL CA: CPT

## 2020-11-03 PROCEDURE — 84443 ASSAY THYROID STIM HORMONE: CPT

## 2020-11-03 PROCEDURE — 83880 ASSAY OF NATRIURETIC PEPTIDE: CPT

## 2020-11-08 DIAGNOSIS — Z91.89 AT RISK FOR ADVERSE DRUG EVENT: ICD-10-CM

## 2020-11-08 DIAGNOSIS — I10 BENIGN ESSENTIAL HTN: ICD-10-CM

## 2020-11-10 RX ORDER — TELMISARTAN 40 MG/1
TABLET ORAL
Qty: 90 TABLET | Refills: 1 | Status: SHIPPED | OUTPATIENT
Start: 2020-11-10 | End: 2022-07-06

## 2020-11-18 ENCOUNTER — PATIENT MESSAGE (OUTPATIENT)
Dept: FAMILY MEDICINE | Facility: CLINIC | Age: 75
End: 2020-11-18

## 2020-11-30 ENCOUNTER — TELEPHONE (OUTPATIENT)
Dept: OBSTETRICS AND GYNECOLOGY | Facility: CLINIC | Age: 75
End: 2020-11-30

## 2020-11-30 DIAGNOSIS — Z12.31 VISIT FOR SCREENING MAMMOGRAM: Primary | ICD-10-CM

## 2020-11-30 NOTE — TELEPHONE ENCOUNTER
----- Message from Maria Elena Back sent at 11/27/2020 11:56 AM CST -----  Contact: [phone]  Type:  Mammogram    Caller is requesting to schedule their annual mammogram appointment.  Order is not listed in EPIC.  Please enter order and contact patient to schedule.  Name of Caller: PT  Where would they like the mammogram performed? [facility]  Would the patient rather a call back or a response via [facility]?  Call back  Best Call Back Number: [phone]

## 2020-12-03 DIAGNOSIS — R60.0 BILATERAL LOWER EXTREMITY EDEMA: ICD-10-CM

## 2020-12-06 RX ORDER — FUROSEMIDE 20 MG/1
TABLET ORAL
Qty: 30 TABLET | Refills: 1 | Status: SHIPPED | OUTPATIENT
Start: 2020-12-06 | End: 2020-12-23

## 2020-12-10 ENCOUNTER — HOSPITAL ENCOUNTER (OUTPATIENT)
Dept: RADIOLOGY | Facility: HOSPITAL | Age: 75
Discharge: HOME | End: 2020-12-10
Attending: OBSTETRICS & GYNECOLOGY
Payer: MEDICARE

## 2020-12-10 DIAGNOSIS — Z12.31 SCREENING MAMMOGRAM, ENCOUNTER FOR: ICD-10-CM

## 2020-12-10 PROCEDURE — 77063 MAMMO DIGITAL SCREENING BILAT WITH TOMO: ICD-10-PCS | Mod: 26,,, | Performed by: RADIOLOGY

## 2020-12-10 PROCEDURE — 77067 SCR MAMMO BI INCL CAD: CPT | Mod: 26,,, | Performed by: RADIOLOGY

## 2020-12-10 PROCEDURE — 77063 BREAST TOMOSYNTHESIS BI: CPT | Mod: 26,,, | Performed by: RADIOLOGY

## 2020-12-10 PROCEDURE — 77067 MAMMO DIGITAL SCREENING BILAT WITH TOMO: ICD-10-PCS | Mod: 26,,, | Performed by: RADIOLOGY

## 2020-12-10 PROCEDURE — 77067 SCR MAMMO BI INCL CAD: CPT | Mod: TC,PO

## 2020-12-11 ENCOUNTER — PATIENT MESSAGE (OUTPATIENT)
Dept: OTHER | Facility: OTHER | Age: 75
End: 2020-12-11

## 2020-12-28 ENCOUNTER — TELEPHONE (OUTPATIENT)
Dept: FAMILY MEDICINE | Facility: CLINIC | Age: 75
End: 2020-12-28

## 2020-12-28 NOTE — TELEPHONE ENCOUNTER
----- Message from Jonathan Veloz sent at 12/28/2020  8:09 AM CST -----  Regarding: appointment  Contact: self  Type:  Same Day Appointment Request    Caller is requesting a same day appointment.  Caller declined first available appointment listed below.      Name of Caller:  self   When is the first available appointment?    Symptoms:    Best Call Back Number:  [phone]  Additional Information:   Patient requesting to switch to audio visit for scheduled appointment today.

## 2021-01-06 ENCOUNTER — OFFICE VISIT (OUTPATIENT)
Dept: FAMILY MEDICINE | Facility: CLINIC | Age: 76
End: 2021-01-06
Payer: MEDICARE

## 2021-01-06 DIAGNOSIS — R60.0 BILATERAL LOWER EXTREMITY EDEMA: ICD-10-CM

## 2021-01-06 DIAGNOSIS — Z78.9 STATIN INTOLERANCE: ICD-10-CM

## 2021-01-06 DIAGNOSIS — E66.01 MORBID OBESITY: ICD-10-CM

## 2021-01-06 DIAGNOSIS — R79.89 PRERENAL AZOTEMIA: ICD-10-CM

## 2021-01-06 DIAGNOSIS — E78.49 OTHER HYPERLIPIDEMIA: ICD-10-CM

## 2021-01-06 DIAGNOSIS — Z01.89 ENCOUNTER FOR LABORATORY TEST: ICD-10-CM

## 2021-01-06 DIAGNOSIS — I25.10 CORONARY ARTERY DISEASE INVOLVING NATIVE CORONARY ARTERY OF NATIVE HEART WITHOUT ANGINA PECTORIS: ICD-10-CM

## 2021-01-06 DIAGNOSIS — I10 BENIGN ESSENTIAL HTN: Primary | ICD-10-CM

## 2021-01-06 DIAGNOSIS — R73.02 IMPAIRED GLUCOSE TOLERANCE: ICD-10-CM

## 2021-01-06 PROCEDURE — 1159F PR MEDICATION LIST DOCUMENTED IN MEDICAL RECORD: ICD-10-PCS | Mod: 95,,, | Performed by: INTERNAL MEDICINE

## 2021-01-06 PROCEDURE — 99499 RISK ADDL DX/OHS AUDIT: ICD-10-PCS | Mod: 95,,, | Performed by: INTERNAL MEDICINE

## 2021-01-06 PROCEDURE — 99214 OFFICE O/P EST MOD 30 MIN: CPT | Mod: 95,,, | Performed by: INTERNAL MEDICINE

## 2021-01-06 PROCEDURE — 3078F DIAST BP <80 MM HG: CPT | Mod: CPTII,95,, | Performed by: INTERNAL MEDICINE

## 2021-01-06 PROCEDURE — 3075F PR MOST RECENT SYSTOLIC BLOOD PRESS GE 130-139MM HG: ICD-10-PCS | Mod: CPTII,95,, | Performed by: INTERNAL MEDICINE

## 2021-01-06 PROCEDURE — 99214 PR OFFICE/OUTPT VISIT, EST, LEVL IV, 30-39 MIN: ICD-10-PCS | Mod: 95,,, | Performed by: INTERNAL MEDICINE

## 2021-01-06 PROCEDURE — 99499 UNLISTED E&M SERVICE: CPT | Mod: 95,,, | Performed by: INTERNAL MEDICINE

## 2021-01-06 PROCEDURE — 3078F PR MOST RECENT DIASTOLIC BLOOD PRESSURE < 80 MM HG: ICD-10-PCS | Mod: CPTII,95,, | Performed by: INTERNAL MEDICINE

## 2021-01-06 PROCEDURE — 1159F MED LIST DOCD IN RCRD: CPT | Mod: 95,,, | Performed by: INTERNAL MEDICINE

## 2021-01-06 PROCEDURE — 3075F SYST BP GE 130 - 139MM HG: CPT | Mod: CPTII,95,, | Performed by: INTERNAL MEDICINE

## 2021-01-06 RX ORDER — HYDROCHLOROTHIAZIDE 12.5 MG/1
TABLET ORAL
Qty: 60 TABLET | Refills: 1 | Status: SHIPPED | OUTPATIENT
Start: 2021-01-06 | End: 2021-06-29

## 2021-01-06 RX ORDER — AMLODIPINE BESYLATE 2.5 MG/1
2.5 TABLET ORAL DAILY
Qty: 90 TABLET | Refills: 1 | Status: SHIPPED | OUTPATIENT
Start: 2021-01-06 | End: 2021-06-29

## 2021-01-17 VITALS — DIASTOLIC BLOOD PRESSURE: 68 MMHG | SYSTOLIC BLOOD PRESSURE: 135 MMHG

## 2021-01-17 PROBLEM — Z01.89 ENCOUNTER FOR LABORATORY TEST: Status: ACTIVE | Noted: 2021-01-17

## 2021-01-17 PROBLEM — R79.89 PRERENAL AZOTEMIA: Status: ACTIVE | Noted: 2021-01-17

## 2021-01-19 ENCOUNTER — TELEPHONE (OUTPATIENT)
Dept: FAMILY MEDICINE | Facility: CLINIC | Age: 76
End: 2021-01-19

## 2021-03-17 ENCOUNTER — PES CALL (OUTPATIENT)
Dept: ADMINISTRATIVE | Facility: CLINIC | Age: 76
End: 2021-03-17

## 2021-04-22 ENCOUNTER — OFFICE VISIT (OUTPATIENT)
Dept: FAMILY MEDICINE | Facility: CLINIC | Age: 76
End: 2021-04-22
Payer: MEDICARE

## 2021-04-22 VITALS
SYSTOLIC BLOOD PRESSURE: 136 MMHG | DIASTOLIC BLOOD PRESSURE: 82 MMHG | BODY MASS INDEX: 42.16 KG/M2 | HEART RATE: 66 BPM | WEIGHT: 253.06 LBS | HEIGHT: 65 IN | OXYGEN SATURATION: 98 %

## 2021-04-22 DIAGNOSIS — M17.12 PRIMARY OSTEOARTHRITIS OF LEFT KNEE: ICD-10-CM

## 2021-04-22 DIAGNOSIS — D50.9 MICROCYTIC ANEMIA: ICD-10-CM

## 2021-04-22 DIAGNOSIS — I10 BENIGN ESSENTIAL HTN: ICD-10-CM

## 2021-04-22 DIAGNOSIS — E78.49 OTHER HYPERLIPIDEMIA: ICD-10-CM

## 2021-04-22 DIAGNOSIS — R73.02 IMPAIRED GLUCOSE TOLERANCE: ICD-10-CM

## 2021-04-22 DIAGNOSIS — E66.01 CLASS 3 SEVERE OBESITY WITH SERIOUS COMORBIDITY AND BODY MASS INDEX (BMI) OF 40.0 TO 44.9 IN ADULT, UNSPECIFIED OBESITY TYPE: ICD-10-CM

## 2021-04-22 DIAGNOSIS — L03.116 CELLULITIS OF LEFT LOWER EXTREMITY: Primary | ICD-10-CM

## 2021-04-22 DIAGNOSIS — Z01.89 ENCOUNTER FOR LABORATORY TEST: ICD-10-CM

## 2021-04-22 DIAGNOSIS — Z96.652 STATUS POST TOTAL LEFT KNEE REPLACEMENT: ICD-10-CM

## 2021-04-22 PROCEDURE — 99999 PR PBB SHADOW E&M-EST. PATIENT-LVL III: CPT | Mod: PBBFAC,,, | Performed by: INTERNAL MEDICINE

## 2021-04-22 PROCEDURE — 1159F MED LIST DOCD IN RCRD: CPT | Mod: S$GLB,,, | Performed by: INTERNAL MEDICINE

## 2021-04-22 PROCEDURE — 99214 OFFICE O/P EST MOD 30 MIN: CPT | Mod: S$GLB,,, | Performed by: INTERNAL MEDICINE

## 2021-04-22 PROCEDURE — 1159F PR MEDICATION LIST DOCUMENTED IN MEDICAL RECORD: ICD-10-PCS | Mod: S$GLB,,, | Performed by: INTERNAL MEDICINE

## 2021-04-22 PROCEDURE — 99214 PR OFFICE/OUTPT VISIT, EST, LEVL IV, 30-39 MIN: ICD-10-PCS | Mod: S$GLB,,, | Performed by: INTERNAL MEDICINE

## 2021-04-22 PROCEDURE — 99999 PR PBB SHADOW E&M-EST. PATIENT-LVL III: ICD-10-PCS | Mod: PBBFAC,,, | Performed by: INTERNAL MEDICINE

## 2021-04-22 RX ORDER — CIPROFLOXACIN 500 MG/1
TABLET ORAL
Qty: 20 TABLET | Refills: 0 | Status: SHIPPED | OUTPATIENT
Start: 2021-04-22 | End: 2021-07-27

## 2021-04-22 RX ORDER — DOXYCYCLINE 100 MG/1
100 CAPSULE ORAL EVERY 12 HOURS
Qty: 20 CAPSULE | Refills: 0 | Status: SHIPPED | OUTPATIENT
Start: 2021-04-22 | End: 2021-05-02

## 2021-05-13 ENCOUNTER — PES CALL (OUTPATIENT)
Dept: ADMINISTRATIVE | Facility: CLINIC | Age: 76
End: 2021-05-13

## 2021-05-14 ENCOUNTER — LAB VISIT (OUTPATIENT)
Dept: LAB | Facility: HOSPITAL | Age: 76
End: 2021-05-14
Attending: INTERNAL MEDICINE
Payer: MEDICARE

## 2021-05-14 DIAGNOSIS — E78.49 OTHER HYPERLIPIDEMIA: ICD-10-CM

## 2021-05-14 DIAGNOSIS — L03.116 CELLULITIS OF LEFT LOWER EXTREMITY: ICD-10-CM

## 2021-05-14 DIAGNOSIS — D50.9 MICROCYTIC ANEMIA: ICD-10-CM

## 2021-05-14 DIAGNOSIS — R73.02 IMPAIRED GLUCOSE TOLERANCE: ICD-10-CM

## 2021-05-14 LAB
ALBUMIN SERPL BCP-MCNC: 3.6 G/DL (ref 3.5–5.2)
ALP SERPL-CCNC: 97 U/L (ref 55–135)
ALT SERPL W/O P-5'-P-CCNC: 9 U/L (ref 10–44)
ANION GAP SERPL CALC-SCNC: 10 MMOL/L (ref 8–16)
AST SERPL-CCNC: 14 U/L (ref 10–40)
BASOPHILS # BLD AUTO: 0.04 K/UL (ref 0–0.2)
BASOPHILS NFR BLD: 0.4 % (ref 0–1.9)
BILIRUB SERPL-MCNC: 0.5 MG/DL (ref 0.1–1)
BUN SERPL-MCNC: 17 MG/DL (ref 8–23)
CALCIUM SERPL-MCNC: 9.7 MG/DL (ref 8.7–10.5)
CHLORIDE SERPL-SCNC: 106 MMOL/L (ref 95–110)
CHOLEST SERPL-MCNC: 207 MG/DL (ref 120–199)
CHOLEST/HDLC SERPL: 5.2 {RATIO} (ref 2–5)
CO2 SERPL-SCNC: 25 MMOL/L (ref 23–29)
CREAT SERPL-MCNC: 0.7 MG/DL (ref 0.5–1.4)
CRP SERPL-MCNC: 20.5 MG/L (ref 0–8.2)
DIFFERENTIAL METHOD: ABNORMAL
EOSINOPHIL # BLD AUTO: 0.3 K/UL (ref 0–0.5)
EOSINOPHIL NFR BLD: 2.6 % (ref 0–8)
ERYTHROCYTE [DISTWIDTH] IN BLOOD BY AUTOMATED COUNT: 15.6 % (ref 11.5–14.5)
ERYTHROCYTE [SEDIMENTATION RATE] IN BLOOD BY WESTERGREN METHOD: 33 MM/HR (ref 0–20)
EST. GFR  (AFRICAN AMERICAN): >60 ML/MIN/1.73 M^2
EST. GFR  (NON AFRICAN AMERICAN): >60 ML/MIN/1.73 M^2
ESTIMATED AVG GLUCOSE: 114 MG/DL (ref 68–131)
FERRITIN SERPL-MCNC: 93 NG/ML (ref 20–300)
GLUCOSE SERPL-MCNC: 103 MG/DL (ref 70–110)
HBA1C MFR BLD: 5.6 % (ref 4–5.6)
HCT VFR BLD AUTO: 35.7 % (ref 37–48.5)
HDLC SERPL-MCNC: 40 MG/DL (ref 40–75)
HDLC SERPL: 19.3 % (ref 20–50)
HGB BLD-MCNC: 11.5 G/DL (ref 12–16)
IMM GRANULOCYTES # BLD AUTO: 0.07 K/UL (ref 0–0.04)
IMM GRANULOCYTES NFR BLD AUTO: 0.7 % (ref 0–0.5)
IRON SERPL-MCNC: 39 UG/DL (ref 30–160)
LDLC SERPL CALC-MCNC: 140.8 MG/DL (ref 63–159)
LYMPHOCYTES # BLD AUTO: 1.6 K/UL (ref 1–4.8)
LYMPHOCYTES NFR BLD: 15.4 % (ref 18–48)
MCH RBC QN AUTO: 26.7 PG (ref 27–31)
MCHC RBC AUTO-ENTMCNC: 32.2 G/DL (ref 32–36)
MCV RBC AUTO: 83 FL (ref 82–98)
MONOCYTES # BLD AUTO: 0.7 K/UL (ref 0.3–1)
MONOCYTES NFR BLD: 7.1 % (ref 4–15)
NEUTROPHILS # BLD AUTO: 7.6 K/UL (ref 1.8–7.7)
NEUTROPHILS NFR BLD: 73.8 % (ref 38–73)
NONHDLC SERPL-MCNC: 167 MG/DL
NRBC BLD-RTO: 0 /100 WBC
PLATELET # BLD AUTO: 260 K/UL (ref 150–450)
PMV BLD AUTO: 11.4 FL (ref 9.2–12.9)
POTASSIUM SERPL-SCNC: 3.8 MMOL/L (ref 3.5–5.1)
PROT SERPL-MCNC: 7.5 G/DL (ref 6–8.4)
RBC # BLD AUTO: 4.3 M/UL (ref 4–5.4)
SATURATED IRON: 12 % (ref 20–50)
SODIUM SERPL-SCNC: 141 MMOL/L (ref 136–145)
TOTAL IRON BINDING CAPACITY: 326 UG/DL (ref 250–450)
TRANSFERRIN SERPL-MCNC: 220 MG/DL (ref 200–375)
TRIGL SERPL-MCNC: 131 MG/DL (ref 30–150)
WBC # BLD AUTO: 10.24 K/UL (ref 3.9–12.7)

## 2021-05-14 PROCEDURE — 36415 COLL VENOUS BLD VENIPUNCTURE: CPT | Mod: PO | Performed by: INTERNAL MEDICINE

## 2021-05-14 PROCEDURE — 80053 COMPREHEN METABOLIC PANEL: CPT | Performed by: INTERNAL MEDICINE

## 2021-05-14 PROCEDURE — 83540 ASSAY OF IRON: CPT | Performed by: INTERNAL MEDICINE

## 2021-05-14 PROCEDURE — 82728 ASSAY OF FERRITIN: CPT | Performed by: INTERNAL MEDICINE

## 2021-05-14 PROCEDURE — 80061 LIPID PANEL: CPT | Performed by: INTERNAL MEDICINE

## 2021-05-14 PROCEDURE — 85025 COMPLETE CBC W/AUTO DIFF WBC: CPT | Performed by: INTERNAL MEDICINE

## 2021-05-14 PROCEDURE — 85651 RBC SED RATE NONAUTOMATED: CPT | Mod: PO | Performed by: INTERNAL MEDICINE

## 2021-05-14 PROCEDURE — 83036 HEMOGLOBIN GLYCOSYLATED A1C: CPT | Performed by: INTERNAL MEDICINE

## 2021-05-14 PROCEDURE — 86140 C-REACTIVE PROTEIN: CPT | Performed by: INTERNAL MEDICINE

## 2021-05-20 ENCOUNTER — OFFICE VISIT (OUTPATIENT)
Dept: FAMILY MEDICINE | Facility: CLINIC | Age: 76
End: 2021-05-20
Payer: MEDICARE

## 2021-05-20 DIAGNOSIS — E66.01 MORBID OBESITY: ICD-10-CM

## 2021-05-20 DIAGNOSIS — I35.0 MODERATE AORTIC STENOSIS: ICD-10-CM

## 2021-05-20 DIAGNOSIS — Z01.89 ENCOUNTER FOR LABORATORY TEST: ICD-10-CM

## 2021-05-20 DIAGNOSIS — R60.0 BILATERAL LOWER EXTREMITY EDEMA: ICD-10-CM

## 2021-05-20 DIAGNOSIS — I10 BENIGN ESSENTIAL HTN: Primary | ICD-10-CM

## 2021-05-20 DIAGNOSIS — E78.49 OTHER HYPERLIPIDEMIA: ICD-10-CM

## 2021-05-20 DIAGNOSIS — E78.5 DYSLIPIDEMIA: ICD-10-CM

## 2021-05-20 DIAGNOSIS — Z96.652 STATUS POST TOTAL LEFT KNEE REPLACEMENT: ICD-10-CM

## 2021-05-20 DIAGNOSIS — I25.10 CORONARY ARTERY DISEASE INVOLVING NATIVE CORONARY ARTERY OF NATIVE HEART WITHOUT ANGINA PECTORIS: ICD-10-CM

## 2021-05-20 PROCEDURE — 99999 PR PBB SHADOW E&M-EST. PATIENT-LVL V: CPT | Mod: PBBFAC,,, | Performed by: INTERNAL MEDICINE

## 2021-05-20 PROCEDURE — 3075F PR MOST RECENT SYSTOLIC BLOOD PRESS GE 130-139MM HG: ICD-10-PCS | Mod: CPTII,S$GLB,, | Performed by: INTERNAL MEDICINE

## 2021-05-20 PROCEDURE — 3288F PR FALLS RISK ASSESSMENT DOCUMENTED: ICD-10-PCS | Mod: CPTII,S$GLB,, | Performed by: INTERNAL MEDICINE

## 2021-05-20 PROCEDURE — 3078F PR MOST RECENT DIASTOLIC BLOOD PRESSURE < 80 MM HG: ICD-10-PCS | Mod: CPTII,S$GLB,, | Performed by: INTERNAL MEDICINE

## 2021-05-20 PROCEDURE — 99214 OFFICE O/P EST MOD 30 MIN: CPT | Mod: S$GLB,,, | Performed by: INTERNAL MEDICINE

## 2021-05-20 PROCEDURE — 1159F MED LIST DOCD IN RCRD: CPT | Mod: S$GLB,,, | Performed by: INTERNAL MEDICINE

## 2021-05-20 PROCEDURE — 1101F PT FALLS ASSESS-DOCD LE1/YR: CPT | Mod: CPTII,S$GLB,, | Performed by: INTERNAL MEDICINE

## 2021-05-20 PROCEDURE — 1101F PR PT FALLS ASSESS DOC 0-1 FALLS W/OUT INJ PAST YR: ICD-10-PCS | Mod: CPTII,S$GLB,, | Performed by: INTERNAL MEDICINE

## 2021-05-20 PROCEDURE — 99214 PR OFFICE/OUTPT VISIT, EST, LEVL IV, 30-39 MIN: ICD-10-PCS | Mod: S$GLB,,, | Performed by: INTERNAL MEDICINE

## 2021-05-20 PROCEDURE — 3288F FALL RISK ASSESSMENT DOCD: CPT | Mod: CPTII,S$GLB,, | Performed by: INTERNAL MEDICINE

## 2021-05-20 PROCEDURE — 1159F PR MEDICATION LIST DOCUMENTED IN MEDICAL RECORD: ICD-10-PCS | Mod: S$GLB,,, | Performed by: INTERNAL MEDICINE

## 2021-05-20 PROCEDURE — 3078F DIAST BP <80 MM HG: CPT | Mod: CPTII,S$GLB,, | Performed by: INTERNAL MEDICINE

## 2021-05-20 PROCEDURE — 99999 PR PBB SHADOW E&M-EST. PATIENT-LVL V: ICD-10-PCS | Mod: PBBFAC,,, | Performed by: INTERNAL MEDICINE

## 2021-05-20 PROCEDURE — 3075F SYST BP GE 130 - 139MM HG: CPT | Mod: CPTII,S$GLB,, | Performed by: INTERNAL MEDICINE

## 2021-05-20 RX ORDER — POTASSIUM CHLORIDE 750 MG/1
10 CAPSULE, EXTENDED RELEASE ORAL DAILY
Qty: 30 CAPSULE | Refills: 2 | Status: SHIPPED | OUTPATIENT
Start: 2021-05-20 | End: 2021-08-13

## 2021-05-20 RX ORDER — EZETIMIBE 10 MG/1
10 TABLET ORAL DAILY
Qty: 90 TABLET | Refills: 1 | Status: SHIPPED | OUTPATIENT
Start: 2021-05-20 | End: 2021-11-16

## 2021-05-30 VITALS — WEIGHT: 251.75 LBS | BODY MASS INDEX: 41.9 KG/M2 | DIASTOLIC BLOOD PRESSURE: 73 MMHG | SYSTOLIC BLOOD PRESSURE: 135 MMHG

## 2021-06-08 ENCOUNTER — OFFICE VISIT (OUTPATIENT)
Dept: FAMILY MEDICINE | Facility: CLINIC | Age: 76
End: 2021-06-08
Payer: MEDICARE

## 2021-06-08 VITALS
SYSTOLIC BLOOD PRESSURE: 134 MMHG | BODY MASS INDEX: 41.82 KG/M2 | WEIGHT: 251.31 LBS | TEMPERATURE: 98 F | DIASTOLIC BLOOD PRESSURE: 74 MMHG | HEART RATE: 60 BPM | OXYGEN SATURATION: 97 %

## 2021-06-08 DIAGNOSIS — Z00.00 ENCOUNTER FOR PREVENTIVE HEALTH EXAMINATION: Primary | ICD-10-CM

## 2021-06-08 DIAGNOSIS — I47.10 PSVT (PAROXYSMAL SUPRAVENTRICULAR TACHYCARDIA): ICD-10-CM

## 2021-06-08 DIAGNOSIS — Z78.0 POSTMENOPAUSAL: ICD-10-CM

## 2021-06-08 DIAGNOSIS — E66.01 MORBID OBESITY: ICD-10-CM

## 2021-06-08 DIAGNOSIS — I25.10 CORONARY ARTERY DISEASE INVOLVING NATIVE CORONARY ARTERY OF NATIVE HEART WITHOUT ANGINA PECTORIS: ICD-10-CM

## 2021-06-08 PROCEDURE — 1101F PR PT FALLS ASSESS DOC 0-1 FALLS W/OUT INJ PAST YR: ICD-10-PCS | Mod: CPTII,S$GLB,, | Performed by: NURSE PRACTITIONER

## 2021-06-08 PROCEDURE — 99999 PR PBB SHADOW E&M-EST. PATIENT-LVL V: CPT | Mod: PBBFAC,,, | Performed by: NURSE PRACTITIONER

## 2021-06-08 PROCEDURE — 1101F PT FALLS ASSESS-DOCD LE1/YR: CPT | Mod: CPTII,S$GLB,, | Performed by: NURSE PRACTITIONER

## 2021-06-08 PROCEDURE — 1158F PR ADVANCE CARE PLANNING DISCUSS DOCUMENTED IN MEDICAL RECORD: ICD-10-PCS | Mod: S$GLB,,, | Performed by: NURSE PRACTITIONER

## 2021-06-08 PROCEDURE — 3288F PR FALLS RISK ASSESSMENT DOCUMENTED: ICD-10-PCS | Mod: CPTII,S$GLB,, | Performed by: NURSE PRACTITIONER

## 2021-06-08 PROCEDURE — 99499 RISK ADDL DX/OHS AUDIT: ICD-10-PCS | Mod: HCNC,S$GLB,, | Performed by: NURSE PRACTITIONER

## 2021-06-08 PROCEDURE — 99499 UNLISTED E&M SERVICE: CPT | Mod: HCNC,S$GLB,, | Performed by: NURSE PRACTITIONER

## 2021-06-08 PROCEDURE — 1158F ADVNC CARE PLAN TLK DOCD: CPT | Mod: S$GLB,,, | Performed by: NURSE PRACTITIONER

## 2021-06-08 PROCEDURE — G0439 PPPS, SUBSEQ VISIT: HCPCS | Mod: S$GLB,,, | Performed by: NURSE PRACTITIONER

## 2021-06-08 PROCEDURE — 3288F FALL RISK ASSESSMENT DOCD: CPT | Mod: CPTII,S$GLB,, | Performed by: NURSE PRACTITIONER

## 2021-06-08 PROCEDURE — G0439 PR MEDICARE ANNUAL WELLNESS SUBSEQUENT VISIT: ICD-10-PCS | Mod: S$GLB,,, | Performed by: NURSE PRACTITIONER

## 2021-06-08 PROCEDURE — 99999 PR PBB SHADOW E&M-EST. PATIENT-LVL V: ICD-10-PCS | Mod: PBBFAC,,, | Performed by: NURSE PRACTITIONER

## 2021-06-12 DIAGNOSIS — R60.0 BILATERAL LOWER EXTREMITY EDEMA: ICD-10-CM

## 2021-06-16 RX ORDER — FUROSEMIDE 20 MG/1
TABLET ORAL
Qty: 90 TABLET | Refills: 1 | Status: SHIPPED | OUTPATIENT
Start: 2021-06-16 | End: 2021-07-27 | Stop reason: SDUPTHER

## 2021-06-25 DIAGNOSIS — I10 BENIGN ESSENTIAL HTN: ICD-10-CM

## 2021-06-25 DIAGNOSIS — R79.89 PRERENAL AZOTEMIA: ICD-10-CM

## 2021-06-28 DIAGNOSIS — R79.89 PRERENAL AZOTEMIA: ICD-10-CM

## 2021-06-28 DIAGNOSIS — I10 BENIGN ESSENTIAL HTN: ICD-10-CM

## 2021-06-29 RX ORDER — AMLODIPINE BESYLATE 2.5 MG/1
TABLET ORAL
Qty: 90 TABLET | Refills: 3 | Status: SHIPPED | OUTPATIENT
Start: 2021-06-29 | End: 2022-07-01

## 2021-06-29 RX ORDER — HYDROCHLOROTHIAZIDE 12.5 MG/1
TABLET ORAL
Qty: 60 TABLET | Refills: 1 | Status: SHIPPED | OUTPATIENT
Start: 2021-06-29 | End: 2021-11-16 | Stop reason: SDUPTHER

## 2021-07-20 ENCOUNTER — HOSPITAL ENCOUNTER (OUTPATIENT)
Dept: RADIOLOGY | Facility: HOSPITAL | Age: 76
Discharge: HOME | End: 2021-07-20
Attending: NURSE PRACTITIONER
Payer: MEDICARE

## 2021-07-20 DIAGNOSIS — Z78.0 POSTMENOPAUSAL: ICD-10-CM

## 2021-07-20 PROCEDURE — 77080 DEXA BONE DENSITY SPINE HIP: ICD-10-PCS | Mod: 26,,, | Performed by: RADIOLOGY

## 2021-07-20 PROCEDURE — 77080 DXA BONE DENSITY AXIAL: CPT | Mod: TC,PO

## 2021-07-20 PROCEDURE — 77080 DXA BONE DENSITY AXIAL: CPT | Mod: 26,,, | Performed by: RADIOLOGY

## 2021-07-27 ENCOUNTER — OFFICE VISIT (OUTPATIENT)
Dept: FAMILY MEDICINE | Facility: CLINIC | Age: 76
End: 2021-07-27
Payer: MEDICARE

## 2021-07-27 VITALS
BODY MASS INDEX: 42.76 KG/M2 | HEART RATE: 54 BPM | WEIGHT: 256.63 LBS | SYSTOLIC BLOOD PRESSURE: 136 MMHG | DIASTOLIC BLOOD PRESSURE: 80 MMHG | OXYGEN SATURATION: 97 % | HEIGHT: 65 IN

## 2021-07-27 DIAGNOSIS — R73.02 IMPAIRED GLUCOSE TOLERANCE: ICD-10-CM

## 2021-07-27 DIAGNOSIS — Z78.9 STATIN INTOLERANCE: ICD-10-CM

## 2021-07-27 DIAGNOSIS — Z13.820 OSTEOPOROSIS SCREENING: ICD-10-CM

## 2021-07-27 DIAGNOSIS — Z01.89 ENCOUNTER FOR LABORATORY TEST: ICD-10-CM

## 2021-07-27 DIAGNOSIS — I87.2 VENOUS INSUFFICIENCY OF BOTH LOWER EXTREMITIES: ICD-10-CM

## 2021-07-27 DIAGNOSIS — Z96.652 HISTORY OF TOTAL LEFT KNEE REPLACEMENT: ICD-10-CM

## 2021-07-27 DIAGNOSIS — R60.0 BILATERAL LOWER EXTREMITY EDEMA: ICD-10-CM

## 2021-07-27 DIAGNOSIS — E78.49 OTHER HYPERLIPIDEMIA: ICD-10-CM

## 2021-07-27 DIAGNOSIS — E78.5 DYSLIPIDEMIA: ICD-10-CM

## 2021-07-27 DIAGNOSIS — I10 BENIGN ESSENTIAL HTN: Primary | ICD-10-CM

## 2021-07-27 DIAGNOSIS — I25.10 CORONARY ARTERY DISEASE INVOLVING NATIVE CORONARY ARTERY OF NATIVE HEART WITHOUT ANGINA PECTORIS: ICD-10-CM

## 2021-07-27 DIAGNOSIS — E66.01 MORBID OBESITY: ICD-10-CM

## 2021-07-27 PROCEDURE — 1160F RVW MEDS BY RX/DR IN RCRD: CPT | Mod: CPTII,S$GLB,, | Performed by: INTERNAL MEDICINE

## 2021-07-27 PROCEDURE — 3075F SYST BP GE 130 - 139MM HG: CPT | Mod: CPTII,S$GLB,, | Performed by: INTERNAL MEDICINE

## 2021-07-27 PROCEDURE — 99214 OFFICE O/P EST MOD 30 MIN: CPT | Mod: S$GLB,,, | Performed by: INTERNAL MEDICINE

## 2021-07-27 PROCEDURE — 3079F DIAST BP 80-89 MM HG: CPT | Mod: CPTII,S$GLB,, | Performed by: INTERNAL MEDICINE

## 2021-07-27 PROCEDURE — 99499 UNLISTED E&M SERVICE: CPT | Mod: HCNC,S$GLB,, | Performed by: INTERNAL MEDICINE

## 2021-07-27 PROCEDURE — 99214 PR OFFICE/OUTPT VISIT, EST, LEVL IV, 30-39 MIN: ICD-10-PCS | Mod: S$GLB,,, | Performed by: INTERNAL MEDICINE

## 2021-07-27 PROCEDURE — 99999 PR PBB SHADOW E&M-EST. PATIENT-LVL III: ICD-10-PCS | Mod: PBBFAC,,, | Performed by: INTERNAL MEDICINE

## 2021-07-27 PROCEDURE — 1101F PR PT FALLS ASSESS DOC 0-1 FALLS W/OUT INJ PAST YR: ICD-10-PCS | Mod: CPTII,S$GLB,, | Performed by: INTERNAL MEDICINE

## 2021-07-27 PROCEDURE — 99999 PR PBB SHADOW E&M-EST. PATIENT-LVL III: CPT | Mod: PBBFAC,,, | Performed by: INTERNAL MEDICINE

## 2021-07-27 PROCEDURE — 3075F PR MOST RECENT SYSTOLIC BLOOD PRESS GE 130-139MM HG: ICD-10-PCS | Mod: CPTII,S$GLB,, | Performed by: INTERNAL MEDICINE

## 2021-07-27 PROCEDURE — 3288F PR FALLS RISK ASSESSMENT DOCUMENTED: ICD-10-PCS | Mod: CPTII,S$GLB,, | Performed by: INTERNAL MEDICINE

## 2021-07-27 PROCEDURE — 3079F PR MOST RECENT DIASTOLIC BLOOD PRESSURE 80-89 MM HG: ICD-10-PCS | Mod: CPTII,S$GLB,, | Performed by: INTERNAL MEDICINE

## 2021-07-27 PROCEDURE — 1160F PR REVIEW ALL MEDS BY PRESCRIBER/CLIN PHARMACIST DOCUMENTED: ICD-10-PCS | Mod: CPTII,S$GLB,, | Performed by: INTERNAL MEDICINE

## 2021-07-27 PROCEDURE — 1101F PT FALLS ASSESS-DOCD LE1/YR: CPT | Mod: CPTII,S$GLB,, | Performed by: INTERNAL MEDICINE

## 2021-07-27 PROCEDURE — 99499 RISK ADDL DX/OHS AUDIT: ICD-10-PCS | Mod: HCNC,S$GLB,, | Performed by: INTERNAL MEDICINE

## 2021-07-27 PROCEDURE — 1159F MED LIST DOCD IN RCRD: CPT | Mod: CPTII,S$GLB,, | Performed by: INTERNAL MEDICINE

## 2021-07-27 PROCEDURE — 3288F FALL RISK ASSESSMENT DOCD: CPT | Mod: CPTII,S$GLB,, | Performed by: INTERNAL MEDICINE

## 2021-07-27 PROCEDURE — 1159F PR MEDICATION LIST DOCUMENTED IN MEDICAL RECORD: ICD-10-PCS | Mod: CPTII,S$GLB,, | Performed by: INTERNAL MEDICINE

## 2021-07-27 RX ORDER — PITAVASTATIN CALCIUM 2.09 MG/1
2 TABLET, FILM COATED ORAL NIGHTLY
Qty: 30 TABLET | Refills: 3 | Status: SHIPPED | OUTPATIENT
Start: 2021-07-27 | End: 2021-11-07

## 2021-07-27 RX ORDER — FUROSEMIDE 20 MG/1
TABLET ORAL
Qty: 180 TABLET | Refills: 1 | Status: SHIPPED | OUTPATIENT
Start: 2021-07-27 | End: 2021-11-16

## 2021-08-10 DIAGNOSIS — R60.0 BILATERAL LOWER EXTREMITY EDEMA: ICD-10-CM

## 2021-08-13 RX ORDER — POTASSIUM CHLORIDE 750 MG/1
10 CAPSULE, EXTENDED RELEASE ORAL DAILY
Qty: 90 CAPSULE | Refills: 1 | Status: SHIPPED | OUTPATIENT
Start: 2021-08-13 | End: 2022-02-09

## 2021-10-12 ENCOUNTER — LAB VISIT (OUTPATIENT)
Dept: LAB | Facility: HOSPITAL | Age: 76
End: 2021-10-12
Attending: INTERNAL MEDICINE
Payer: MEDICARE

## 2021-10-12 DIAGNOSIS — R73.02 IMPAIRED GLUCOSE TOLERANCE: ICD-10-CM

## 2021-10-12 DIAGNOSIS — E78.5 DYSLIPIDEMIA: ICD-10-CM

## 2021-10-12 DIAGNOSIS — E78.49 OTHER HYPERLIPIDEMIA: ICD-10-CM

## 2021-10-12 DIAGNOSIS — I87.2 VENOUS INSUFFICIENCY OF BOTH LOWER EXTREMITIES: ICD-10-CM

## 2021-10-12 DIAGNOSIS — I10 BENIGN ESSENTIAL HTN: ICD-10-CM

## 2021-10-12 DIAGNOSIS — E66.01 MORBID OBESITY: ICD-10-CM

## 2021-10-12 DIAGNOSIS — R60.0 BILATERAL LOWER EXTREMITY EDEMA: ICD-10-CM

## 2021-10-12 DIAGNOSIS — Z01.89 ENCOUNTER FOR LABORATORY TEST: ICD-10-CM

## 2021-10-12 LAB
ALBUMIN SERPL BCP-MCNC: 3.9 G/DL (ref 3.5–5.2)
ALP SERPL-CCNC: 89 U/L (ref 55–135)
ALT SERPL W/O P-5'-P-CCNC: 12 U/L (ref 10–44)
ANION GAP SERPL CALC-SCNC: 12 MMOL/L (ref 8–16)
AST SERPL-CCNC: 15 U/L (ref 10–40)
BASOPHILS # BLD AUTO: 0.05 K/UL (ref 0–0.2)
BASOPHILS NFR BLD: 0.5 % (ref 0–1.9)
BILIRUB SERPL-MCNC: 0.7 MG/DL (ref 0.1–1)
BUN SERPL-MCNC: 21 MG/DL (ref 8–23)
CALCIUM SERPL-MCNC: 10 MG/DL (ref 8.7–10.5)
CHLORIDE SERPL-SCNC: 105 MMOL/L (ref 95–110)
CHOLEST SERPL-MCNC: 240 MG/DL (ref 120–199)
CHOLEST/HDLC SERPL: 4.5 {RATIO} (ref 2–5)
CO2 SERPL-SCNC: 23 MMOL/L (ref 23–29)
CREAT SERPL-MCNC: 0.7 MG/DL (ref 0.5–1.4)
DIFFERENTIAL METHOD: ABNORMAL
EOSINOPHIL # BLD AUTO: 0.2 K/UL (ref 0–0.5)
EOSINOPHIL NFR BLD: 1.5 % (ref 0–8)
ERYTHROCYTE [DISTWIDTH] IN BLOOD BY AUTOMATED COUNT: 14.5 % (ref 11.5–14.5)
EST. GFR  (AFRICAN AMERICAN): >60 ML/MIN/1.73 M^2
EST. GFR  (NON AFRICAN AMERICAN): >60 ML/MIN/1.73 M^2
ESTIMATED AVG GLUCOSE: 111 MG/DL (ref 68–131)
GLUCOSE SERPL-MCNC: 94 MG/DL (ref 70–110)
HBA1C MFR BLD: 5.5 % (ref 4–5.6)
HCT VFR BLD AUTO: 37.5 % (ref 37–48.5)
HDLC SERPL-MCNC: 53 MG/DL (ref 40–75)
HDLC SERPL: 22.1 % (ref 20–50)
HGB BLD-MCNC: 12.3 G/DL (ref 12–16)
IMM GRANULOCYTES # BLD AUTO: 0.06 K/UL (ref 0–0.04)
IMM GRANULOCYTES NFR BLD AUTO: 0.6 % (ref 0–0.5)
LDLC SERPL CALC-MCNC: 163.2 MG/DL (ref 63–159)
LYMPHOCYTES # BLD AUTO: 1.9 K/UL (ref 1–4.8)
LYMPHOCYTES NFR BLD: 18.6 % (ref 18–48)
MAGNESIUM SERPL-MCNC: 1.9 MG/DL (ref 1.6–2.6)
MCH RBC QN AUTO: 27.5 PG (ref 27–31)
MCHC RBC AUTO-ENTMCNC: 32.8 G/DL (ref 32–36)
MCV RBC AUTO: 84 FL (ref 82–98)
MONOCYTES # BLD AUTO: 0.7 K/UL (ref 0.3–1)
MONOCYTES NFR BLD: 7.2 % (ref 4–15)
NEUTROPHILS # BLD AUTO: 7.3 K/UL (ref 1.8–7.7)
NEUTROPHILS NFR BLD: 71.6 % (ref 38–73)
NONHDLC SERPL-MCNC: 187 MG/DL
NRBC BLD-RTO: 0 /100 WBC
PLATELET # BLD AUTO: 260 K/UL (ref 150–450)
PMV BLD AUTO: 10.8 FL (ref 9.2–12.9)
POTASSIUM SERPL-SCNC: 4 MMOL/L (ref 3.5–5.1)
PROT SERPL-MCNC: 7.7 G/DL (ref 6–8.4)
RBC # BLD AUTO: 4.47 M/UL (ref 4–5.4)
SODIUM SERPL-SCNC: 140 MMOL/L (ref 136–145)
TRIGL SERPL-MCNC: 119 MG/DL (ref 30–150)
TSH SERPL DL<=0.005 MIU/L-ACNC: 1.6 UIU/ML (ref 0.4–4)
WBC # BLD AUTO: 10.25 K/UL (ref 3.9–12.7)

## 2021-10-12 PROCEDURE — 80053 COMPREHEN METABOLIC PANEL: CPT | Mod: HCNC | Performed by: INTERNAL MEDICINE

## 2021-10-12 PROCEDURE — 85025 COMPLETE CBC W/AUTO DIFF WBC: CPT | Mod: HCNC | Performed by: INTERNAL MEDICINE

## 2021-10-12 PROCEDURE — 83735 ASSAY OF MAGNESIUM: CPT | Mod: HCNC | Performed by: INTERNAL MEDICINE

## 2021-10-12 PROCEDURE — 83036 HEMOGLOBIN GLYCOSYLATED A1C: CPT | Mod: HCNC | Performed by: INTERNAL MEDICINE

## 2021-10-12 PROCEDURE — 84443 ASSAY THYROID STIM HORMONE: CPT | Mod: HCNC | Performed by: INTERNAL MEDICINE

## 2021-10-12 PROCEDURE — 36415 COLL VENOUS BLD VENIPUNCTURE: CPT | Mod: HCNC,PO | Performed by: INTERNAL MEDICINE

## 2021-10-12 PROCEDURE — 80061 LIPID PANEL: CPT | Mod: HCNC | Performed by: INTERNAL MEDICINE

## 2021-11-04 DIAGNOSIS — E78.5 DYSLIPIDEMIA: ICD-10-CM

## 2021-11-04 DIAGNOSIS — I25.10 CORONARY ARTERY DISEASE INVOLVING NATIVE CORONARY ARTERY OF NATIVE HEART WITHOUT ANGINA PECTORIS: ICD-10-CM

## 2021-11-04 DIAGNOSIS — E78.49 OTHER HYPERLIPIDEMIA: ICD-10-CM

## 2021-11-04 DIAGNOSIS — Z78.9 STATIN INTOLERANCE: ICD-10-CM

## 2021-11-07 RX ORDER — PITAVASTATIN CALCIUM 2.09 MG/1
TABLET, FILM COATED ORAL
Qty: 90 TABLET | Refills: 1 | Status: SHIPPED | OUTPATIENT
Start: 2021-11-07 | End: 2022-05-05

## 2021-11-16 ENCOUNTER — OFFICE VISIT (OUTPATIENT)
Dept: FAMILY MEDICINE | Facility: CLINIC | Age: 76
End: 2021-11-16
Payer: MEDICARE

## 2021-11-16 VITALS
SYSTOLIC BLOOD PRESSURE: 134 MMHG | BODY MASS INDEX: 42.33 KG/M2 | WEIGHT: 254.06 LBS | DIASTOLIC BLOOD PRESSURE: 70 MMHG | OXYGEN SATURATION: 96 % | HEART RATE: 60 BPM | HEIGHT: 65 IN

## 2021-11-16 DIAGNOSIS — E78.49 OTHER HYPERLIPIDEMIA: ICD-10-CM

## 2021-11-16 DIAGNOSIS — I87.2 EDEMA OF BOTH LOWER EXTREMITIES DUE TO PERIPHERAL VENOUS INSUFFICIENCY: ICD-10-CM

## 2021-11-16 DIAGNOSIS — R60.0 BILATERAL LOWER EXTREMITY EDEMA: ICD-10-CM

## 2021-11-16 DIAGNOSIS — I10 BENIGN ESSENTIAL HTN: Primary | ICD-10-CM

## 2021-11-16 DIAGNOSIS — E66.01 MORBID OBESITY: ICD-10-CM

## 2021-11-16 DIAGNOSIS — R73.02 IMPAIRED GLUCOSE TOLERANCE: ICD-10-CM

## 2021-11-16 DIAGNOSIS — R79.89 PRERENAL AZOTEMIA: ICD-10-CM

## 2021-11-16 DIAGNOSIS — I25.10 CORONARY ARTERY DISEASE INVOLVING NATIVE CORONARY ARTERY OF NATIVE HEART WITHOUT ANGINA PECTORIS: ICD-10-CM

## 2021-11-16 DIAGNOSIS — Z78.9 STATIN INTOLERANCE: ICD-10-CM

## 2021-11-16 DIAGNOSIS — Z01.89 ENCOUNTER FOR LABORATORY TEST: ICD-10-CM

## 2021-11-16 DIAGNOSIS — I35.0 MODERATE AORTIC STENOSIS: ICD-10-CM

## 2021-11-16 PROCEDURE — 99999 PR PBB SHADOW E&M-EST. PATIENT-LVL IV: ICD-10-PCS | Mod: PBBFAC,HCNC,, | Performed by: INTERNAL MEDICINE

## 2021-11-16 PROCEDURE — 99214 PR OFFICE/OUTPT VISIT, EST, LEVL IV, 30-39 MIN: ICD-10-PCS | Mod: HCNC,S$GLB,, | Performed by: INTERNAL MEDICINE

## 2021-11-16 PROCEDURE — 99499 UNLISTED E&M SERVICE: CPT | Mod: HCNC,S$GLB,, | Performed by: INTERNAL MEDICINE

## 2021-11-16 PROCEDURE — 99214 OFFICE O/P EST MOD 30 MIN: CPT | Mod: HCNC,S$GLB,, | Performed by: INTERNAL MEDICINE

## 2021-11-16 PROCEDURE — 99499 RISK ADDL DX/OHS AUDIT: ICD-10-PCS | Mod: HCNC,S$GLB,, | Performed by: INTERNAL MEDICINE

## 2021-11-16 PROCEDURE — 99999 PR PBB SHADOW E&M-EST. PATIENT-LVL IV: CPT | Mod: PBBFAC,HCNC,, | Performed by: INTERNAL MEDICINE

## 2021-11-16 RX ORDER — HYDROCHLOROTHIAZIDE 12.5 MG/1
TABLET ORAL
Qty: 60 TABLET | Refills: 1 | Status: SHIPPED | OUTPATIENT
Start: 2021-11-16 | End: 2021-12-16

## 2021-11-16 RX ORDER — LOVASTATIN 10 MG/1
TABLET ORAL
Qty: 30 TABLET | Refills: 2 | Status: SHIPPED | OUTPATIENT
Start: 2021-11-16 | End: 2021-11-16

## 2021-11-16 RX ORDER — AMOXICILLIN 500 MG/1
CAPSULE ORAL
COMMUNITY
Start: 2021-07-06 | End: 2021-11-16

## 2021-12-14 DIAGNOSIS — I10 BENIGN ESSENTIAL HTN: ICD-10-CM

## 2021-12-14 DIAGNOSIS — R79.89 PRERENAL AZOTEMIA: ICD-10-CM

## 2021-12-16 RX ORDER — HYDROCHLOROTHIAZIDE 12.5 MG/1
TABLET ORAL
Qty: 90 TABLET | Refills: 3 | Status: SHIPPED | OUTPATIENT
Start: 2021-12-16 | End: 2023-01-01

## 2022-01-14 DIAGNOSIS — R60.0 BILATERAL LOWER EXTREMITY EDEMA: ICD-10-CM

## 2022-01-14 NOTE — TELEPHONE ENCOUNTER
No new care gaps identified.  Powered by [employer]. Reference number: [id number].   1/14/2022 12:06:19 AM CST

## 2022-01-19 RX ORDER — FUROSEMIDE 20 MG/1
20 TABLET ORAL 2 TIMES DAILY
Qty: 180 TABLET | Refills: 2 | OUTPATIENT
Start: 2022-01-19

## 2022-01-19 NOTE — TELEPHONE ENCOUNTER
Refill Routing Note   Medication(s) are not appropriate for processing by [facility] for the following reason(s):      - Medication not active on medication list    [facility] action(s):  Defer       Medication Therapy Plan: Medication changed on 11/16/2021, not currently on active medication list  --->Care Gap information included in message below if applicable.   Medication reconciliation completed: No   Automatic Epic Generated Protocol Data:        Requested Prescriptions   Pending Prescriptions Disp Refills    furosemide (LASIX) 20 MG tablet [Pharmacy Med Name: FUROSEMIDE 20 MG TABLET] 180 tablet 2     Sig: Take 1 tablet (20 mg total) by mouth 2 (two) times a day. TAKE 1 TABLET BY MOUTH TWICE A DAY       Cardiovascular:  Diuretics - Loop Passed - 1/14/2022 12:05 AM        Passed - Patient is at least 18 years old        Passed - Last BP in normal range within 360 days     BP Readings from Last 1 Encounters:   11/16/21 134/70               Passed - Valid encounter within last 15 months     Recent Visits  Date Type Provider Dept   11/16/21 Office Visit Brett Garcia MD [facility]   07/27/21 Office Visit Brett Garcia MD [facility]   05/20/21 Office Visit Brett Garcia MD [facility]   04/22/21 Office Visit Brett Garcia MD [facility]   01/06/21 Office Visit Brett Garcia MD [facility]   10/13/20 Office Visit Brett Garcia MD [facility]   07/07/20 Office Visit Brett Garcia MD [facility]   03/17/20 Office Visit Brett Garcia MD [facility]   Showing recent visits within past 720 days and meeting all other requirements  Future Appointments  No visits were found meeting these conditions.  Showing future appointments within next 150 days and meeting all other requirements      Future Appointments              In 6 days [facility] [place] [facility]    In 4 weeks [facility], [facility]     In 1 month Brett Garcia MD [facility], [facility]                Passed - K in normal range and within 360 days     POC Potassium   Date Value Ref Range Status   03/05/2021 4.1 3.6 - 5.1 mmol/L Final     Potassium   Date Value Ref Range Status   10/12/2021 4.0 3.5 - 5.1 mmol/L Final   07/20/2021 3.9 3.5 - 5.1 mmol/L Final   05/14/2021 3.8 3.5 - 5.1 mmol/L Final              Passed - Na is between 130 and 148 and within 360 days     POC Sodium   Date Value Ref Range Status   03/05/2021 142 128 - 145 mmol/L Final     Sodium   Date Value Ref Range Status   10/12/2021 140 136 - 145 mmol/L Final   07/20/2021 141 136 - 145 mmol/L Final   05/14/2021 141 136 - 145 mmol/L Final              Passed - Cr is 1.39 or below and within 360 days     Lab Results   Component Value Date    CREATININE 0.7 10/12/2021    CREATININE 0.7 07/20/2021    CREATININE 0.7 05/14/2021    POCCRE 0.8 03/05/2021              Passed - eGFR within 360 days     Lab Results   Component Value Date    POCEGFR >60 03/05/2021    EGFRNONAA >60.0 10/12/2021    EGFRNONAA >60.0 07/20/2021    EGFRNONAA >60.0 05/14/2021                      Appointments  past 12m or future 3m with PCP    Date Provider   Last Visit   11/16/2021 Brett Garcia MD   Next Visit   2/23/2022 Brett Garcia MD   ED visits in past 90 days: 0        Note composed:12:21 PM 01/19/2022

## 2022-01-19 NOTE — TELEPHONE ENCOUNTER
Refill Routing Note   Medication(s) are not appropriate for processing by [facility] for the following reason(s):      - Medication not active on medication list    [facility] action(s):  Defer       Medication Therapy Plan: Furosemide discontinued on 11/16/2021, not currently on active medication list  --->Care Gap information included in message below if applicable.   Medication reconciliation completed: No   Automatic Epic Generated Protocol Data:        Requested Prescriptions   Pending Prescriptions Disp Refills    furosemide (LASIX) 20 MG tablet [Pharmacy Med Name: FUROSEMIDE 20 MG TABLET] 180 tablet 2     Sig: Take 1 tablet (20 mg total) by mouth 2 (two) times a day. TAKE 1 TABLET BY MOUTH TWICE A DAY       Cardiovascular:  Diuretics - Loop Passed - 1/19/2022 12:23 PM        Passed - Patient is at least 18 years old        Passed - Last BP in normal range within 360 days     BP Readings from Last 1 Encounters:   11/16/21 134/70               Passed - Valid encounter within last 15 months     Recent Visits  Date Type Provider Dept   11/16/21 Office Visit Brett Garcia MD [facility]   07/27/21 Office Visit Brett Garcia MD [facility]   05/20/21 Office Visit Brett Garcia MD [facility]   04/22/21 Office Visit Brett Garcia MD [facility]   01/06/21 Office Visit Brett Garcia MD [facility]   10/13/20 Office Visit Brett Garcia MD [facility]   07/07/20 Office Visit Brett Garcia MD [facility]   03/17/20 Office Visit Brett Garcia MD [facility]   Showing recent visits within past 720 days and meeting all other requirements  Future Appointments  No visits were found meeting these conditions.  Showing future appointments within next 150 days and meeting all other requirements      Future Appointments              In 6 days [facility] [place] - [facility] [place]    In 4 weeks [facility], [facility] - Lab,  [place]    In 1 month Brett Garcia MD [facility], [facility]                Passed - K in normal range and within 360 days     POC Potassium   Date Value Ref Range Status   03/05/2021 4.1 3.6 - 5.1 mmol/L Final     Potassium   Date Value Ref Range Status   10/12/2021 4.0 3.5 - 5.1 mmol/L Final   07/20/2021 3.9 3.5 - 5.1 mmol/L Final   05/14/2021 3.8 3.5 - 5.1 mmol/L Final              Passed - Na is between 130 and 148 and within 360 days     POC Sodium   Date Value Ref Range Status   03/05/2021 142 128 - 145 mmol/L Final     Sodium   Date Value Ref Range Status   10/12/2021 140 136 - 145 mmol/L Final   07/20/2021 141 136 - 145 mmol/L Final   05/14/2021 141 136 - 145 mmol/L Final              Passed - Cr is 1.39 or below and within 360 days     Lab Results   Component Value Date    CREATININE 0.7 10/12/2021    CREATININE 0.7 07/20/2021    CREATININE 0.7 05/14/2021    POCCRE 0.8 03/05/2021              Passed - eGFR within 360 days     Lab Results   Component Value Date    POCEGFR >60 03/05/2021    EGFRNONAA >60.0 10/12/2021    EGFRNONAA >60.0 07/20/2021    EGFRNONAA >60.0 05/14/2021                      Appointments  past 12m or future 3m with PCP    Date Provider   Last Visit   11/16/2021 Brett Garcia MD   Next Visit   2/23/2022 Brett Garcia MD   ED visits in past 90 days: 0        Note composed:2:36 PM 01/19/2022

## 2022-01-20 NOTE — TELEPHONE ENCOUNTER
Provider Staff:     Action required for this patient.    Please note Refusal of medication.            Requested Prescriptions     Refused Prescriptions Disp Refills    furosemide (LASIX) 20 MG tablet [Pharmacy Med Name: FUROSEMIDE 20 MG TABLET] 180 tablet 2     Sig: Take 1 tablet (20 mg total) by mouth 2 (two) times a day. TAKE 1 TABLET BY MOUTH TWICE A DAY     Refused By: ABDIRAHMAN MAC     Reason for Refusal: Refill not appropriate      Thanks!  [facility]   Note composed: 01/20/2022 11:43 AM

## 2022-01-24 ENCOUNTER — TELEPHONE (OUTPATIENT)
Dept: FAMILY MEDICINE | Facility: CLINIC | Age: 77
End: 2022-01-24
Payer: MEDICARE

## 2022-01-24 NOTE — TELEPHONE ENCOUNTER
----- Message from Kandice Zeng, Patient Care Assistant sent at 1/24/2022  9:38 AM CST -----  Regarding: advice  Contact: pt  Type: Needs Medical Advice  Who Called:  pt   Best Call Back Number: [phone]    Additional Information: pt states she would like a callback regarding a wellness program. Thanks!

## 2022-01-25 ENCOUNTER — HOSPITAL ENCOUNTER (OUTPATIENT)
Dept: RADIOLOGY | Facility: HOSPITAL | Age: 77
Discharge: HOME | End: 2022-01-25
Attending: INTERNAL MEDICINE
Payer: MEDICARE

## 2022-01-25 DIAGNOSIS — Z12.31 ENCOUNTER FOR SCREENING MAMMOGRAM FOR MALIGNANT NEOPLASM OF BREAST: ICD-10-CM

## 2022-01-25 PROCEDURE — 77063 BREAST TOMOSYNTHESIS BI: CPT | Mod: 26,HCNC,, | Performed by: RADIOLOGY

## 2022-01-25 PROCEDURE — 77067 MAMMO DIGITAL SCREENING BILAT WITH TOMO: ICD-10-PCS | Mod: 26,HCNC,, | Performed by: RADIOLOGY

## 2022-01-25 PROCEDURE — 77063 MAMMO DIGITAL SCREENING BILAT WITH TOMO: ICD-10-PCS | Mod: 26,HCNC,, | Performed by: RADIOLOGY

## 2022-01-25 PROCEDURE — 77063 BREAST TOMOSYNTHESIS BI: CPT | Mod: TC,HCNC,PO

## 2022-01-25 PROCEDURE — 77067 SCR MAMMO BI INCL CAD: CPT | Mod: 26,HCNC,, | Performed by: RADIOLOGY

## 2022-02-03 DIAGNOSIS — R60.0 BILATERAL LOWER EXTREMITY EDEMA: ICD-10-CM

## 2022-02-03 NOTE — TELEPHONE ENCOUNTER
No new care gaps identified.  Powered by [employer] by [employer]. Reference number: [id number].   2/03/2022 12:07:18 AM CST

## 2022-02-09 RX ORDER — POTASSIUM CHLORIDE 750 MG/1
10 CAPSULE, EXTENDED RELEASE ORAL DAILY
Qty: 90 CAPSULE | Refills: 2 | Status: SHIPPED | OUTPATIENT
Start: 2022-02-09 | End: 2022-11-05

## 2022-02-09 NOTE — TELEPHONE ENCOUNTER
Refill Authorization Note   Lanie Cavazos  is requesting a refill authorization.  Brief Assessment and Rationale for Refill:  Approve     Medication Therapy Plan:       Medication Reconciliation Completed: No   Comments:   --->Care Gap information included below if applicable.   Orders Placed This Encounter    potassium chloride (MICRO-K) 10 MEQ CpSR      Requested Prescriptions   Signed Prescriptions Disp Refills    potassium chloride (MICRO-K) 10 MEQ CpSR 90 capsule 2     Sig: TAKE 1 CAPSULE (10 MEQ TOTAL) BY MOUTH ONCE DAILY.       Endocrinology:  Minerals - Potassium Supplementation Passed - 2/3/2022 12:06 AM        Passed - Patient is at least 18 years old        Passed - Valid encounter within last 15 months     Recent Visits  Date Type Provider Dept   11/16/21 Office Visit Brett Garcia MD [facility]   07/27/21 Office Visit Brett Garcia MD [facility]   05/20/21 Office Visit Brett Garcia MD [facility]   04/22/21 Office Visit Brett Garcia MD [facility]   01/06/21 Office Visit Brett Garcia MD [facility]   10/13/20 Office Visit Brett Garcia MD [facility]   07/07/20 Office Visit Brett Garcia MD [facility]   03/17/20 Office Visit Brett Garcia MD [facility]   Showing recent visits within past 720 days and meeting all other requirements  Future Appointments  No visits were found meeting these conditions.  Showing future appointments within next 150 days and meeting all other requirements      Future Appointments              In 1 week [facility], [facility] - Lab, [place]    In 2 weeks Brett Garcia MD [facility]                Passed - K is 5.2 or below and within 360 days     POC Potassium   Date Value Ref Range Status   03/05/2021 4.1 3.6 - 5.1 mmol/L Final     Potassium   Date Value Ref Range Status   10/12/2021 4.0 3.5 - 5.1 mmol/L Final   07/20/2021 3.9 3.5 - 5.1  mmol/L Final   05/14/2021 3.8 3.5 - 5.1 mmol/L Final              Passed - Cr is 1.39 or below and within 360 days     Lab Results   Component Value Date    CREATININE 0.7 10/12/2021    CREATININE 0.7 07/20/2021    CREATININE 0.7 05/14/2021    POCCRE 0.8 03/05/2021              Passed - eGFR within 360 days     Lab Results   Component Value Date    POCEGFR >60 03/05/2021    EGFRNONAA >60.0 10/12/2021    EGFRNONAA >60.0 07/20/2021    EGFRNONAA >60.0 05/14/2021                    Appointments  past 12m or future 3m with PCP    Date Provider   Last Visit   11/16/2021 Brett Garcia MD   Next Visit   2/23/2022 Brett Garcia MD   ED visits in past 90 days: 0     Note composed:8:13 AM 02/09/2022

## 2022-02-16 ENCOUNTER — LAB VISIT (OUTPATIENT)
Dept: LAB | Facility: HOSPITAL | Age: 77
End: 2022-02-16
Attending: INTERNAL MEDICINE
Payer: MEDICARE

## 2022-02-16 DIAGNOSIS — E78.49 OTHER HYPERLIPIDEMIA: ICD-10-CM

## 2022-02-16 DIAGNOSIS — Z78.9 STATIN INTOLERANCE: ICD-10-CM

## 2022-02-16 DIAGNOSIS — Z01.89 ENCOUNTER FOR LABORATORY TEST: ICD-10-CM

## 2022-02-16 DIAGNOSIS — I87.2 EDEMA OF BOTH LOWER EXTREMITIES DUE TO PERIPHERAL VENOUS INSUFFICIENCY: ICD-10-CM

## 2022-02-16 DIAGNOSIS — R73.02 IMPAIRED GLUCOSE TOLERANCE: ICD-10-CM

## 2022-02-16 DIAGNOSIS — R60.0 BILATERAL LOWER EXTREMITY EDEMA: ICD-10-CM

## 2022-02-16 DIAGNOSIS — I10 BENIGN ESSENTIAL HTN: ICD-10-CM

## 2022-02-16 LAB
ALBUMIN SERPL BCP-MCNC: 3.7 G/DL (ref 3.5–5.2)
ALP SERPL-CCNC: 82 U/L (ref 55–135)
ALT SERPL W/O P-5'-P-CCNC: 7 U/L (ref 10–44)
ANION GAP SERPL CALC-SCNC: 10 MMOL/L (ref 8–16)
AST SERPL-CCNC: 13 U/L (ref 10–40)
BASOPHILS # BLD AUTO: 0.05 K/UL (ref 0–0.2)
BASOPHILS NFR BLD: 0.7 % (ref 0–1.9)
BILIRUB SERPL-MCNC: 0.7 MG/DL (ref 0.1–1)
BUN SERPL-MCNC: 15 MG/DL (ref 8–23)
CALCIUM SERPL-MCNC: 9.6 MG/DL (ref 8.7–10.5)
CHLORIDE SERPL-SCNC: 107 MMOL/L (ref 95–110)
CHOLEST SERPL-MCNC: 232 MG/DL (ref 120–199)
CHOLEST/HDLC SERPL: 4.4 {RATIO} (ref 2–5)
CO2 SERPL-SCNC: 24 MMOL/L (ref 23–29)
CREAT SERPL-MCNC: 0.6 MG/DL (ref 0.5–1.4)
DIFFERENTIAL METHOD: ABNORMAL
EOSINOPHIL # BLD AUTO: 0.2 K/UL (ref 0–0.5)
EOSINOPHIL NFR BLD: 2 % (ref 0–8)
ERYTHROCYTE [DISTWIDTH] IN BLOOD BY AUTOMATED COUNT: 14.9 % (ref 11.5–14.5)
EST. GFR  (AFRICAN AMERICAN): >60 ML/MIN/1.73 M^2
EST. GFR  (NON AFRICAN AMERICAN): >60 ML/MIN/1.73 M^2
GLUCOSE SERPL-MCNC: 91 MG/DL (ref 70–110)
HCT VFR BLD AUTO: 37.2 % (ref 37–48.5)
HDLC SERPL-MCNC: 53 MG/DL (ref 40–75)
HDLC SERPL: 22.8 % (ref 20–50)
HGB BLD-MCNC: 12.1 G/DL (ref 12–16)
IMM GRANULOCYTES # BLD AUTO: 0.03 K/UL (ref 0–0.04)
IMM GRANULOCYTES NFR BLD AUTO: 0.4 % (ref 0–0.5)
LDLC SERPL CALC-MCNC: 159.8 MG/DL (ref 63–159)
LYMPHOCYTES # BLD AUTO: 1.4 K/UL (ref 1–4.8)
LYMPHOCYTES NFR BLD: 18 % (ref 18–48)
MCH RBC QN AUTO: 28.4 PG (ref 27–31)
MCHC RBC AUTO-ENTMCNC: 32.5 G/DL (ref 32–36)
MCV RBC AUTO: 87 FL (ref 82–98)
MONOCYTES # BLD AUTO: 0.6 K/UL (ref 0.3–1)
MONOCYTES NFR BLD: 8 % (ref 4–15)
NEUTROPHILS # BLD AUTO: 5.5 K/UL (ref 1.8–7.7)
NEUTROPHILS NFR BLD: 70.9 % (ref 38–73)
NONHDLC SERPL-MCNC: 179 MG/DL
NRBC BLD-RTO: 0 /100 WBC
PLATELET # BLD AUTO: 237 K/UL (ref 150–450)
PMV BLD AUTO: 11.4 FL (ref 9.2–12.9)
POTASSIUM SERPL-SCNC: 3.9 MMOL/L (ref 3.5–5.1)
PROT SERPL-MCNC: 7.3 G/DL (ref 6–8.4)
RBC # BLD AUTO: 4.26 M/UL (ref 4–5.4)
SODIUM SERPL-SCNC: 141 MMOL/L (ref 136–145)
TRIGL SERPL-MCNC: 96 MG/DL (ref 30–150)
WBC # BLD AUTO: 7.67 K/UL (ref 3.9–12.7)

## 2022-02-16 PROCEDURE — 80053 COMPREHEN METABOLIC PANEL: CPT | Mod: HCNC | Performed by: INTERNAL MEDICINE

## 2022-02-16 PROCEDURE — 80061 LIPID PANEL: CPT | Mod: HCNC | Performed by: INTERNAL MEDICINE

## 2022-02-16 PROCEDURE — 36415 COLL VENOUS BLD VENIPUNCTURE: CPT | Mod: HCNC,PO | Performed by: INTERNAL MEDICINE

## 2022-02-16 PROCEDURE — 85025 COMPLETE CBC W/AUTO DIFF WBC: CPT | Mod: HCNC | Performed by: INTERNAL MEDICINE

## 2022-02-23 ENCOUNTER — OFFICE VISIT (OUTPATIENT)
Dept: FAMILY MEDICINE | Facility: CLINIC | Age: 77
End: 2022-02-23
Payer: MEDICARE

## 2022-02-23 VITALS
DIASTOLIC BLOOD PRESSURE: 75 MMHG | WEIGHT: 253.44 LBS | SYSTOLIC BLOOD PRESSURE: 130 MMHG | BODY MASS INDEX: 42.23 KG/M2 | OXYGEN SATURATION: 98 % | HEIGHT: 65 IN | HEART RATE: 53 BPM

## 2022-02-23 DIAGNOSIS — R00.1 BRADYCARDIA: ICD-10-CM

## 2022-02-23 DIAGNOSIS — R06.09 DYSPNEA ON EXERTION: ICD-10-CM

## 2022-02-23 DIAGNOSIS — Z01.89 ENCOUNTER FOR LABORATORY TEST: ICD-10-CM

## 2022-02-23 DIAGNOSIS — I10 BENIGN ESSENTIAL HTN: Primary | ICD-10-CM

## 2022-02-23 DIAGNOSIS — I25.10 CORONARY ARTERY DISEASE INVOLVING NATIVE CORONARY ARTERY OF NATIVE HEART WITHOUT ANGINA PECTORIS: ICD-10-CM

## 2022-02-23 DIAGNOSIS — Z78.9 STATIN INTOLERANCE: ICD-10-CM

## 2022-02-23 DIAGNOSIS — E66.01 MORBID OBESITY: ICD-10-CM

## 2022-02-23 DIAGNOSIS — R73.02 IMPAIRED GLUCOSE TOLERANCE: ICD-10-CM

## 2022-02-23 DIAGNOSIS — I35.0 MODERATE AORTIC STENOSIS: ICD-10-CM

## 2022-02-23 DIAGNOSIS — E78.49 OTHER HYPERLIPIDEMIA: ICD-10-CM

## 2022-02-23 PROCEDURE — 1159F PR MEDICATION LIST DOCUMENTED IN MEDICAL RECORD: ICD-10-PCS | Mod: HCNC,CPTII,S$GLB, | Performed by: INTERNAL MEDICINE

## 2022-02-23 PROCEDURE — 1160F PR REVIEW ALL MEDS BY PRESCRIBER/CLIN PHARMACIST DOCUMENTED: ICD-10-PCS | Mod: HCNC,CPTII,S$GLB, | Performed by: INTERNAL MEDICINE

## 2022-02-23 PROCEDURE — 99999 PR PBB SHADOW E&M-EST. PATIENT-LVL V: CPT | Mod: PBBFAC,HCNC,, | Performed by: INTERNAL MEDICINE

## 2022-02-23 PROCEDURE — 3078F PR MOST RECENT DIASTOLIC BLOOD PRESSURE < 80 MM HG: ICD-10-PCS | Mod: HCNC,CPTII,S$GLB, | Performed by: INTERNAL MEDICINE

## 2022-02-23 PROCEDURE — 99214 PR OFFICE/OUTPT VISIT, EST, LEVL IV, 30-39 MIN: ICD-10-PCS | Mod: HCNC,S$GLB,, | Performed by: INTERNAL MEDICINE

## 2022-02-23 PROCEDURE — 99999 PR PBB SHADOW E&M-EST. PATIENT-LVL V: ICD-10-PCS | Mod: PBBFAC,HCNC,, | Performed by: INTERNAL MEDICINE

## 2022-02-23 PROCEDURE — 3075F SYST BP GE 130 - 139MM HG: CPT | Mod: HCNC,CPTII,S$GLB, | Performed by: INTERNAL MEDICINE

## 2022-02-23 PROCEDURE — 3288F FALL RISK ASSESSMENT DOCD: CPT | Mod: HCNC,CPTII,S$GLB, | Performed by: INTERNAL MEDICINE

## 2022-02-23 PROCEDURE — 1126F AMNT PAIN NOTED NONE PRSNT: CPT | Mod: HCNC,CPTII,S$GLB, | Performed by: INTERNAL MEDICINE

## 2022-02-23 PROCEDURE — 3078F DIAST BP <80 MM HG: CPT | Mod: HCNC,CPTII,S$GLB, | Performed by: INTERNAL MEDICINE

## 2022-02-23 PROCEDURE — 3075F PR MOST RECENT SYSTOLIC BLOOD PRESS GE 130-139MM HG: ICD-10-PCS | Mod: HCNC,CPTII,S$GLB, | Performed by: INTERNAL MEDICINE

## 2022-02-23 PROCEDURE — 1159F MED LIST DOCD IN RCRD: CPT | Mod: HCNC,CPTII,S$GLB, | Performed by: INTERNAL MEDICINE

## 2022-02-23 PROCEDURE — 1101F PT FALLS ASSESS-DOCD LE1/YR: CPT | Mod: HCNC,CPTII,S$GLB, | Performed by: INTERNAL MEDICINE

## 2022-02-23 PROCEDURE — 3288F PR FALLS RISK ASSESSMENT DOCUMENTED: ICD-10-PCS | Mod: HCNC,CPTII,S$GLB, | Performed by: INTERNAL MEDICINE

## 2022-02-23 PROCEDURE — 1126F PR PAIN SEVERITY QUANTIFIED, NO PAIN PRESENT: ICD-10-PCS | Mod: HCNC,CPTII,S$GLB, | Performed by: INTERNAL MEDICINE

## 2022-02-23 PROCEDURE — 1160F RVW MEDS BY RX/DR IN RCRD: CPT | Mod: HCNC,CPTII,S$GLB, | Performed by: INTERNAL MEDICINE

## 2022-02-23 PROCEDURE — 1101F PR PT FALLS ASSESS DOC 0-1 FALLS W/OUT INJ PAST YR: ICD-10-PCS | Mod: HCNC,CPTII,S$GLB, | Performed by: INTERNAL MEDICINE

## 2022-02-23 PROCEDURE — 99214 OFFICE O/P EST MOD 30 MIN: CPT | Mod: HCNC,S$GLB,, | Performed by: INTERNAL MEDICINE

## 2022-02-23 RX ORDER — CHOLESTYRAMINE 4 G/9G
4 POWDER, FOR SUSPENSION ORAL 2 TIMES DAILY
Qty: 180 PACKET | Refills: 3 | Status: SHIPPED | OUTPATIENT
Start: 2022-02-23 | End: 2022-07-06 | Stop reason: SDUPTHER

## 2022-02-23 RX ORDER — METOPROLOL SUCCINATE 25 MG/1
25 TABLET, EXTENDED RELEASE ORAL DAILY
Qty: 30 TABLET | Refills: 11 | Status: SHIPPED | OUTPATIENT
Start: 2022-02-23 | End: 2023-02-18

## 2022-02-23 RX ORDER — EZETIMIBE 10 MG/1
10 TABLET ORAL NIGHTLY
Qty: 90 TABLET | Refills: 3 | Status: SHIPPED | OUTPATIENT
Start: 2022-02-23

## 2022-02-23 RX ORDER — FUROSEMIDE 20 MG/1
TABLET ORAL
COMMUNITY
Start: 2022-01-05 | End: 2022-04-06

## 2022-02-23 NOTE — PROGRESS NOTES
Subjective:       Patient ID: Lanie Cavazos is a 76 y.o. female.    Chief Complaint: Follow-up (Lab results)    HPI:  Patient here today for reassessment and go over labs.  Benign essential HTN; Maintain < 2 Gm Na a day diet, and monitor BP at home; keep a log.  Patient advised to sit 4-5 minutes before taking her blood pressure at home.  Blood pressure here manually is 130/75 with pulse 53 and asymptomatic.  -     metoprolol succinate (TOPROL-XL) 25 MG 24 hr tablet; Take 1 tablet (25 mg total) by mouth once daily.  Dispense: 30 tablet; Refill: 11  -     ezetimibe (ZETIA) 10 mg tablet; Take 1 tablet (10 mg total) by mouth every evening. Generic please.  Dispense: 90 tablet; Refill: 3  -     cholestyramine (QUESTRAN) 4 gram packet; Take 1 packet (4 g total) by mouth 2 (two) times daily.  Dispense: 180 packet; Refill: 3  -     Lipid Panel; Future; Expected date: 02/23/2022  -     Comprehensive Metabolic Panel; Future; Expected date: 02/23/2022  Other hyperlipidemia; ,Maintain low fat high fiber diet, exercise regularly. Weight reduction where indicated. Take zetia daily; has been skipping some; and same for Questran but will increase to 2x a day; adhere to diet and exercise more.  Lipid profile:  Cholesterol 232/triglyceride 96/HDL 53/.8.  Questran and Zetia.  Patient not on pitavastatin due to joint pain.  -     ezetimibe (ZETIA) 10 mg tablet; Take 1 tablet (10 mg total) by mouth every evening. Generic please.  Dispense: 90 tablet; Refill: 3  -     cholestyramine (QUESTRAN) 4 gram packet; Take 1 packet (4 g total) by mouth 2 (two) times daily.  Dispense: 180 packet; Refill: 3  -     Lipid Panel; Future; Expected date: 02/23/2022  -     Comprehensive Metabolic Panel; Future; Expected date: 02/23/2022  Statin intolerance:  Not on pitavastatin due to joint pain  -     cholestyramine (QUESTRAN) 4 gram packet; Take 1 packet (4 g total) by mouth 2 (two) times daily.  Dispense: 180 packet; Refill: 3  -     Lipid  Panel; Future; Expected date: 02/23/2022  -     Comprehensive Metabolic Panel; Future; Expected date: 02/23/2022  Coronary artery disease involving native coronary artery of native heart without angina pectoris; CAD has been stable without any chest pain, shortness of breath, or palpitations. Keep follow up with cardiologist as directed.   -     metoprolol succinate (TOPROL-XL) 25 MG 24 hr tablet; Take 1 tablet (25 mg total) by mouth once daily.  Dispense: 30 tablet; Refill: 11  -     ezetimibe (ZETIA) 10 mg tablet; Take 1 tablet (10 mg total) by mouth every evening. Generic please.  Dispense: 90 tablet; Refill: 3  -     cholestyramine (QUESTRAN) 4 gram packet; Take 1 packet (4 g total) by mouth 2 (two) times daily.  Dispense: 180 packet; Refill: 3  -     Lipid Panel; Future; Expected date: 02/23/2022  -     Comprehensive Metabolic Panel; Future; Expected date: 02/23/2022  -     Hemoglobin A1C; Future; Expected date: 02/23/2022  Bradycardia: asymptomatic w HR low 50's; decrease metoprolol succinate from 50 to 25 mg a day. Call for any BP problems  -     metoprolol succinate (TOPROL-XL) 25 MG 24 hr tablet; Take 1 tablet (25 mg total) by mouth once daily.  Dispense: 30 tablet; Refill: 11  Impaired glucose tolerance: Exercise recommended with weight reduction and low carb diet; we'll follow hemoglobin A1c's with you periodically.  -     Comprehensive Metabolic Panel; Future; Expected date: 02/23/2022  -     Hemoglobin A1C; Future; Expected date: 02/23/2022  Morbid obesity: Caloric restriction w regular exercise and weight reduction.  To try include a little bit more exercise in her daily routine along with portion control.  Encounter for laboratory test; labs reviewed w pt  And ordered for f/u.   Moderate aortic stenosis; has been stable; followed by greg Rachel.   Dyspnea on exertion; has been stable; can benefit from weight los BMI 42.17.  Patient is 5 ft 5 and weighs 253 lb.  Total time 10:28 a.m. through 11:03  "a.m..  Greater than 50% of the time spent in discussion, counseling, and review.  Medications reviewed and discussed.  Various different diagnosis is discussed including plan of care..       Review of Systems   Constitutional: Negative for appetite change and fever.   HENT: Negative for congestion, postnasal drip, rhinorrhea and sinus pressure.         Seasonal allergies: zyrtec helps.    Eyes: Negative for discharge and itching.   Respiratory: Negative for cough, chest tightness, shortness of breath and wheezing.    Cardiovascular: Negative for chest pain, palpitations and leg swelling.   Gastrointestinal: Negative for abdominal distention, abdominal pain, blood in stool, constipation, diarrhea, nausea and vomiting.   Endocrine: Negative for polydipsia, polyphagia and polyuria.   Genitourinary: Negative for dysuria and hematuria.   Musculoskeletal: Negative for arthralgias and myalgias.   Skin: Negative for rash.   Allergic/Immunologic: Negative for environmental allergies and food allergies.   Neurological: Negative for tremors, seizures and syncope.   Hematological: Negative for adenopathy. Does not bruise/bleed easily.   Psychiatric/Behavioral: Negative for dysphoric mood. The patient is not nervous/anxious.       Objective:        Vitals:    02/23/22 0933   BP: 130/75   Pulse: (!) 53   SpO2: 98%   Weight: 114.9 kg (253 lb 6.7 oz)   Height: 5' 5" (1.651 m)       BMI Readings from Last 3 Encounters:   02/23/22 42.17 kg/m²   11/16/21 42.28 kg/m²   07/27/21 42.70 kg/m²        Wt Readings from Last 3 Encounters:   02/23/22 0933 114.9 kg (253 lb 6.7 oz)   11/16/21 0929 115.2 kg (254 lb 1.3 oz)   07/27/21 0952 116.4 kg (256 lb 9.9 oz)        BP Readings from Last 3 Encounters:   02/23/22 130/75   11/16/21 134/70   07/27/21 136/80        There are no preventive care reminders to display for this patient.     There are no preventive care reminders to display for this patient.      Past Medical History:   Diagnosis Date "    Carotid artery stenosis     Coronary artery disease     Hyperlipidemia     Hypertension     Obesity     Paroxysmal supraventricular tachycardia        Past Surgical History:   Procedure Laterality Date    APPENDECTOMY      BREAST BIOPSY Left     negative  needle asp  by Dr Olmedo    CARPAL TUNNEL RELEASE      CATARACT EXTRACTION BILATERAL W/ ANTERIOR VITRECTOMY      COLONOSCOPY N/A 12/18/2015    Procedure: COLONOSCOPY;  Surgeon: Timothy Syed Jr., MD;  Location: [facility];  Service: Endoscopy;  Laterality: N/A;repeatb in 3 yrs. Benign polyps w possible adenomatous change in transverse colon polyp..    EYE SURGERY      HAND SURGERY  01/01/2001    HYSTERECTOMY  1979    without BSO.    toenail extraction Right 2016    4th toe.       Social History     Tobacco Use    Smoking status: Never Smoker    Smokeless tobacco: Never Used   Substance Use Topics    Alcohol use: No     Comment: rarely.    Drug use: No       Family History   Problem Relation Age of Onset    Cancer Mother     Cancer Father     COPD Son     Ovarian cancer Neg Hx     Breast cancer Neg Hx        Review of patient's allergies indicates:   Allergen Reactions    Crestor [rosuvastatin]      Muscle ache    Lipitor [atorvastatin]      Muscle ache    Pravachol [pravastatin]      Muscle ache    Sulfa (sulfonamide antibiotics) Swelling    Welchol [colesevelam]      Muscle ache    Zocor [simvastatin]      Muscle ache    Influenza virus vaccines Swelling and Rash     2014 flu vaccination       Current Outpatient Medications on File Prior to Visit   Medication Sig Dispense Refill    acetaminophen (TYLENOL) 500 MG tablet Take 500 mg by mouth 3 (three) times daily as needed for Pain.      amLODIPine (NORVASC) 2.5 MG tablet TAKE 1 TABLET BY MOUTH EVERY DAY 90 tablet 3    ascorbic acid, vitamin C, (VITAMIN C) 500 MG tablet Take 500 mg by mouth once daily.      aspirin (ECOTRIN) 81 MG EC tablet Take 81 mg by mouth once daily. Three  times a week Monday, Wednesday, Friday.      calcium-vitamin D 500-125 mg-unit tablet Take 1 tablet by mouth once daily.      cholestyramine, with sugar, 4 gram Powd 4 g or 1 scoop to be mixed in 8 oz water daily; if tolerated after 1 week increase to 2x a day. 378 g 2    diclofenac sodium (VOLTAREN) 1 % Gel Apply 2 g topically once daily. 100 g 0    hydroCHLOROthiazide (HYDRODIURIL) 12.5 MG Tab TAKE 1 TABLET BY MOUTH EVERY TUESDAY, THURSDAY, SATURDAY, AND SUNDAY. 90 tablet 3    multivitamin (THERAGRAN) per tablet Take 1 tablet by mouth once daily.      mv-min-FA-D3-om3-dha-epa-fish 200 mcg-500 unit-200 mg Cap Take 1 tablet by mouth once daily.       nystatin (MYCOSTATIN) cream Apply topically 2 (two) times daily. 30 g 2    potassium chloride (MICRO-K) 10 MEQ CpSR TAKE 1 CAPSULE (10 MEQ TOTAL) BY MOUTH ONCE DAILY. 90 capsule 2    terconazole (TERAZOL 7) 0.4 % Crea Place 1 applicator vaginally every evening. 45 g 4    triamcinolone (NASACORT) 55 mcg nasal inhaler 2 sprays by Nasal route once daily.      UBIDECARENONE (COENZYME Q10) 100 mg Tab Take 100 mg by mouth once daily.      vitamin E 400 UNIT capsule Take 400 Units by mouth once daily.      VITAMIN E/FLAXSEED OIL (FLAXSEED OIL-VITAMIN E) 1,000-1 mg-unit Cap Take 1,000 mg by mouth once daily.      [DISCONTINUED] ezetimibe (ZETIA) 10 mg tablet TAKE 1 TABLET BY MOUTH EVERY DAY IN THE EVENING 90 tablet 3    [DISCONTINUED] metoprolol succinate (TOPROL-XL) 50 MG 24 hr tablet Take 1 tablet (50 mg total) by mouth once daily. 90 tablet 1    furosemide (LASIX) 20 MG tablet       GLUCOSAMINE HCL/CHONDR VILLASENOR A NA (OSTEO BI-FLEX ORAL) Take by mouth once daily.      LIVALO 2 mg Tab tablet TAKE 1 TABLET (2 MG TOTAL) BY MOUTH EVERY EVENING. (Patient not taking: Reported on 2/23/2022) 90 tablet 1    methylcellulose, laxative, 500 mg Tab Take by mouth once daily.      telmisartan (MICARDIS) 40 MG Tab TAKE 1 TABLET BY MOUTH EVERY DAY (Patient not taking:  Reported on 2/23/2022) 90 tablet 1     Current Facility-Administered Medications on File Prior to Visit   Medication Dose Route Frequency Provider Last Rate Last Admin    hyaluronate sodium, stabilized (MONOVISC) Syrg   Intra-articular 1 time in Clinic/HOD Rolf Bob MD           Physical Exam  Vitals reviewed.   Constitutional:       Appearance: She is well-developed.   HENT:      Head: Normocephalic and atraumatic.   Neck:      Thyroid: No thyromegaly.   Cardiovascular:      Rate and Rhythm: Normal rate and regular rhythm.      Heart sounds: Normal heart sounds. No murmur heard.    No gallop.      Comments: HOMER 2/6 at aortic and pulm areas. Hx of AS.  Pulmonary:      Effort: Pulmonary effort is normal. No respiratory distress.      Breath sounds: Normal breath sounds. No wheezing or rales.   Abdominal:      General: Bowel sounds are normal. There is no distension.      Palpations: Abdomen is soft.      Tenderness: There is no abdominal tenderness. There is no guarding or rebound.   Musculoskeletal:         General: Normal range of motion.      Cervical back: Normal range of motion and neck supple.      Right lower leg: Edema present.      Left lower leg: Edema present.      Comments: 3-4+ LLE w 3+ RLE edema w no calf tenderness to palp. Spider veins noted; Left knee incision clean.   Lymphadenopathy:      Cervical: No cervical adenopathy.   Skin:     Findings: No rash.   Neurological:      Mental Status: She is alert and oriented to person, place, and time.      Comments: Moves all 4 extremities fine.   Psychiatric:         Behavior: Behavior normal.         Thought Content: Thought content normal.         Assessment:       1. Benign essential HTN    2. Other hyperlipidemia    3. Statin intolerance    4. Coronary artery disease involving native coronary artery of native heart without angina pectoris    5. Bradycardia    6. Impaired glucose tolerance    7. Morbid obesity    8. Encounter for laboratory  test    9. Moderate aortic stenosis    10. Dyspnea on exertion        Plan:       Benign essential HTN; Maintain < 2 Gm Na a day diet, and monitor BP at home; keep a log.  -     metoprolol succinate (TOPROL-XL) 25 MG 24 hr tablet; Take 1 tablet (25 mg total) by mouth once daily.  Dispense: 30 tablet; Refill: 11  -     ezetimibe (ZETIA) 10 mg tablet; Take 1 tablet (10 mg total) by mouth every evening. Generic please.  Dispense: 90 tablet; Refill: 3  -     cholestyramine (QUESTRAN) 4 gram packet; Take 1 packet (4 g total) by mouth 2 (two) times daily.  Dispense: 180 packet; Refill: 3  -     Lipid Panel; Future; Expected date: 02/23/2022  -     Comprehensive Metabolic Panel; Future; Expected date: 02/23/2022    Other hyperlipidemia; ,Maintain low fat high fiber diet, exercise regularly. Weight reduction where indicated. Take zetia daily; has been skipping some; and same for questran but will increase to 2x a day; adhere to diet and exercise more.   -     ezetimibe (ZETIA) 10 mg tablet; Take 1 tablet (10 mg total) by mouth every evening. Generic please.  Dispense: 90 tablet; Refill: 3  -     cholestyramine (QUESTRAN) 4 gram packet; Take 1 packet (4 g total) by mouth 2 (two) times daily.  Dispense: 180 packet; Refill: 3  -     Lipid Panel; Future; Expected date: 02/23/2022  -     Comprehensive Metabolic Panel; Future; Expected date: 02/23/2022    Statin intolerance  -     cholestyramine (QUESTRAN) 4 gram packet; Take 1 packet (4 g total) by mouth 2 (two) times daily.  Dispense: 180 packet; Refill: 3  -     Lipid Panel; Future; Expected date: 02/23/2022  -     Comprehensive Metabolic Panel; Future; Expected date: 02/23/2022    Coronary artery disease involving native coronary artery of native heart without angina pectoris; CAD has been stable without any chest pain, shortness of breath, or palpitations. Keep follow up with cardiologist as directed.   -     metoprolol succinate (TOPROL-XL) 25 MG 24 hr tablet; Take 1 tablet  (25 mg total) by mouth once daily.  Dispense: 30 tablet; Refill: 11  -     ezetimibe (ZETIA) 10 mg tablet; Take 1 tablet (10 mg total) by mouth every evening. Generic please.  Dispense: 90 tablet; Refill: 3  -     cholestyramine (QUESTRAN) 4 gram packet; Take 1 packet (4 g total) by mouth 2 (two) times daily.  Dispense: 180 packet; Refill: 3  -     Lipid Panel; Future; Expected date: 02/23/2022  -     Comprehensive Metabolic Panel; Future; Expected date: 02/23/2022  -     Hemoglobin A1C; Future; Expected date: 02/23/2022    Bradycardia:asymptomatic w HR low 50's; decrease metoprolol succinate from 50 to 25 mg a day. Call for any BP problems  -     metoprolol succinate (TOPROL-XL) 25 MG 24 hr tablet; Take 1 tablet (25 mg total) by mouth once daily.  Dispense: 30 tablet; Refill: 11    Impaired glucose tolerance: Exercise recommended with weight reduction and low carb diet; we'll follow hemoglobin A1c's with you periodically.  -     Comprehensive Metabolic Panel; Future; Expected date: 02/23/2022  -     Hemoglobin A1C; Future; Expected date: 02/23/2022    Morbid obesity: Caloric restriction w regular exercise and weight reduction.    Encounter for laboratory test; labs reviewed w pt  And ordered for f/u.     Moderate aortic stenosis; has been stable; followed by greg Rachel.     Dyspnea on exertion; has been stable; can benefit from weight loss.

## 2022-02-23 NOTE — PATIENT INSTRUCTIONS
Benign essential HTN; Maintain < 2 Gm Na a day diet, and monitor BP at home; keep a log.  -     metoprolol succinate (TOPROL-XL) 25 MG 24 hr tablet; Take 1 tablet (25 mg total) by mouth once daily.  Dispense: 30 tablet; Refill: 11  -     ezetimibe (ZETIA) 10 mg tablet; Take 1 tablet (10 mg total) by mouth every evening. Generic please.  Dispense: 90 tablet; Refill: 3  -     cholestyramine (QUESTRAN) 4 gram packet; Take 1 packet (4 g total) by mouth 2 (two) times daily.  Dispense: 180 packet; Refill: 3  -     Lipid Panel; Future; Expected date: 02/23/2022  -     Comprehensive Metabolic Panel; Future; Expected date: 02/23/2022    Other hyperlipidemia; ,Maintain low fat high fiber diet, exercise regularly. Weight reduction where indicated. Take zetia daily; has been skipping some; and same for questran but will increase to 2x a day; adhere to diet and exercise more.   -     ezetimibe (ZETIA) 10 mg tablet; Take 1 tablet (10 mg total) by mouth every evening. Generic please.  Dispense: 90 tablet; Refill: 3  -     cholestyramine (QUESTRAN) 4 gram packet; Take 1 packet (4 g total) by mouth 2 (two) times daily.  Dispense: 180 packet; Refill: 3  -     Lipid Panel; Future; Expected date: 02/23/2022  -     Comprehensive Metabolic Panel; Future; Expected date: 02/23/2022    Statin intolerance  -     cholestyramine (QUESTRAN) 4 gram packet; Take 1 packet (4 g total) by mouth 2 (two) times daily.  Dispense: 180 packet; Refill: 3  -     Lipid Panel; Future; Expected date: 02/23/2022  -     Comprehensive Metabolic Panel; Future; Expected date: 02/23/2022    Coronary artery disease involving native coronary artery of native heart without angina pectoris; CAD has been stable without any chest pain, shortness of breath, or palpitations. Keep follow up with cardiologist as directed.   -     metoprolol succinate (TOPROL-XL) 25 MG 24 hr tablet; Take 1 tablet (25 mg total) by mouth once daily.  Dispense: 30 tablet; Refill: 11  -      ezetimibe (ZETIA) 10 mg tablet; Take 1 tablet (10 mg total) by mouth every evening. Generic please.  Dispense: 90 tablet; Refill: 3  -     cholestyramine (QUESTRAN) 4 gram packet; Take 1 packet (4 g total) by mouth 2 (two) times daily.  Dispense: 180 packet; Refill: 3  -     Lipid Panel; Future; Expected date: 02/23/2022  -     Comprehensive Metabolic Panel; Future; Expected date: 02/23/2022  -     Hemoglobin A1C; Future; Expected date: 02/23/2022    Bradycardia:asymptomatic w HR low 50's; decrease metoprolol succinate from 50 to 25 mg a day. Call for any BP problems  -     metoprolol succinate (TOPROL-XL) 25 MG 24 hr tablet; Take 1 tablet (25 mg total) by mouth once daily.  Dispense: 30 tablet; Refill: 11    Impaired glucose tolerance: Exercise recommended with weight reduction and low carb diet; we'll follow hemoglobin A1c's with you periodically.  -     Comprehensive Metabolic Panel; Future; Expected date: 02/23/2022  -     Hemoglobin A1C; Future; Expected date: 02/23/2022    Morbid obesity: Caloric restriction w regular exercise and weight reduction.    Encounter for laboratory test; labs reviewed w pt  And ordered for f/u.     Moderate aortic stenosis; has been stable; followed by greg Rachel.     Dyspnea on exertion; has been stable; can benefit from weight loss.

## 2022-04-05 DIAGNOSIS — R60.0 LOCALIZED EDEMA: ICD-10-CM

## 2022-04-05 NOTE — TELEPHONE ENCOUNTER
No new care gaps identified.  Powered by [employer]. Reference number: [id number].   4/05/2022 12:09:54 AM CDT

## 2022-04-06 RX ORDER — FUROSEMIDE 20 MG/1
TABLET ORAL
Qty: 90 TABLET | Refills: 1 | Status: SHIPPED | OUTPATIENT
Start: 2022-04-06 | End: 2022-10-02

## 2022-04-06 NOTE — TELEPHONE ENCOUNTER
Refill Routing Note   Medication(s) are not appropriate for processing by [facility] for the following reason(s):      - Medication not previously prescribed by PCP    [facility] action(s):  Defer          Medication reconciliation completed: No     Appointments  past 12m or future 3m with PCP    Date Provider   Last Visit   2/23/2022 Brett Garcia MD   Next Visit   5/31/2022 Brett Garcia MD   ED visits in past 90 days: 0        Note composed:12:36 PM 04/06/2022

## 2022-05-05 DIAGNOSIS — E78.5 DYSLIPIDEMIA: ICD-10-CM

## 2022-05-05 DIAGNOSIS — I25.10 CORONARY ARTERY DISEASE INVOLVING NATIVE CORONARY ARTERY OF NATIVE HEART WITHOUT ANGINA PECTORIS: ICD-10-CM

## 2022-05-05 DIAGNOSIS — Z78.9 STATIN INTOLERANCE: ICD-10-CM

## 2022-05-05 DIAGNOSIS — E78.49 OTHER HYPERLIPIDEMIA: ICD-10-CM

## 2022-05-05 RX ORDER — PITAVASTATIN CALCIUM 2.09 MG/1
TABLET, FILM COATED ORAL
Qty: 90 TABLET | Refills: 3 | Status: SHIPPED | OUTPATIENT
Start: 2022-05-05 | End: 2022-05-09

## 2022-05-05 NOTE — TELEPHONE ENCOUNTER
No new care gaps identified.  [facility] Embedded Care Gaps. Reference number: [id number]. 5/05/2022   12:08:57 AM ЮЛИЯT

## 2022-05-05 NOTE — TELEPHONE ENCOUNTER
Refill Authorization Note   Lanie Cavazos  is requesting a refill authorization.  Brief Assessment and Rationale for Refill:  Approve          Medication Reconciliation Completed: No   Comments:     No Care Gaps recommended.     Note composed:10:59 AM 05/05/2022

## 2022-05-09 ENCOUNTER — LAB VISIT (OUTPATIENT)
Dept: LAB | Facility: HOSPITAL | Age: 77
End: 2022-05-09
Attending: INTERNAL MEDICINE
Payer: MEDICARE

## 2022-05-09 DIAGNOSIS — I10 HYPERTENSION, ESSENTIAL: ICD-10-CM

## 2022-05-09 DIAGNOSIS — I25.89 CORONARY ARTERITIS: Primary | ICD-10-CM

## 2022-05-09 DIAGNOSIS — E66.09 EXOGENOUS OBESITY: ICD-10-CM

## 2022-05-09 LAB
ALBUMIN SERPL BCP-MCNC: 3.6 G/DL (ref 3.5–5.2)
ALP SERPL-CCNC: 88 U/L (ref 55–135)
ALT SERPL W/O P-5'-P-CCNC: 10 U/L (ref 10–44)
ANION GAP SERPL CALC-SCNC: 10 MMOL/L (ref 8–16)
AST SERPL-CCNC: 12 U/L (ref 10–40)
BILIRUB SERPL-MCNC: 0.7 MG/DL (ref 0.1–1)
BUN SERPL-MCNC: 14 MG/DL (ref 8–23)
CALCIUM SERPL-MCNC: 10 MG/DL (ref 8.7–10.5)
CHLORIDE SERPL-SCNC: 104 MMOL/L (ref 95–110)
CHOLEST SERPL-MCNC: 231 MG/DL (ref 120–199)
CHOLEST/HDLC SERPL: 5.1 {RATIO} (ref 2–5)
CO2 SERPL-SCNC: 25 MMOL/L (ref 23–29)
CREAT SERPL-MCNC: 0.7 MG/DL (ref 0.5–1.4)
ERYTHROCYTE [DISTWIDTH] IN BLOOD BY AUTOMATED COUNT: 14.6 % (ref 11.5–14.5)
EST. GFR  (AFRICAN AMERICAN): >60 ML/MIN/1.73 M^2
EST. GFR  (NON AFRICAN AMERICAN): >60 ML/MIN/1.73 M^2
ESTIMATED AVG GLUCOSE: 108 MG/DL (ref 68–131)
GLUCOSE SERPL-MCNC: 103 MG/DL (ref 70–110)
HBA1C MFR BLD: 5.4 % (ref 4–5.6)
HCT VFR BLD AUTO: 37.8 % (ref 37–48.5)
HDLC SERPL-MCNC: 45 MG/DL (ref 40–75)
HDLC SERPL: 19.5 % (ref 20–50)
HGB BLD-MCNC: 12.2 G/DL (ref 12–16)
LDLC SERPL CALC-MCNC: 161.8 MG/DL (ref 63–159)
MCH RBC QN AUTO: 26.8 PG (ref 27–31)
MCHC RBC AUTO-ENTMCNC: 32.3 G/DL (ref 32–36)
MCV RBC AUTO: 83 FL (ref 82–98)
NONHDLC SERPL-MCNC: 186 MG/DL
PLATELET # BLD AUTO: 256 K/UL (ref 150–450)
PMV BLD AUTO: 10.9 FL (ref 9.2–12.9)
POTASSIUM SERPL-SCNC: 4.6 MMOL/L (ref 3.5–5.1)
PROT SERPL-MCNC: 7.2 G/DL (ref 6–8.4)
RBC # BLD AUTO: 4.55 M/UL (ref 4–5.4)
SODIUM SERPL-SCNC: 139 MMOL/L (ref 136–145)
TRIGL SERPL-MCNC: 121 MG/DL (ref 30–150)
TSH SERPL DL<=0.005 MIU/L-ACNC: 1.25 UIU/ML (ref 0.4–4)
WBC # BLD AUTO: 8.85 K/UL (ref 3.9–12.7)

## 2022-05-09 PROCEDURE — 83036 HEMOGLOBIN GLYCOSYLATED A1C: CPT | Performed by: INTERNAL MEDICINE

## 2022-05-09 PROCEDURE — 84443 ASSAY THYROID STIM HORMONE: CPT | Performed by: INTERNAL MEDICINE

## 2022-05-09 PROCEDURE — 80053 COMPREHEN METABOLIC PANEL: CPT | Performed by: INTERNAL MEDICINE

## 2022-05-09 PROCEDURE — 85027 COMPLETE CBC AUTOMATED: CPT | Performed by: INTERNAL MEDICINE

## 2022-05-09 PROCEDURE — 36415 COLL VENOUS BLD VENIPUNCTURE: CPT | Mod: PO | Performed by: INTERNAL MEDICINE

## 2022-05-09 PROCEDURE — 80061 LIPID PANEL: CPT | Performed by: INTERNAL MEDICINE

## 2022-05-09 RX ORDER — PITAVASTATIN MAGNESIUM 2 MG/1
TABLET, FILM COATED ORAL
Qty: 30 TABLET | Refills: 2 | Status: SHIPPED | OUTPATIENT
Start: 2022-05-09 | End: 2022-07-06

## 2022-05-24 ENCOUNTER — LAB VISIT (OUTPATIENT)
Dept: LAB | Facility: HOSPITAL | Age: 77
End: 2022-05-24
Attending: INTERNAL MEDICINE
Payer: MEDICARE

## 2022-05-24 DIAGNOSIS — E78.49 OTHER HYPERLIPIDEMIA: ICD-10-CM

## 2022-05-24 DIAGNOSIS — R73.02 IMPAIRED GLUCOSE TOLERANCE: ICD-10-CM

## 2022-05-24 DIAGNOSIS — I10 BENIGN ESSENTIAL HTN: ICD-10-CM

## 2022-05-24 DIAGNOSIS — I25.10 CORONARY ARTERY DISEASE INVOLVING NATIVE CORONARY ARTERY OF NATIVE HEART WITHOUT ANGINA PECTORIS: ICD-10-CM

## 2022-05-24 DIAGNOSIS — Z78.9 STATIN INTOLERANCE: ICD-10-CM

## 2022-05-24 LAB
ALBUMIN SERPL BCP-MCNC: 3.5 G/DL (ref 3.5–5.2)
ALP SERPL-CCNC: 90 U/L (ref 55–135)
ALT SERPL W/O P-5'-P-CCNC: 8 U/L (ref 10–44)
ANION GAP SERPL CALC-SCNC: 10 MMOL/L (ref 8–16)
AST SERPL-CCNC: 13 U/L (ref 10–40)
BILIRUB SERPL-MCNC: 0.7 MG/DL (ref 0.1–1)
BUN SERPL-MCNC: 13 MG/DL (ref 8–23)
CALCIUM SERPL-MCNC: 9.7 MG/DL (ref 8.7–10.5)
CHLORIDE SERPL-SCNC: 108 MMOL/L (ref 95–110)
CHOLEST SERPL-MCNC: 227 MG/DL (ref 120–199)
CHOLEST/HDLC SERPL: 4.6 {RATIO} (ref 2–5)
CO2 SERPL-SCNC: 23 MMOL/L (ref 23–29)
CREAT SERPL-MCNC: 0.6 MG/DL (ref 0.5–1.4)
EST. GFR  (AFRICAN AMERICAN): >60 ML/MIN/1.73 M^2
EST. GFR  (NON AFRICAN AMERICAN): >60 ML/MIN/1.73 M^2
ESTIMATED AVG GLUCOSE: 108 MG/DL (ref 68–131)
GLUCOSE SERPL-MCNC: 99 MG/DL (ref 70–110)
HBA1C MFR BLD: 5.4 % (ref 4–5.6)
HDLC SERPL-MCNC: 49 MG/DL (ref 40–75)
HDLC SERPL: 21.6 % (ref 20–50)
LDLC SERPL CALC-MCNC: 158.6 MG/DL (ref 63–159)
NONHDLC SERPL-MCNC: 178 MG/DL
POTASSIUM SERPL-SCNC: 4.6 MMOL/L (ref 3.5–5.1)
PROT SERPL-MCNC: 7.4 G/DL (ref 6–8.4)
SODIUM SERPL-SCNC: 141 MMOL/L (ref 136–145)
TRIGL SERPL-MCNC: 97 MG/DL (ref 30–150)

## 2022-05-24 PROCEDURE — 83036 HEMOGLOBIN GLYCOSYLATED A1C: CPT | Performed by: INTERNAL MEDICINE

## 2022-05-24 PROCEDURE — 80061 LIPID PANEL: CPT | Performed by: INTERNAL MEDICINE

## 2022-05-24 PROCEDURE — 80053 COMPREHEN METABOLIC PANEL: CPT | Performed by: INTERNAL MEDICINE

## 2022-05-24 PROCEDURE — 36415 COLL VENOUS BLD VENIPUNCTURE: CPT | Mod: PO | Performed by: INTERNAL MEDICINE

## 2022-06-27 DIAGNOSIS — I10 BENIGN ESSENTIAL HTN: ICD-10-CM

## 2022-06-27 DIAGNOSIS — R79.89 PRERENAL AZOTEMIA: ICD-10-CM

## 2022-06-27 NOTE — TELEPHONE ENCOUNTER
No new care gaps identified.  [facility] Embedded Care Gaps. Reference number: [id number]. 6/27/2022   12:06:47 AM ЮЛИЯT

## 2022-06-27 NOTE — TELEPHONE ENCOUNTER
Refill Routing Note   Medication(s) are not appropriate for processing by [facility] for the following reason(s):      - Drug-Disease Interaction (amLODIPine and Moderate aortic stenosis)    [facility] action(s):  Defer Medication-related problems identified: Drug-disease interaction        Medication reconciliation completed: No     Appointments  past 12m or future 3m with PCP    Date Provider   Last Visit   2/23/2022 Brett Garcia MD   Next Visit   7/6/2022 Brett Garcia MD   ED visits in past 90 days: 0        Note composed:3:09 PM 06/27/2022

## 2022-07-01 RX ORDER — AMLODIPINE BESYLATE 2.5 MG/1
TABLET ORAL
Qty: 90 TABLET | Refills: 1 | Status: SHIPPED | OUTPATIENT
Start: 2022-07-01 | End: 2023-01-01

## 2022-07-06 ENCOUNTER — OFFICE VISIT (OUTPATIENT)
Dept: FAMILY MEDICINE | Facility: CLINIC | Age: 77
End: 2022-07-06
Payer: MEDICARE

## 2022-07-06 DIAGNOSIS — Z78.9 STATIN INTOLERANCE: ICD-10-CM

## 2022-07-06 DIAGNOSIS — E66.01 MORBID OBESITY: ICD-10-CM

## 2022-07-06 DIAGNOSIS — Z01.89 ENCOUNTER FOR LABORATORY TEST: ICD-10-CM

## 2022-07-06 DIAGNOSIS — R60.0 BILATERAL LOWER EXTREMITY EDEMA: ICD-10-CM

## 2022-07-06 DIAGNOSIS — R73.02 IMPAIRED GLUCOSE TOLERANCE: ICD-10-CM

## 2022-07-06 DIAGNOSIS — I35.0 MODERATE AORTIC STENOSIS: ICD-10-CM

## 2022-07-06 DIAGNOSIS — I25.10 CORONARY ARTERY DISEASE INVOLVING NATIVE CORONARY ARTERY OF NATIVE HEART WITHOUT ANGINA PECTORIS: ICD-10-CM

## 2022-07-06 DIAGNOSIS — E78.49 OTHER HYPERLIPIDEMIA: ICD-10-CM

## 2022-07-06 DIAGNOSIS — I10 BENIGN ESSENTIAL HTN: Primary | ICD-10-CM

## 2022-07-06 PROCEDURE — 99999 PR PBB SHADOW E&M-EST. PATIENT-LVL V: CPT | Mod: PBBFAC,,, | Performed by: INTERNAL MEDICINE

## 2022-07-06 PROCEDURE — 1160F RVW MEDS BY RX/DR IN RCRD: CPT | Mod: CPTII,S$GLB,, | Performed by: INTERNAL MEDICINE

## 2022-07-06 PROCEDURE — 1159F MED LIST DOCD IN RCRD: CPT | Mod: CPTII,S$GLB,, | Performed by: INTERNAL MEDICINE

## 2022-07-06 PROCEDURE — 3288F FALL RISK ASSESSMENT DOCD: CPT | Mod: CPTII,S$GLB,, | Performed by: INTERNAL MEDICINE

## 2022-07-06 PROCEDURE — 99999 PR PBB SHADOW E&M-EST. PATIENT-LVL V: ICD-10-PCS | Mod: PBBFAC,,, | Performed by: INTERNAL MEDICINE

## 2022-07-06 PROCEDURE — 3075F SYST BP GE 130 - 139MM HG: CPT | Mod: CPTII,S$GLB,, | Performed by: INTERNAL MEDICINE

## 2022-07-06 PROCEDURE — 99214 OFFICE O/P EST MOD 30 MIN: CPT | Mod: S$GLB,,, | Performed by: INTERNAL MEDICINE

## 2022-07-06 PROCEDURE — 3075F PR MOST RECENT SYSTOLIC BLOOD PRESS GE 130-139MM HG: ICD-10-PCS | Mod: CPTII,S$GLB,, | Performed by: INTERNAL MEDICINE

## 2022-07-06 PROCEDURE — 3078F DIAST BP <80 MM HG: CPT | Mod: CPTII,S$GLB,, | Performed by: INTERNAL MEDICINE

## 2022-07-06 PROCEDURE — 3078F PR MOST RECENT DIASTOLIC BLOOD PRESSURE < 80 MM HG: ICD-10-PCS | Mod: CPTII,S$GLB,, | Performed by: INTERNAL MEDICINE

## 2022-07-06 PROCEDURE — 99214 PR OFFICE/OUTPT VISIT, EST, LEVL IV, 30-39 MIN: ICD-10-PCS | Mod: S$GLB,,, | Performed by: INTERNAL MEDICINE

## 2022-07-06 PROCEDURE — 1101F PR PT FALLS ASSESS DOC 0-1 FALLS W/OUT INJ PAST YR: ICD-10-PCS | Mod: CPTII,S$GLB,, | Performed by: INTERNAL MEDICINE

## 2022-07-06 PROCEDURE — 1125F PR PAIN SEVERITY QUANTIFIED, PAIN PRESENT: ICD-10-PCS | Mod: CPTII,S$GLB,, | Performed by: INTERNAL MEDICINE

## 2022-07-06 PROCEDURE — 1159F PR MEDICATION LIST DOCUMENTED IN MEDICAL RECORD: ICD-10-PCS | Mod: CPTII,S$GLB,, | Performed by: INTERNAL MEDICINE

## 2022-07-06 PROCEDURE — 1160F PR REVIEW ALL MEDS BY PRESCRIBER/CLIN PHARMACIST DOCUMENTED: ICD-10-PCS | Mod: CPTII,S$GLB,, | Performed by: INTERNAL MEDICINE

## 2022-07-06 PROCEDURE — 1101F PT FALLS ASSESS-DOCD LE1/YR: CPT | Mod: CPTII,S$GLB,, | Performed by: INTERNAL MEDICINE

## 2022-07-06 PROCEDURE — 3288F PR FALLS RISK ASSESSMENT DOCUMENTED: ICD-10-PCS | Mod: CPTII,S$GLB,, | Performed by: INTERNAL MEDICINE

## 2022-07-06 PROCEDURE — 1125F AMNT PAIN NOTED PAIN PRSNT: CPT | Mod: CPTII,S$GLB,, | Performed by: INTERNAL MEDICINE

## 2022-07-06 RX ORDER — CHOLESTYRAMINE 4 G/9G
POWDER, FOR SUSPENSION ORAL
Qty: 270 PACKET | Refills: 1 | Status: SHIPPED | OUTPATIENT
Start: 2022-07-06 | End: 2022-09-13

## 2022-07-06 NOTE — PATIENT INSTRUCTIONS
Benign essential HTN: Maintain < 2 Gm Na a day diet, and monitor BP at home; keep a log.  -     cholestyramine (QUESTRAN) 4 gram packet; 4 gm packet w meals and 8 oz water; 3x a day; geheric  Dispense: 270 packet; Refill: 1    Other hyperlipidemia; Maintain low fat high fiber diet, exercise regularly. Weight reduction where indicated. Cont zetia; increase Questran to 4 gm 3x a day w meals.   -     cholestyramine (QUESTRAN) 4 gram packet; 4 gm packet w meals and 8 oz water; 3x a day; geheric  Dispense: 270 packet; Refill: 1  -     Lipid Panel; Future; Expected date: 07/06/2022  -     Comprehensive Metabolic Panel; Future; Expected date: 07/06/2022    Statin intolerance    Coronary artery disease involving native coronary artery of native heart without angina pectoris; CAD has been stable without any chest pain, shortness of breath, or palpitations. Keep follow up with cardiologist as directed.  Sees Dr Rachel.   -     cholestyramine (QUESTRAN) 4 gram packet; 4 gm packet w meals and 8 oz water; 3x a day; geheric  Dispense: 270 packet; Refill: 1  -     Lipid Panel; Future; Expected date: 07/06/2022  -     Comprehensive Metabolic Panel; Future; Expected date: 07/06/2022    Moderate aortic stenosis; has been stable; followed by greg Rachel.     Impaired glucose tolerance: Exercise recommended with weight reduction and low carb diet; we'll follow hemoglobin A1c's with you periodically.    Morbid obesity: Caloric restriction w regular exercise and weight reduction.    Encounter for laboratory test; reviewed w pt and ordered for f/u.     Bilateral lower extremity edema; Maintain less than 2 g sodium diet; elevate lower extremities at home; use compression stockings if possible.

## 2022-07-06 NOTE — PROGRESS NOTES
Subjective:       Patient ID: Lanie Cavazos is a 76 y.o. female.    Chief Complaint: Follow-up    HPI:  Assessment today and go over lab work as well.  Benign essential HTN: Maintain < 2 Gm Na a day diet, and monitor BP at home; keep a log.  130/75 average at home.  138/76 manually here.  Wait for 5 minutes after being seated before running her blood pressure in the morning.  -     cholestyramine (QUESTRAN) 4 gram packet; 4 gm packet w meals and 8 oz water; 3x a day; geheric  Dispense: 270 packet; Refill: 1  Other hyperlipidemia; Maintain low fat high fiber diet, exercise regularly. Weight reduction where indicated. Cont zetia; increase Questran to 4 gm 3x a day w meals.  Lipid profile:  Cholesterol 227/triglyceride 97/HDL 49/.6.  Patient with a history of coronary artery disease and hypertension with LDL goal less than 70. To watch heard her diet closer and exercise as well on a regular basis.  On Zetia and Questran.; increase Questran to 2 packets in the morning and 1 packet in the afternoon.  Metamucil in the evening will also help.  -     cholestyramine (QUESTRAN) 4 gram packet; 4 gm packet w meals and 8 oz water; 3x a day; geheric  Dispense: 270 packet; Refill: 1  -     Lipid Panel; Future; Expected date: 07/06/2022  -     Comprehensive Metabolic Panel; Future; Expected date: 07/06/2022  Statin intolerance  Coronary artery disease involving native coronary artery of native heart without angina pectoris; CAD has been stable without any chest pain, shortness of breath, or palpitations. Keep follow up with cardiologist as directed.  Sees Dr Rachel.   -     cholestyramine (QUESTRAN) 4 gram packet; 4 gm packet w meals and 8 oz water; 3x a day; geheric  Dispense: 270 packet; Refill: 1  -     Lipid Panel; Future; Expected date: 07/06/2022  -     Comprehensive Metabolic Panel; Future; Expected date: 07/06/2022  Moderate aortic stenosis; has been stable; followed by greg Rachel.  No chest pain, shortness of  "breath, dizziness or syncope spells  Impaired glucose tolerance: Exercise recommended with weight reduction and low carb diet; we'll follow hemoglobin A1c's with you periodically.  Hemoglobin A1c 5.4 with fasting blood sugar 99.   Morbid obesity: Caloric restriction w regular exercise and weight reduction.  Has dropped 3 lb since last visit.  BMI 41.71.  Encounter for laboratory test; reviewed w pt and ordered for f/u.   Bilateral lower extremity edema; Maintain less than 2 g sodium diet; elevate lower extremities at home; use compression stockings if possible.        Review of Systems   Constitutional: Negative for appetite change and fever.   HENT: Negative for congestion, postnasal drip, rhinorrhea and sinus pressure.         Seasonal allergies. Zyrtec used and flonase nasal for congestion.    Eyes: Negative for discharge and itching.   Respiratory: Negative for cough, chest tightness, shortness of breath and wheezing.    Cardiovascular: Negative for chest pain, palpitations and leg swelling.   Gastrointestinal: Negative for abdominal distention, abdominal pain, blood in stool, constipation, diarrhea, nausea and vomiting.   Endocrine: Negative for polydipsia, polyphagia and polyuria.   Genitourinary: Negative for dysuria and hematuria.   Musculoskeletal: Negative for arthralgias and myalgias.   Skin: Negative for rash.   Allergic/Immunologic: Negative for environmental allergies and food allergies.   Neurological: Negative for tremors, seizures and syncope.   Hematological: Negative for adenopathy. Does not bruise/bleed easily.   Psychiatric/Behavioral: Negative for dysphoric mood. The patient is not nervous/anxious.       Objective:        Vitals:    07/06/22 0903   BP: 138/76   Pulse: (!) 57   Resp: 16   Temp: 98.1 °F (36.7 °C)   TempSrc: Oral   SpO2: 97%   Weight: 113.7 kg (250 lb 10.6 oz)   Height: 5' 5" (1.651 m)       BMI Readings from Last 3 Encounters:   07/06/22 41.71 kg/m²   02/23/22 42.17 kg/m² "   11/16/21 42.28 kg/m²        Wt Readings from Last 3 Encounters:   07/06/22 0903 113.7 kg (250 lb 10.6 oz)   02/23/22 0933 114.9 kg (253 lb 6.7 oz)   11/16/21 0929 115.2 kg (254 lb 1.3 oz)        BP Readings from Last 3 Encounters:   07/06/22 138/76   02/23/22 130/75   11/16/21 134/70        There are no preventive care reminders to display for this patient.     Health Maintenance Due   Topic Date Due    COVID-19 Vaccine (1) Never done    Shingles Vaccine (1 of 2) Never done         Past Medical History:   Diagnosis Date    Carotid artery stenosis     Coronary artery disease     Hyperlipidemia     Hypertension     Obesity     Paroxysmal supraventricular tachycardia        Past Surgical History:   Procedure Laterality Date    APPENDECTOMY      BREAST BIOPSY Left     negative  needle asp  by Dr Olmedo    CARPAL TUNNEL RELEASE      CATARACT EXTRACTION BILATERAL W/ ANTERIOR VITRECTOMY      COLONOSCOPY N/A 12/18/2015    Procedure: COLONOSCOPY;  Surgeon: Timothy Syed Jr., MD;  Location: [facility];  Service: Endoscopy;  Laterality: N/A;repeatb in 3 yrs. Benign polyps w possible adenomatous change in transverse colon polyp..    EYE SURGERY      HAND SURGERY  01/01/2001    HYSTERECTOMY  1979    without BSO.    toenail extraction Right 2016    4th toe.       Social History     Tobacco Use    Smoking status: Never Smoker    Smokeless tobacco: Never Used   Substance Use Topics    Alcohol use: No     Comment: rarely.    Drug use: No       Family History   Problem Relation Age of Onset    Cancer Mother     Cancer Father     COPD Son     Ovarian cancer Neg Hx     Breast cancer Neg Hx        Review of patient's allergies indicates:   Allergen Reactions    Crestor [rosuvastatin]      Muscle ache    Lipitor [atorvastatin]      Muscle ache    Pravachol [pravastatin]      Muscle ache    Sulfa (sulfonamide antibiotics) Swelling    Welchol [colesevelam]      Muscle ache    Zocor [simvastatin]       Muscle ache    Influenza virus vaccines Swelling and Rash     2014 flu vaccination       Current Outpatient Medications on File Prior to Visit   Medication Sig Dispense Refill    acetaminophen (TYLENOL) 500 MG tablet Take 500 mg by mouth 3 (three) times daily as needed for Pain.      amLODIPine (NORVASC) 2.5 MG tablet TAKE 1 TABLET BY MOUTH EVERY DAY 90 tablet 1    ascorbic acid, vitamin C, (VITAMIN C) 500 MG tablet Take 500 mg by mouth once daily.      aspirin (ECOTRIN) 81 MG EC tablet Take 81 mg by mouth once daily. Three times a week Monday, Wednesday, Friday.      calcium-vitamin D 500-125 mg-unit tablet Take 1 tablet by mouth once daily.      diclofenac sodium (VOLTAREN) 1 % Gel Apply 2 g topically once daily. 100 g 0    ezetimibe (ZETIA) 10 mg tablet Take 1 tablet (10 mg total) by mouth every evening. Generic please. 90 tablet 3    furosemide (LASIX) 20 MG tablet TAKE 1 TABLET BY MOUTH EVERY DAY 90 tablet 1    GLUCOSAMINE HCL/CHONDR VILLASENOR A NA (OSTEO BI-FLEX ORAL) Take by mouth once daily.      hydroCHLOROthiazide (HYDRODIURIL) 12.5 MG Tab TAKE 1 TABLET BY MOUTH EVERY TUESDAY, THURSDAY, SATURDAY, AND SUNDAY. 90 tablet 3    methylcellulose, laxative, 500 mg Tab Take by mouth once daily.      metoprolol succinate (TOPROL-XL) 25 MG 24 hr tablet Take 1 tablet (25 mg total) by mouth once daily. 30 tablet 11    multivitamin (THERAGRAN) per tablet Take 1 tablet by mouth once daily.      mv-min-FA-D3-om3-dha-epa-fish 200 mcg-500 unit-200 mg Cap Take 1 tablet by mouth once daily.       nystatin (MYCOSTATIN) cream Apply topically 2 (two) times daily. 30 g 2    potassium chloride (MICRO-K) 10 MEQ CpSR TAKE 1 CAPSULE (10 MEQ TOTAL) BY MOUTH ONCE DAILY. 90 capsule 2    terconazole (TERAZOL 7) 0.4 % Crea Place 1 applicator vaginally every evening. 45 g 4    triamcinolone (NASACORT) 55 mcg nasal inhaler 2 sprays by Nasal route once daily.      UBIDECARENONE (COENZYME Q10) 100 mg Tab Take 100 mg by mouth once  daily.      vitamin E 400 UNIT capsule Take 400 Units by mouth once daily.      VITAMIN E/FLAXSEED OIL (FLAXSEED OIL-VITAMIN E) 1,000-1 mg-unit Cap Take 1,000 mg by mouth once daily.      [DISCONTINUED] cholestyramine (QUESTRAN) 4 gram packet Take 1 packet (4 g total) by mouth 2 (two) times daily. 180 packet 3    [DISCONTINUED] cholestyramine, with sugar, 4 gram Powd 4 g or 1 scoop to be mixed in 8 oz water daily; if tolerated after 1 week increase to 2x a day. (Patient not taking: Reported on 7/6/2022) 378 g 2    [DISCONTINUED] pitavastatin magnesium (ZYPITAMAG) 2 mg Tab Take 2 mg p.o. each evening; generic please (Patient not taking: Reported on 7/6/2022) 30 tablet 2    [DISCONTINUED] telmisartan (MICARDIS) 40 MG Tab TAKE 1 TABLET BY MOUTH EVERY DAY (Patient not taking: No sig reported) 90 tablet 1     Current Facility-Administered Medications on File Prior to Visit   Medication Dose Route Frequency Provider Last Rate Last Admin    hyaluronate sodium, stabilized (MONOVISC) Syrg   Intra-articular 1 time in Clinic/HOD Rolf Bob MD           Physical Exam  Vitals reviewed.   Constitutional:       Appearance: She is well-developed.   HENT:      Head: Normocephalic and atraumatic.   Neck:      Thyroid: No thyromegaly.   Cardiovascular:      Rate and Rhythm: Normal rate and regular rhythm.      Heart sounds: Normal heart sounds. No murmur heard.    No gallop.      Comments: HOMER 2/6 at aortic.   Pulmonary:      Effort: Pulmonary effort is normal. No respiratory distress.      Breath sounds: Normal breath sounds. No wheezing or rales.   Abdominal:      General: Bowel sounds are normal. There is no distension.      Palpations: Abdomen is soft.      Tenderness: There is no abdominal tenderness. There is no guarding or rebound.   Musculoskeletal:         General: Normal range of motion.      Cervical back: Normal range of motion and neck supple.      Right lower leg: Edema present.      Left lower leg:  Edema present.      Comments: Obese JARED's w 3-4+ edema and no calf tenderness to palp.    Lymphadenopathy:      Cervical: No cervical adenopathy.   Skin:     Findings: No rash.   Neurological:      Mental Status: She is alert and oriented to person, place, and time.      Comments: Moves all 4 extremities fine.   Psychiatric:         Behavior: Behavior normal.         Thought Content: Thought content normal.         Assessment:       1. Benign essential HTN    2. Other hyperlipidemia    3. Statin intolerance    4. Coronary artery disease involving native coronary artery of native heart without angina pectoris    5. Moderate aortic stenosis    6. Impaired glucose tolerance    7. Morbid obesity    8. Encounter for laboratory test    9. Bilateral lower extremity edema        Plan:       Benign essential HTN: Maintain < 2 Gm Na a day diet, and monitor BP at home; keep a log.  -     cholestyramine (QUESTRAN) 4 gram packet; 4 gm packet w meals and 8 oz water; 3x a day; geheric  Dispense: 270 packet; Refill: 1    Other hyperlipidemia; Maintain low fat high fiber diet, exercise regularly. Weight reduction where indicated. Cont zetia; increase Questran to 4 gm 3x a day w meals.   -     cholestyramine (QUESTRAN) 4 gram packet; 4 gm packet w meals and 8 oz water; 3x a day; geheric  Dispense: 270 packet; Refill: 1  -     Lipid Panel; Future; Expected date: 07/06/2022  -     Comprehensive Metabolic Panel; Future; Expected date: 07/06/2022    Statin intolerance    Coronary artery disease involving native coronary artery of native heart without angina pectoris; CAD has been stable without any chest pain, shortness of breath, or palpitations. Keep follow up with cardiologist as directed.  Sees Dr Rachel.   -     cholestyramine (QUESTRAN) 4 gram packet; 4 gm packet w meals and 8 oz water; 3x a day; geheric  Dispense: 270 packet; Refill: 1  -     Lipid Panel; Future; Expected date: 07/06/2022  -     Comprehensive Metabolic Panel;  Future; Expected date: 07/06/2022    Moderate aortic stenosis; has been stable; followed by greg Rachel.     Impaired glucose tolerance: Exercise recommended with weight reduction and low carb diet; we'll follow hemoglobin A1c's with you periodically.    Morbid obesity: Caloric restriction w regular exercise and weight reduction.    Encounter for laboratory test; reviewed w pt and ordered for f/u.     Bilateral lower extremity edema; Maintain less than 2 g sodium diet; elevate lower extremities at home; use compression stockings if possible.

## 2022-07-17 VITALS
BODY MASS INDEX: 41.77 KG/M2 | WEIGHT: 250.69 LBS | TEMPERATURE: 98 F | SYSTOLIC BLOOD PRESSURE: 130 MMHG | RESPIRATION RATE: 16 BRPM | HEIGHT: 65 IN | HEART RATE: 57 BPM | DIASTOLIC BLOOD PRESSURE: 75 MMHG | OXYGEN SATURATION: 97 %

## 2022-07-17 PROBLEM — R60.0 BILATERAL LOWER EXTREMITY EDEMA: Status: ACTIVE | Noted: 2022-07-17

## 2022-09-07 ENCOUNTER — LAB VISIT (OUTPATIENT)
Dept: LAB | Facility: HOSPITAL | Age: 77
End: 2022-09-07
Attending: INTERNAL MEDICINE
Payer: MEDICARE

## 2022-09-07 DIAGNOSIS — E78.49 OTHER HYPERLIPIDEMIA: ICD-10-CM

## 2022-09-07 DIAGNOSIS — I25.10 CORONARY ARTERY DISEASE INVOLVING NATIVE CORONARY ARTERY OF NATIVE HEART WITHOUT ANGINA PECTORIS: ICD-10-CM

## 2022-09-07 LAB
ALBUMIN SERPL BCP-MCNC: 3.8 G/DL (ref 3.5–5.2)
ALP SERPL-CCNC: 80 U/L (ref 55–135)
ALT SERPL W/O P-5'-P-CCNC: 8 U/L (ref 10–44)
ANION GAP SERPL CALC-SCNC: 9 MMOL/L (ref 8–16)
AST SERPL-CCNC: 15 U/L (ref 10–40)
BILIRUB SERPL-MCNC: 0.4 MG/DL (ref 0.1–1)
BUN SERPL-MCNC: 15 MG/DL (ref 8–23)
CALCIUM SERPL-MCNC: 9.5 MG/DL (ref 8.7–10.5)
CHLORIDE SERPL-SCNC: 111 MMOL/L (ref 95–110)
CHOLEST SERPL-MCNC: 214 MG/DL (ref 120–199)
CHOLEST/HDLC SERPL: 4 {RATIO} (ref 2–5)
CO2 SERPL-SCNC: 21 MMOL/L (ref 23–29)
CREAT SERPL-MCNC: 0.7 MG/DL (ref 0.5–1.4)
EST. GFR  (NO RACE VARIABLE): >60 ML/MIN/1.73 M^2
GLUCOSE SERPL-MCNC: 90 MG/DL (ref 70–110)
HDLC SERPL-MCNC: 53 MG/DL (ref 40–75)
HDLC SERPL: 24.8 % (ref 20–50)
LDLC SERPL CALC-MCNC: 140.4 MG/DL (ref 63–159)
NONHDLC SERPL-MCNC: 161 MG/DL
POTASSIUM SERPL-SCNC: 4.1 MMOL/L (ref 3.5–5.1)
PROT SERPL-MCNC: 7.2 G/DL (ref 6–8.4)
SODIUM SERPL-SCNC: 141 MMOL/L (ref 136–145)
TRIGL SERPL-MCNC: 103 MG/DL (ref 30–150)

## 2022-09-07 PROCEDURE — 80053 COMPREHEN METABOLIC PANEL: CPT | Performed by: INTERNAL MEDICINE

## 2022-09-07 PROCEDURE — 80061 LIPID PANEL: CPT | Performed by: INTERNAL MEDICINE

## 2022-09-07 PROCEDURE — 36415 COLL VENOUS BLD VENIPUNCTURE: CPT | Mod: PO | Performed by: INTERNAL MEDICINE

## 2022-09-13 ENCOUNTER — OFFICE VISIT (OUTPATIENT)
Dept: FAMILY MEDICINE | Facility: CLINIC | Age: 77
End: 2022-09-13
Payer: MEDICARE

## 2022-09-13 VITALS
HEART RATE: 55 BPM | WEIGHT: 257.25 LBS | OXYGEN SATURATION: 96 % | SYSTOLIC BLOOD PRESSURE: 136 MMHG | DIASTOLIC BLOOD PRESSURE: 80 MMHG | HEIGHT: 65 IN | BODY MASS INDEX: 42.86 KG/M2

## 2022-09-13 DIAGNOSIS — E66.01 MORBID OBESITY: ICD-10-CM

## 2022-09-13 DIAGNOSIS — R60.0 BILATERAL LOWER EXTREMITY EDEMA: ICD-10-CM

## 2022-09-13 DIAGNOSIS — I10 BENIGN ESSENTIAL HTN: Primary | ICD-10-CM

## 2022-09-13 DIAGNOSIS — R73.02 IMPAIRED GLUCOSE TOLERANCE: ICD-10-CM

## 2022-09-13 DIAGNOSIS — Z78.9 STATIN INTOLERANCE: ICD-10-CM

## 2022-09-13 DIAGNOSIS — I25.10 CORONARY ARTERY DISEASE INVOLVING NATIVE CORONARY ARTERY OF NATIVE HEART WITHOUT ANGINA PECTORIS: ICD-10-CM

## 2022-09-13 DIAGNOSIS — E78.49 OTHER HYPERLIPIDEMIA: ICD-10-CM

## 2022-09-13 DIAGNOSIS — Z01.89 ENCOUNTER FOR LABORATORY TEST: ICD-10-CM

## 2022-09-13 PROCEDURE — 1126F AMNT PAIN NOTED NONE PRSNT: CPT | Mod: CPTII,S$GLB,, | Performed by: INTERNAL MEDICINE

## 2022-09-13 PROCEDURE — 1126F PR PAIN SEVERITY QUANTIFIED, NO PAIN PRESENT: ICD-10-PCS | Mod: CPTII,S$GLB,, | Performed by: INTERNAL MEDICINE

## 2022-09-13 PROCEDURE — 1159F MED LIST DOCD IN RCRD: CPT | Mod: CPTII,S$GLB,, | Performed by: INTERNAL MEDICINE

## 2022-09-13 PROCEDURE — 99214 PR OFFICE/OUTPT VISIT, EST, LEVL IV, 30-39 MIN: ICD-10-PCS | Mod: S$GLB,,, | Performed by: INTERNAL MEDICINE

## 2022-09-13 PROCEDURE — 1159F PR MEDICATION LIST DOCUMENTED IN MEDICAL RECORD: ICD-10-PCS | Mod: CPTII,S$GLB,, | Performed by: INTERNAL MEDICINE

## 2022-09-13 PROCEDURE — 1101F PR PT FALLS ASSESS DOC 0-1 FALLS W/OUT INJ PAST YR: ICD-10-PCS | Mod: CPTII,S$GLB,, | Performed by: INTERNAL MEDICINE

## 2022-09-13 PROCEDURE — 3288F PR FALLS RISK ASSESSMENT DOCUMENTED: ICD-10-PCS | Mod: CPTII,S$GLB,, | Performed by: INTERNAL MEDICINE

## 2022-09-13 PROCEDURE — 3079F DIAST BP 80-89 MM HG: CPT | Mod: CPTII,S$GLB,, | Performed by: INTERNAL MEDICINE

## 2022-09-13 PROCEDURE — 3079F PR MOST RECENT DIASTOLIC BLOOD PRESSURE 80-89 MM HG: ICD-10-PCS | Mod: CPTII,S$GLB,, | Performed by: INTERNAL MEDICINE

## 2022-09-13 PROCEDURE — 3288F FALL RISK ASSESSMENT DOCD: CPT | Mod: CPTII,S$GLB,, | Performed by: INTERNAL MEDICINE

## 2022-09-13 PROCEDURE — 99999 PR PBB SHADOW E&M-EST. PATIENT-LVL V: ICD-10-PCS | Mod: PBBFAC,,, | Performed by: INTERNAL MEDICINE

## 2022-09-13 PROCEDURE — 99214 OFFICE O/P EST MOD 30 MIN: CPT | Mod: S$GLB,,, | Performed by: INTERNAL MEDICINE

## 2022-09-13 PROCEDURE — 1160F PR REVIEW ALL MEDS BY PRESCRIBER/CLIN PHARMACIST DOCUMENTED: ICD-10-PCS | Mod: CPTII,S$GLB,, | Performed by: INTERNAL MEDICINE

## 2022-09-13 PROCEDURE — 1160F RVW MEDS BY RX/DR IN RCRD: CPT | Mod: CPTII,S$GLB,, | Performed by: INTERNAL MEDICINE

## 2022-09-13 PROCEDURE — 3075F SYST BP GE 130 - 139MM HG: CPT | Mod: CPTII,S$GLB,, | Performed by: INTERNAL MEDICINE

## 2022-09-13 PROCEDURE — 99999 PR PBB SHADOW E&M-EST. PATIENT-LVL V: CPT | Mod: PBBFAC,,, | Performed by: INTERNAL MEDICINE

## 2022-09-13 PROCEDURE — 1101F PT FALLS ASSESS-DOCD LE1/YR: CPT | Mod: CPTII,S$GLB,, | Performed by: INTERNAL MEDICINE

## 2022-09-13 PROCEDURE — 3075F PR MOST RECENT SYSTOLIC BLOOD PRESS GE 130-139MM HG: ICD-10-PCS | Mod: CPTII,S$GLB,, | Performed by: INTERNAL MEDICINE

## 2022-09-13 RX ORDER — CHOLESTYRAMINE 4 G/9G
4 POWDER, FOR SUSPENSION ORAL
Qty: 270 PACKET | Refills: 3 | Status: SHIPPED | OUTPATIENT
Start: 2022-09-13 | End: 2023-02-02

## 2022-09-13 RX ORDER — CEPHALEXIN 500 MG/1
500 CAPSULE ORAL 3 TIMES DAILY
COMMUNITY
Start: 2022-09-01 | End: 2023-02-02 | Stop reason: ALTCHOICE

## 2022-09-13 NOTE — PATIENT INSTRUCTIONS
Benign essential HTN; Maintain < 2 Gm Na a day diet, and monitor BP at home; keep a log. Increase HCTZ to 12.5 mg daily from 4x a week.   -     Cancel: Comprehensive Metabolic Panel; Future; Expected date: 09/13/2022  -     Cancel: Magnesium; Future; Expected date: 09/13/2022  -     Lipid Panel; Future; Expected date: 09/13/2022  -     Comprehensive Metabolic Panel; Future; Expected date: 09/13/2022  -     Magnesium; Future; Expected date: 09/13/2022  -     CBC Auto Differential; Future; Expected date: 09/13/2022    Other hyperlipidemia: Maintain low fat high fiber diet, exercise regularly. Weight reduction where indicated. Change questran to 3x a day. Adhere to diet better. Exercise when foot better.   -     cholestyramine (QUESTRAN) 4 gram packet; Take 1 packet (4 g total) by mouth 3 (three) times daily with meals.  Dispense: 270 packet; Refill: 3  -     Cancel: Lipid Panel; Future; Expected date: 09/13/2022  -     Cancel: Comprehensive Metabolic Panel; Future; Expected date: 09/13/2022  -     Lipid Panel; Future; Expected date: 09/13/2022  -     Comprehensive Metabolic Panel; Future; Expected date: 09/13/2022    Statin intolerance  -     cholestyramine (QUESTRAN) 4 gram packet; Take 1 packet (4 g total) by mouth 3 (three) times daily with meals.  Dispense: 270 packet; Refill: 3  -     Cancel: Lipid Panel; Future; Expected date: 09/13/2022  -     Cancel: Comprehensive Metabolic Panel; Future; Expected date: 09/13/2022  -     Lipid Panel; Future; Expected date: 09/13/2022  -     Comprehensive Metabolic Panel; Future; Expected date: 09/13/2022    Coronary artery disease involving native coronary artery of native heart without angina pectoris: has been stable. Sees Dr Rachel as card.     Impaired glucose tolerance; Exercise recommended with weight reduction and low carb diet; we'll follow hemoglobin A1c's with you periodically.  -     Hemoglobin A1C; Future; Expected date: 09/13/2022  -     Comprehensive Metabolic  Panel; Future; Expected date: 09/13/2022    Morbid obesity; Caloric restriction w regular exercise and weight reduction. Gained 6 1/2 lbs w recent vacation; to watch her weight and Na intake a lot closer.    -     Hemoglobin A1C; Future; Expected date: 09/13/2022  -     Lipid Panel; Future; Expected date: 09/13/2022  -     Comprehensive Metabolic Panel; Future; Expected date: 09/13/2022    Bilateral lower extremity edema; Maintain less than 2 g sodium diet; elevate lower extremities at home; use compression stockings if possible. Increase HCTZ to 12.5 mg daily.   -     Comprehensive Metabolic Panel; Future; Expected date: 09/13/2022  -     Magnesium; Future; Expected date: 09/13/2022    Encounter for laboratory test: labs reviewed w pt and ordered for f/u.

## 2022-09-13 NOTE — PROGRESS NOTES
Subjective:       Patient ID: Lanie Cavazos is a 77 y.o. female.    Chief Complaint: Follow-up (Blood work)  Here for reassessment as well as review of her lab work.  Follow-up  Pertinent negatives include no abdominal pain, arthralgias, chest pain, congestion, coughing, fever, myalgias, nausea, rash or vomiting.   Benign essential HTN; Maintain < 2 Gm Na a day diet, and monitor BP at home; keep a log. Increase HCTZ to 12.5 mg daily from 4x a week.  Sit for at least 5 minutes with feet flat on the ground before running blood pressure cuff in the morning.  Blood pressure at home has been averaging 135/79.  Manually here today she is 136/80 with pulse 55.  Increase HCTZ to daily.  -     Lipid Panel; Future; Expected date: 09/13/2022  -     Comprehensive Metabolic Panel; Future; Expected date: 09/13/2022  -     Magnesium; Future; Expected date: 09/13/2022  -     CBC Auto Differential; Future; Expected date: 09/13/2022  Other hyperlipidemia: Maintain low fat high fiber diet, exercise regularly. Weight reduction where indicated. Change Questran to 3x a day. Adhere to diet better. Exercise more when foot better.  Lipid profile:  Cholesterol 214 improved from 02/27; triglyceride 103, HDL 53,  with goal less than 70.  LDL has improved from 158.6. Stressed the importance of continuing to take Zetia 10 mg per day unless side effects surface then she is to contact us.  Also stressed the importance of adhering to her diet a lot closer and continue to work at weight reduction with light exercise as tolerated;  patient with morbid obesity with BMI 42.81.  Has gained 6-1/2 lb since 07/06/2022  -     cholestyramine (QUESTRAN) 4 gram packet; Take 1 packet (4 g total) by mouth 3 (three) times daily with meals.  Dispense: 270 packet; Refill: 3  -     Lipid Panel; Future; Expected date: 09/13/2022  -     Comprehensive Metabolic Panel; Future; Expected date: 09/13/2022  Statin intolerance  -     cholestyramine (QUESTRAN) 4 gram  packet; Take 1 packet (4 g total) by mouth 3 (three) times daily with meals.  Dispense: 270 packet; Refill: 3  -     Lipid Panel; Future; Expected date: 09/13/2022  -     Comprehensive Metabolic Panel; Future; Expected date: 09/13/2022  Coronary artery disease involving native coronary artery of native heart without angina pectoris: has been stable. Sees Dr aRchel as card.   Impaired glucose tolerance; Exercise recommended with weight reduction and low carb diet; we'll follow hemoglobin A1c's with you periodically.  -     Hemoglobin A1C; Future; Expected date: 09/13/2022  -     Comprehensive Metabolic Panel; Future; Expected date: 09/13/2022  Morbid obesity; Caloric restriction w regular exercise and weight reduction. Gained 6 1/2 lbs w recent vacation; to watch her weight and Na intake a lot closer.  And continue light exercise as tolerated.  -     Hemoglobin A1C; Future; Expected date: 09/13/2022  -     Lipid Panel; Future; Expected date: 09/13/2022  -     Comprehensive Metabolic Panel; Future; Expected date: 09/13/2022  Bilateral lower extremity edema; Maintain less than 2 g sodium diet; elevate lower extremities at home; use compression stockings if possible. Increase HCTZ to 12.5 mg daily.  On Lasix 20 mg per day and HCTZ 12.5 mg every Tuesday Thursday Saturday and Sunday  -     Comprehensive Metabolic Panel; Future; Expected date: 09/13/2022  -     Magnesium; Future; Expected date: 09/13/2022  Encounter for laboratory test: labs reviewed w pt and ordered for f/u.   Total time 9:08 a.m. through 9:48 a.m. greater than 50% of the time spent in discussion, counseling, and review.  Labs reviewed and discussed and ordered for follow-up.  Medications reviewed and addressed.  Sees various different topics discussed including plan of care.      Review of Systems   Constitutional:  Negative for appetite change and fever.   HENT:  Negative for congestion, postnasal drip, rhinorrhea and sinus pressure.    Eyes:  Negative  "for discharge and itching.   Respiratory:  Negative for cough, chest tightness, shortness of breath and wheezing.         LE edema doing better.    Cardiovascular:  Positive for leg swelling. Negative for chest pain and palpitations.   Gastrointestinal:  Negative for abdominal distention, abdominal pain, blood in stool, constipation, diarrhea, nausea and vomiting.   Endocrine: Negative for polydipsia, polyphagia and polyuria.   Genitourinary:  Negative for dysuria and hematuria.   Musculoskeletal:  Negative for arthralgias and myalgias.   Skin:  Negative for rash.   Allergic/Immunologic: Negative for environmental allergies and food allergies.   Neurological:  Negative for tremors, seizures and syncope.   Hematological:  Negative for adenopathy. Does not bruise/bleed easily.      Objective:        Vitals:    09/13/22 0826   BP: 136/80   Pulse: (!) 55   SpO2: 96%   Weight: 116.7 kg (257 lb 4.4 oz)   Height: 5' 5" (1.651 m)       BMI Readings from Last 3 Encounters:   09/13/22 42.81 kg/m²   07/06/22 41.71 kg/m²   02/23/22 42.17 kg/m²        Wt Readings from Last 3 Encounters:   09/13/22 0826 116.7 kg (257 lb 4.4 oz)   07/06/22 0903 113.7 kg (250 lb 10.6 oz)   02/23/22 0933 114.9 kg (253 lb 6.7 oz)        BP Readings from Last 3 Encounters:   09/13/22 136/80   07/06/22 130/75   02/23/22 130/75        There are no preventive care reminders to display for this patient.     Health Maintenance Due   Topic Date Due    COVID-19 Vaccine (1) Never done    Shingles Vaccine (1 of 2) Never done         Past Medical History:   Diagnosis Date    Carotid artery stenosis     Coronary artery disease     Hyperlipidemia     Hypertension     Obesity     Paroxysmal supraventricular tachycardia        Past Surgical History:   Procedure Laterality Date    APPENDECTOMY      BREAST BIOPSY Left     negative  needle asp  by Dr Olmedo    CARPAL TUNNEL RELEASE      CATARACT EXTRACTION BILATERAL W/ ANTERIOR VITRECTOMY      COLONOSCOPY N/A " 12/18/2015    Procedure: COLONOSCOPY;  Surgeon: Timothy Syed Jr., MD;  Location: [facility];  Service: Endoscopy;  Laterality: N/A;repeatb in 3 yrs. Benign polyps w possible adenomatous change in transverse colon polyp..    EYE SURGERY      HAND SURGERY  01/01/2001    HYSTERECTOMY  1979    without BSO.    toenail extraction Right 2016    4th toe.       Social History     Tobacco Use    Smoking status: Never    Smokeless tobacco: Never   Substance Use Topics    Alcohol use: No     Comment: rarely.    Drug use: No       Family History   Problem Relation Age of Onset    Cancer Mother     Cancer Father     COPD Son     Ovarian cancer Neg Hx     Breast cancer Neg Hx        Review of patient's allergies indicates:   Allergen Reactions    Crestor [rosuvastatin]      Muscle ache    Lipitor [atorvastatin]      Muscle ache    Pravachol [pravastatin]      Muscle ache    Sulfa (sulfonamide antibiotics) Swelling    Welchol [colesevelam]      Muscle ache    Zocor [simvastatin]      Muscle ache    Influenza virus vaccines Swelling and Rash     2014 flu vaccination       Current Outpatient Medications on File Prior to Visit   Medication Sig Dispense Refill    acetaminophen (TYLENOL) 500 MG tablet Take 500 mg by mouth 3 (three) times daily as needed for Pain.      amLODIPine (NORVASC) 2.5 MG tablet TAKE 1 TABLET BY MOUTH EVERY DAY 90 tablet 1    ascorbic acid, vitamin C, (VITAMIN C) 500 MG tablet Take 500 mg by mouth once daily.      aspirin (ECOTRIN) 81 MG EC tablet Take 81 mg by mouth once daily. Three times a week Monday, Wednesday, Friday.      calcium-vitamin D 500-125 mg-unit tablet Take 1 tablet by mouth once daily.      cephALEXin (KEFLEX) 500 MG capsule Take 500 mg by mouth 3 (three) times daily.      diclofenac sodium (VOLTAREN) 1 % Gel Apply 2 g topically once daily. 100 g 0    ezetimibe (ZETIA) 10 mg tablet Take 1 tablet (10 mg total) by mouth every evening. Generic please. 90 tablet 3    furosemide (LASIX) 20 MG tablet  TAKE 1 TABLET BY MOUTH EVERY DAY 90 tablet 1    GLUCOSAMINE HCL/CHONDR VILLASENOR A NA (OSTEO BI-FLEX ORAL) Take by mouth once daily.      hydroCHLOROthiazide (HYDRODIURIL) 12.5 MG Tab TAKE 1 TABLET BY MOUTH EVERY TUESDAY, THURSDAY, SATURDAY, AND SUNDAY. 90 tablet 3    methylcellulose, laxative, 500 mg Tab Take by mouth once daily.      metoprolol succinate (TOPROL-XL) 25 MG 24 hr tablet Take 1 tablet (25 mg total) by mouth once daily. 30 tablet 11    multivitamin (THERAGRAN) per tablet Take 1 tablet by mouth once daily.      mv-min-FA-D3-om3-dha-epa-fish 200 mcg-500 unit-200 mg Cap Take 1 tablet by mouth once daily.       nystatin (MYCOSTATIN) cream Apply topically 2 (two) times daily. 30 g 2    potassium chloride (MICRO-K) 10 MEQ CpSR TAKE 1 CAPSULE (10 MEQ TOTAL) BY MOUTH ONCE DAILY. 90 capsule 2    terconazole (TERAZOL 7) 0.4 % Crea Place 1 applicator vaginally every evening. 45 g 4    triamcinolone (NASACORT) 55 mcg nasal inhaler 2 sprays by Nasal route once daily.      UBIDECARENONE (COENZYME Q10) 100 mg Tab Take 100 mg by mouth once daily.      vitamin E 400 UNIT capsule Take 400 Units by mouth once daily.      VITAMIN E/FLAXSEED OIL (FLAXSEED OIL-VITAMIN E) 1,000-1 mg-unit Cap Take 1,000 mg by mouth once daily.      [DISCONTINUED] cholestyramine (QUESTRAN) 4 gram packet 4 gm packet w meals and 8 oz water; 3x a day; geheric (Patient taking differently: 2 (two) times daily. 4 gm packet w meals and 8 oz water; 2x a day; generic) 270 packet 1     Current Facility-Administered Medications on File Prior to Visit   Medication Dose Route Frequency Provider Last Rate Last Admin    hyaluronate sodium, stabilized (MONOVISC) Syrg   Intra-articular 1 time in Clinic/HOD Rolf Bob MD           Physical Exam  Vitals reviewed.   Constitutional:       Appearance: She is well-developed.   HENT:      Head: Normocephalic and atraumatic.   Neck:      Thyroid: No thyromegaly.   Cardiovascular:      Rate and Rhythm: Normal rate  and regular rhythm.      Heart sounds: No murmur heard.    No gallop.      Comments: HOMER 2/6 aortic/pulm.   Pulmonary:      Effort: Pulmonary effort is normal. No respiratory distress.      Breath sounds: Normal breath sounds. No wheezing or rales.   Abdominal:      General: Bowel sounds are normal. There is no distension.      Palpations: Abdomen is soft.      Tenderness: There is no abdominal tenderness. There is no guarding or rebound.   Musculoskeletal:         General: Normal range of motion.      Cervical back: Normal range of motion and neck supple.      Right lower leg: Edema present.      Left lower leg: Edema present.      Comments: 3+ jony LE edema w no calf tenderness to palp. Spider veins noted.    Lymphadenopathy:      Cervical: No cervical adenopathy.   Skin:     Findings: No rash.   Neurological:      Mental Status: She is alert and oriented to person, place, and time.      Comments: Moves all 4 extremities fine.   Psychiatric:         Behavior: Behavior normal.         Thought Content: Thought content normal.       Assessment:       1. Benign essential HTN    2. Other hyperlipidemia    3. Statin intolerance    4. Coronary artery disease involving native coronary artery of native heart without angina pectoris    5. Impaired glucose tolerance    6. Morbid obesity    7. Bilateral lower extremity edema    8. Encounter for laboratory test        Plan:       Benign essential HTN; Maintain < 2 Gm Na a day diet, and monitor BP at home; keep a log. Increase HCTZ to 12.5 mg daily from 4x a week.  Sit for at least 5 minutes with feet flat on the ground before running blood pressure cuff in the morning.  Blood pressure at home has been averaging 135/79.  Manually here today she is 136/80 with pulse 55.  Increase HCTZ to daily.  -     Cancel: Comprehensive Metabolic Panel; Future; Expected date: 09/13/2022  -     Cancel: Magnesium; Future; Expected date: 09/13/2022  -     Lipid Panel; Future; Expected date:  09/13/2022  -     Comprehensive Metabolic Panel; Future; Expected date: 09/13/2022  -     Magnesium; Future; Expected date: 09/13/2022  -     CBC Auto Differential; Future; Expected date: 09/13/2022    Other hyperlipidemia: Maintain low fat high fiber diet, exercise regularly. Weight reduction where indicated. Change Questran to 3x a day. Adhere to diet better. Exercise more when foot better.  To take a Zetia 10 mg p.o. daily unless side effects occur then she is to call us needs to adhere to her diet a lot closer and to include light exercise as tolerated.  Smaller portions with weight reduction continued attempts will help  -     cholestyramine (QUESTRAN) 4 gram packet; Take 1 packet (4 g total) by mouth 3 (three) times daily with meals.  Dispense: 270 packet; Refill: 3  -     Lipid Panel; Future; Expected date: 09/13/2022  -     Comprehensive Metabolic Panel; Future; Expected date: 09/13/2022    Statin intolerance  -     cholestyramine (QUESTRAN) 4 gram packet; Take 1 packet (4 g total) by mouth 3 (three) times daily with meals.  Dispense: 270 packet; Refill: 3  -     Lipid Panel; Future; Expected date: 09/13/2022  -     Comprehensive Metabolic Panel; Future; Expected date: 09/13/2022    Coronary artery disease involving native coronary artery of native heart without angina pectoris: has been stable. Sees Dr Rachel as card.     Impaired glucose tolerance; Exercise recommended with weight reduction and low carb diet; we'll follow hemoglobin A1c's with you periodically.  5/24 hemoglobin A1c 5.4; 9/7 fasting blood sugar 90.  -     Hemoglobin A1C; Future; Expected date: 09/13/2022  -     Comprehensive Metabolic Panel; Future; Expected date: 09/13/2022    Morbid obesity; Caloric restriction w regular exercise and weight reduction. Gained 6 1/2 lbs w recent vacation; to watch her weight and Na intake a lot closer.  And continue light exercise as tolerated.  -     Hemoglobin A1C; Future; Expected date: 09/13/2022  -      Lipid Panel; Future; Expected date: 09/13/2022  -     Comprehensive Metabolic Panel; Future; Expected date: 09/13/2022    Bilateral lower extremity edema; Maintain less than 2 g sodium diet; elevate lower extremities at home; use compression stockings if possible. Increase HCTZ to 12.5 mg daily.   -     Comprehensive Metabolic Panel; Future; Expected date: 09/13/2022  -     Magnesium; Future; Expected date: 09/13/2022    Encounter for laboratory test: labs reviewed w pt and ordered for f/u.

## 2022-09-27 ENCOUNTER — TELEPHONE (OUTPATIENT)
Dept: ADMINISTRATIVE | Facility: CLINIC | Age: 77
End: 2022-09-27
Payer: MEDICARE

## 2022-09-29 ENCOUNTER — OFFICE VISIT (OUTPATIENT)
Dept: FAMILY MEDICINE | Facility: CLINIC | Age: 77
End: 2022-09-29
Payer: MEDICARE

## 2022-09-29 VITALS
WEIGHT: 251.13 LBS | HEIGHT: 65 IN | OXYGEN SATURATION: 97 % | BODY MASS INDEX: 41.84 KG/M2 | SYSTOLIC BLOOD PRESSURE: 134 MMHG | DIASTOLIC BLOOD PRESSURE: 78 MMHG | HEART RATE: 52 BPM

## 2022-09-29 DIAGNOSIS — I25.10 CORONARY ARTERY DISEASE INVOLVING NATIVE CORONARY ARTERY OF NATIVE HEART WITHOUT ANGINA PECTORIS: ICD-10-CM

## 2022-09-29 DIAGNOSIS — E78.49 OTHER HYPERLIPIDEMIA: ICD-10-CM

## 2022-09-29 DIAGNOSIS — I47.10 PSVT (PAROXYSMAL SUPRAVENTRICULAR TACHYCARDIA): ICD-10-CM

## 2022-09-29 DIAGNOSIS — E66.01 MORBID OBESITY: ICD-10-CM

## 2022-09-29 DIAGNOSIS — Z00.00 ENCOUNTER FOR PREVENTIVE HEALTH EXAMINATION: Primary | ICD-10-CM

## 2022-09-29 DIAGNOSIS — I70.0 AORTIC ATHEROSCLEROSIS: ICD-10-CM

## 2022-09-29 DIAGNOSIS — I10 BENIGN ESSENTIAL HTN: ICD-10-CM

## 2022-09-29 DIAGNOSIS — R26.9 ABNORMALITY OF GAIT AND MOBILITY: ICD-10-CM

## 2022-09-29 PROCEDURE — 1125F AMNT PAIN NOTED PAIN PRSNT: CPT | Mod: CPTII,S$GLB,, | Performed by: NURSE PRACTITIONER

## 2022-09-29 PROCEDURE — 1160F RVW MEDS BY RX/DR IN RCRD: CPT | Mod: CPTII,S$GLB,, | Performed by: NURSE PRACTITIONER

## 2022-09-29 PROCEDURE — 1159F PR MEDICATION LIST DOCUMENTED IN MEDICAL RECORD: ICD-10-PCS | Mod: CPTII,S$GLB,, | Performed by: NURSE PRACTITIONER

## 2022-09-29 PROCEDURE — 1101F PT FALLS ASSESS-DOCD LE1/YR: CPT | Mod: CPTII,S$GLB,, | Performed by: NURSE PRACTITIONER

## 2022-09-29 PROCEDURE — 3288F PR FALLS RISK ASSESSMENT DOCUMENTED: ICD-10-PCS | Mod: CPTII,S$GLB,, | Performed by: NURSE PRACTITIONER

## 2022-09-29 PROCEDURE — 1159F MED LIST DOCD IN RCRD: CPT | Mod: CPTII,S$GLB,, | Performed by: NURSE PRACTITIONER

## 2022-09-29 PROCEDURE — 99999 PR PBB SHADOW E&M-EST. PATIENT-LVL V: CPT | Mod: PBBFAC,,, | Performed by: NURSE PRACTITIONER

## 2022-09-29 PROCEDURE — 99999 PR PBB SHADOW E&M-EST. PATIENT-LVL V: ICD-10-PCS | Mod: PBBFAC,,, | Performed by: NURSE PRACTITIONER

## 2022-09-29 PROCEDURE — 3075F SYST BP GE 130 - 139MM HG: CPT | Mod: CPTII,S$GLB,, | Performed by: NURSE PRACTITIONER

## 2022-09-29 PROCEDURE — 1160F PR REVIEW ALL MEDS BY PRESCRIBER/CLIN PHARMACIST DOCUMENTED: ICD-10-PCS | Mod: CPTII,S$GLB,, | Performed by: NURSE PRACTITIONER

## 2022-09-29 PROCEDURE — 1125F PR PAIN SEVERITY QUANTIFIED, PAIN PRESENT: ICD-10-PCS | Mod: CPTII,S$GLB,, | Performed by: NURSE PRACTITIONER

## 2022-09-29 PROCEDURE — 99499 UNLISTED E&M SERVICE: CPT | Mod: S$GLB,,, | Performed by: NURSE PRACTITIONER

## 2022-09-29 PROCEDURE — 3078F DIAST BP <80 MM HG: CPT | Mod: CPTII,S$GLB,, | Performed by: NURSE PRACTITIONER

## 2022-09-29 PROCEDURE — G0439 PPPS, SUBSEQ VISIT: HCPCS | Mod: S$GLB,,, | Performed by: NURSE PRACTITIONER

## 2022-09-29 PROCEDURE — 3078F PR MOST RECENT DIASTOLIC BLOOD PRESSURE < 80 MM HG: ICD-10-PCS | Mod: CPTII,S$GLB,, | Performed by: NURSE PRACTITIONER

## 2022-09-29 PROCEDURE — 99499 RISK ADDL DX/OHS AUDIT: ICD-10-PCS | Mod: S$GLB,,, | Performed by: NURSE PRACTITIONER

## 2022-09-29 PROCEDURE — G0439 PR MEDICARE ANNUAL WELLNESS SUBSEQUENT VISIT: ICD-10-PCS | Mod: S$GLB,,, | Performed by: NURSE PRACTITIONER

## 2022-09-29 PROCEDURE — 1101F PR PT FALLS ASSESS DOC 0-1 FALLS W/OUT INJ PAST YR: ICD-10-PCS | Mod: CPTII,S$GLB,, | Performed by: NURSE PRACTITIONER

## 2022-09-29 PROCEDURE — 3288F FALL RISK ASSESSMENT DOCD: CPT | Mod: CPTII,S$GLB,, | Performed by: NURSE PRACTITIONER

## 2022-09-29 PROCEDURE — 3075F PR MOST RECENT SYSTOLIC BLOOD PRESS GE 130-139MM HG: ICD-10-PCS | Mod: CPTII,S$GLB,, | Performed by: NURSE PRACTITIONER

## 2022-09-29 NOTE — PATIENT INSTRUCTIONS
Counseling and Referral of Other Preventative  (Italic type indicates deductible and co-insurance are waived)    Patient Name: Lanie Cavazos  Today's Date: 9/29/2022    Health Maintenance       Date Due Completion Date    COVID-19 Vaccine (1) Never done ---    Shingles Vaccine (1 of 2) Never done ---    Lipid Panel 09/07/2023 9/7/2022    Aspirin/Antiplatelet Therapy 09/13/2023 9/13/2022    DEXA Scan 07/20/2024 7/20/2021    TETANUS VACCINE 04/06/2029 4/6/2019        No orders of the defined types were placed in this encounter.    The following information is provided to all patients.  This information is to help you find resources for any of the problems found today that may be affecting your health:                Living healthy guide: www.[facility].gov      Understanding Diabetes: www.diabetes.org      Eating healthy: www.cdc.gov/healthyweight      [facility] home safety checklist: www.cdc.gov/steadi/patient.html      Agency on Aging: www.goea.[place].[facility]      Alcoholics anonymous (AA): www.aa.org      Physical Activity: www.robyn.nih.gov/so3ahhr      Tobacco use: www.quitwithusla.org

## 2022-09-29 NOTE — PROGRESS NOTES
"  Lanie Cavazos presented for a  Medicare AWV and comprehensive Health Risk Assessment today. The following components were reviewed and updated:    Medical history  Family History  Social history  Allergies and Current Medications  Health Risk Assessment  Health Maintenance  Care Team         ** See Completed Assessments for Annual Wellness Visit within the encounter summary.**         The following assessments were completed:  Living Situation  CAGE  Depression Screening  Timed Get Up and Go  Whisper Test  Cognitive Function Screening      Nutrition Screening  ADL Screening  PAQ Screening    Review for Opioid Screening: Patient does not have rx for Opioids.    Review for Substance Use Disorders: Patient does not use substance.      Vitals:    09/29/22 1020   BP: 134/78   BP Location: Left arm   Patient Position: Sitting   BP Method: Medium (Manual)   Pulse: (!) 52   SpO2: 97%   Weight: 113.9 kg (251 lb 1.7 oz)   Height: 5' 5" (1.651 m)     Body mass index is 41.79 kg/m².  Physical Exam  Vitals reviewed.   HENT:      Head: Normocephalic.   Cardiovascular:      Rate and Rhythm: Bradycardia present.   Pulmonary:      Effort: Pulmonary effort is normal. No respiratory distress.   Skin:     General: Skin is warm.   Neurological:      Mental Status: She is alert.   Psychiatric:         Mood and Affect: Mood normal.             Diagnoses and health risks identified today and associated recommendations/orders:    1. Encounter for preventive health examination  Reviewed health maintenance and provided recommendations    Recommend shingrix and COVID booster    2. Abnormality of gait and mobility  Continue to monitor  Followed by Brett Garcia MD .      3. PSVT (paroxysmal supraventricular tachycardia)  Continue to monitor  Followed by Dr arena.      4. Morbid obesity  Continue to monitor  Followed by Brett Garcia MD   Encourage healthy food chocies.      5. Aortic atherosclerosis  Continue to monitor  Followed by Dr" Arena  CXR 3/5/21.      6. Coronary artery disease involving native coronary artery of native heart without angina pectoris  Continue to monitor  Followed by Dr Virginie Lopez CP.      7. Benign essential HTN  Continue to monitor  Followed by Brett Garcia MD .      8. Other hyperlipidemia  Continue to monitor  Followed by Brett Garcia MD .        Provided Lanie with a 5-10 year written screening schedule and personal prevention plan. Recommendations were developed using the USPSTF age appropriate recommendations. Education, counseling, and referrals were provided as needed. After Visit Summary printed and given to patient which includes a list of additional screenings\tests needed.    Follow up in one year    Alice Tobin NP  I offered to discuss advanced care planning, including how to pick a person who would make decisions for you if you were unable to make them for yourself, called a health care power of , and what kind of decisions you might make such as use of life sustaining treatments such as ventilators and tube feeding when faced with a life limiting illness recorded on a living will that they will need to know. (How you want to be cared for as you near the end of your natural life)     X Patient is interested in learning more about how to make advanced directives.  I provided them paperwork and offered to discuss this with them.

## 2022-10-02 DIAGNOSIS — R60.0 LOCALIZED EDEMA: ICD-10-CM

## 2022-10-02 RX ORDER — FUROSEMIDE 20 MG/1
TABLET ORAL
Qty: 90 TABLET | Refills: 3 | Status: SHIPPED | OUTPATIENT
Start: 2022-10-02 | End: 2022-10-18 | Stop reason: SDUPTHER

## 2022-10-02 NOTE — TELEPHONE ENCOUNTER
No new care gaps identified.  [facility] Embedded Care Gaps. Reference number: [id number]. 10/02/2022   12:14:43 AM [id number]

## 2022-10-03 NOTE — TELEPHONE ENCOUNTER
Refill Decision Note   Lanie Cavazos  is requesting a refill authorization.  Brief Assessment and Rationale for Refill:  Approve     Medication Therapy Plan:       Medication Reconciliation Completed: No   Comments:     No Care Gaps recommended.     Note composed:11:42 PM 10/02/2022

## 2022-10-09 PROBLEM — I70.0 AORTIC ATHEROSCLEROSIS: Status: ACTIVE | Noted: 2022-10-09

## 2022-10-18 RX ORDER — FUROSEMIDE 20 MG/1
20 TABLET ORAL DAILY
Qty: 90 TABLET | Refills: 1 | Status: SHIPPED | OUTPATIENT
Start: 2022-10-18 | End: 2023-02-02 | Stop reason: SDUPTHER

## 2022-11-05 DIAGNOSIS — R60.0 BILATERAL LOWER EXTREMITY EDEMA: ICD-10-CM

## 2022-11-05 RX ORDER — POTASSIUM CHLORIDE 750 MG/1
CAPSULE, EXTENDED RELEASE ORAL
Qty: 90 CAPSULE | Refills: 3 | Status: SHIPPED | OUTPATIENT
Start: 2022-11-05 | End: 2022-11-05 | Stop reason: SDUPTHER

## 2022-11-05 RX ORDER — POTASSIUM CHLORIDE 750 MG/1
10 CAPSULE, EXTENDED RELEASE ORAL DAILY
Qty: 90 CAPSULE | Refills: 1 | Status: SHIPPED | OUTPATIENT
Start: 2022-11-05

## 2022-11-05 NOTE — TELEPHONE ENCOUNTER
No new care gaps identified.  [facility] Embedded Care Gaps. Reference number: [id number]. 11/05/2022   12:16:59 AM CDT

## 2022-11-05 NOTE — TELEPHONE ENCOUNTER
Refill Authorization Note   Lanie Cavazos  is requesting a refill authorization.  Brief Assessment and Rationale for Refill:  Approve     Medication Therapy Plan:       Medication Reconciliation Completed: No   Comments:     No Care Gaps Recommended     Note composed:12:43 PM 11/05/2022

## 2022-12-07 ENCOUNTER — TELEPHONE (OUTPATIENT)
Dept: FAMILY MEDICINE | Facility: CLINIC | Age: 77
End: 2022-12-07
Payer: MEDICARE

## 2022-12-07 NOTE — TELEPHONE ENCOUNTER
----- Message from Saul Santiago sent at 12/7/2022  3:21 PM CST -----  Type:  Patient Returning Call    Who Called:  Pt  Who Left Message for Patient:  Sandra  Does the patient know what this is regarding?:  appt   Best Call Back Number:  [phone]    Additional Information:  Sts that she needs the appt for this year due to deductible having been met already and also ins is changing next year.  Please advise -- thank you

## 2022-12-08 ENCOUNTER — TELEPHONE (OUTPATIENT)
Dept: FAMILY MEDICINE | Facility: CLINIC | Age: 77
End: 2022-12-08
Payer: MEDICARE

## 2022-12-08 NOTE — TELEPHONE ENCOUNTER
----- Message from Kaelyn Montoya sent at 12/8/2022 10:50 AM CST -----  Contact: pt  Type:  Sooner Appointment Request    Caller is requesting a sooner appointment.  Caller declined first available appointment listed below.  Caller will not accept being placed on the waitlist and is requesting a message be sent to doctor.    Name of Caller:  pt   When is the first available appointment?  01/30  Symptoms:  follow up   Best Call Back Number:  [phone]    Additional Information:  pt needs a sooner appt. Please advise.

## 2023-01-01 DIAGNOSIS — R79.89 PRERENAL AZOTEMIA: ICD-10-CM

## 2023-01-01 DIAGNOSIS — I10 BENIGN ESSENTIAL HTN: ICD-10-CM

## 2023-01-01 RX ORDER — HYDROCHLOROTHIAZIDE 12.5 MG/1
TABLET ORAL
Qty: 48 TABLET | Refills: 2 | Status: SHIPPED | OUTPATIENT
Start: 2023-01-01 | End: 2023-10-04

## 2023-01-01 RX ORDER — AMLODIPINE BESYLATE 2.5 MG/1
TABLET ORAL
Qty: 90 TABLET | Refills: 2 | Status: SHIPPED | OUTPATIENT
Start: 2023-01-01 | End: 2023-09-21

## 2023-01-01 NOTE — TELEPHONE ENCOUNTER
No new care gaps identified.  [facility] Embedded Care Gaps. Reference number: [id number]. 1/01/2023   1:33:45 AM CST

## 2023-01-02 NOTE — TELEPHONE ENCOUNTER
Refill Decision Note   Lanie Cavazos  is requesting a refill authorization.  Brief Assessment and Rationale for Refill:  Approve     Medication Therapy Plan:       Medication Reconciliation Completed: No   Comments:     No Care Gaps recommended.     Note composed:6:08 PM 01/01/2023

## 2023-01-27 ENCOUNTER — LAB VISIT (OUTPATIENT)
Dept: LAB | Facility: HOSPITAL | Age: 78
End: 2023-01-27
Attending: INTERNAL MEDICINE
Payer: MEDICARE

## 2023-01-27 DIAGNOSIS — E66.01 MORBID OBESITY: ICD-10-CM

## 2023-01-27 DIAGNOSIS — Z78.9 STATIN INTOLERANCE: ICD-10-CM

## 2023-01-27 DIAGNOSIS — R73.02 IMPAIRED GLUCOSE TOLERANCE: ICD-10-CM

## 2023-01-27 DIAGNOSIS — R60.0 BILATERAL LOWER EXTREMITY EDEMA: ICD-10-CM

## 2023-01-27 DIAGNOSIS — I10 BENIGN ESSENTIAL HTN: ICD-10-CM

## 2023-01-27 DIAGNOSIS — E78.49 OTHER HYPERLIPIDEMIA: ICD-10-CM

## 2023-01-27 LAB
BASOPHILS # BLD AUTO: 0.04 K/UL (ref 0–0.2)
BASOPHILS NFR BLD: 0.5 % (ref 0–1.9)
DIFFERENTIAL METHOD: ABNORMAL
EOSINOPHIL # BLD AUTO: 0.2 K/UL (ref 0–0.5)
EOSINOPHIL NFR BLD: 2.6 % (ref 0–8)
ERYTHROCYTE [DISTWIDTH] IN BLOOD BY AUTOMATED COUNT: 14.2 % (ref 11.5–14.5)
ESTIMATED AVG GLUCOSE: 105 MG/DL (ref 68–131)
HBA1C MFR BLD: 5.3 % (ref 4–5.6)
HCT VFR BLD AUTO: 36.7 % (ref 37–48.5)
HGB BLD-MCNC: 11.8 G/DL (ref 12–16)
IMM GRANULOCYTES # BLD AUTO: 0.03 K/UL (ref 0–0.04)
IMM GRANULOCYTES NFR BLD AUTO: 0.4 % (ref 0–0.5)
LYMPHOCYTES # BLD AUTO: 1.4 K/UL (ref 1–4.8)
LYMPHOCYTES NFR BLD: 19.6 % (ref 18–48)
MCH RBC QN AUTO: 28 PG (ref 27–31)
MCHC RBC AUTO-ENTMCNC: 32.2 G/DL (ref 32–36)
MCV RBC AUTO: 87 FL (ref 82–98)
MONOCYTES # BLD AUTO: 0.7 K/UL (ref 0.3–1)
MONOCYTES NFR BLD: 8.9 % (ref 4–15)
NEUTROPHILS # BLD AUTO: 5 K/UL (ref 1.8–7.7)
NEUTROPHILS NFR BLD: 68 % (ref 38–73)
NRBC BLD-RTO: 0 /100 WBC
PLATELET # BLD AUTO: 235 K/UL (ref 150–450)
PMV BLD AUTO: 11.3 FL (ref 9.2–12.9)
RBC # BLD AUTO: 4.21 M/UL (ref 4–5.4)
WBC # BLD AUTO: 7.33 K/UL (ref 3.9–12.7)

## 2023-01-27 PROCEDURE — 83735 ASSAY OF MAGNESIUM: CPT | Mod: HCNC | Performed by: INTERNAL MEDICINE

## 2023-01-27 PROCEDURE — 80061 LIPID PANEL: CPT | Mod: HCNC | Performed by: INTERNAL MEDICINE

## 2023-01-27 PROCEDURE — 80053 COMPREHEN METABOLIC PANEL: CPT | Mod: HCNC | Performed by: INTERNAL MEDICINE

## 2023-01-27 PROCEDURE — 85025 COMPLETE CBC W/AUTO DIFF WBC: CPT | Mod: HCNC | Performed by: INTERNAL MEDICINE

## 2023-01-27 PROCEDURE — 36415 COLL VENOUS BLD VENIPUNCTURE: CPT | Mod: HCNC,PO | Performed by: INTERNAL MEDICINE

## 2023-01-27 PROCEDURE — 83036 HEMOGLOBIN GLYCOSYLATED A1C: CPT | Mod: HCNC | Performed by: INTERNAL MEDICINE

## 2023-01-28 LAB
ALBUMIN SERPL BCP-MCNC: 3.7 G/DL (ref 3.5–5.2)
ALP SERPL-CCNC: 85 U/L (ref 55–135)
ALT SERPL W/O P-5'-P-CCNC: 5 U/L (ref 10–44)
ANION GAP SERPL CALC-SCNC: 8 MMOL/L (ref 8–16)
AST SERPL-CCNC: 11 U/L (ref 10–40)
BILIRUB SERPL-MCNC: 0.6 MG/DL (ref 0.1–1)
BUN SERPL-MCNC: 19 MG/DL (ref 8–23)
CALCIUM SERPL-MCNC: 9.4 MG/DL (ref 8.7–10.5)
CHLORIDE SERPL-SCNC: 108 MMOL/L (ref 95–110)
CHOLEST SERPL-MCNC: 248 MG/DL (ref 120–199)
CHOLEST/HDLC SERPL: 5.1 {RATIO} (ref 2–5)
CO2 SERPL-SCNC: 24 MMOL/L (ref 23–29)
CREAT SERPL-MCNC: 0.7 MG/DL (ref 0.5–1.4)
EST. GFR  (NO RACE VARIABLE): >60 ML/MIN/1.73 M^2
GLUCOSE SERPL-MCNC: 95 MG/DL (ref 70–110)
HDLC SERPL-MCNC: 49 MG/DL (ref 40–75)
HDLC SERPL: 19.8 % (ref 20–50)
LDLC SERPL CALC-MCNC: 179.6 MG/DL (ref 63–159)
MAGNESIUM SERPL-MCNC: 1.9 MG/DL (ref 1.6–2.6)
NONHDLC SERPL-MCNC: 199 MG/DL
POTASSIUM SERPL-SCNC: 3.9 MMOL/L (ref 3.5–5.1)
PROT SERPL-MCNC: 7 G/DL (ref 6–8.4)
SODIUM SERPL-SCNC: 140 MMOL/L (ref 136–145)
TRIGL SERPL-MCNC: 97 MG/DL (ref 30–150)

## 2023-01-31 ENCOUNTER — HOSPITAL ENCOUNTER (OUTPATIENT)
Dept: RADIOLOGY | Facility: HOSPITAL | Age: 78
Discharge: HOME | End: 2023-01-31
Attending: INTERNAL MEDICINE
Payer: MEDICARE

## 2023-01-31 DIAGNOSIS — Z12.31 ENCOUNTER FOR SCREENING MAMMOGRAM FOR MALIGNANT NEOPLASM OF BREAST: ICD-10-CM

## 2023-01-31 PROCEDURE — 77067 MAMMO DIGITAL SCREENING BILAT WITH TOMO: ICD-10-PCS | Mod: 26,HCNC,, | Performed by: RADIOLOGY

## 2023-01-31 PROCEDURE — 77067 SCR MAMMO BI INCL CAD: CPT | Mod: 26,HCNC,, | Performed by: RADIOLOGY

## 2023-01-31 PROCEDURE — 77063 MAMMO DIGITAL SCREENING BILAT WITH TOMO: ICD-10-PCS | Mod: 26,HCNC,, | Performed by: RADIOLOGY

## 2023-01-31 PROCEDURE — 77067 SCR MAMMO BI INCL CAD: CPT | Mod: TC,HCNC,PO

## 2023-01-31 PROCEDURE — 77063 BREAST TOMOSYNTHESIS BI: CPT | Mod: 26,HCNC,, | Performed by: RADIOLOGY

## 2023-02-02 ENCOUNTER — OFFICE VISIT (OUTPATIENT)
Dept: FAMILY MEDICINE | Facility: CLINIC | Age: 78
End: 2023-02-02
Payer: MEDICARE

## 2023-02-02 VITALS
DIASTOLIC BLOOD PRESSURE: 90 MMHG | WEIGHT: 250.88 LBS | SYSTOLIC BLOOD PRESSURE: 178 MMHG | HEIGHT: 65 IN | BODY MASS INDEX: 41.8 KG/M2 | HEART RATE: 58 BPM

## 2023-02-02 DIAGNOSIS — I10 BENIGN ESSENTIAL HTN: Primary | ICD-10-CM

## 2023-02-02 DIAGNOSIS — R73.02 IMPAIRED GLUCOSE TOLERANCE: ICD-10-CM

## 2023-02-02 DIAGNOSIS — I25.10 CORONARY ARTERY DISEASE INVOLVING NATIVE CORONARY ARTERY OF NATIVE HEART WITHOUT ANGINA PECTORIS: ICD-10-CM

## 2023-02-02 DIAGNOSIS — Z01.89 ENCOUNTER FOR LABORATORY TEST: ICD-10-CM

## 2023-02-02 DIAGNOSIS — I70.0 AORTIC ATHEROSCLEROSIS: ICD-10-CM

## 2023-02-02 DIAGNOSIS — R60.0 BILATERAL LOWER EXTREMITY EDEMA: ICD-10-CM

## 2023-02-02 DIAGNOSIS — I47.10 PSVT (PAROXYSMAL SUPRAVENTRICULAR TACHYCARDIA): ICD-10-CM

## 2023-02-02 DIAGNOSIS — E78.49 OTHER HYPERLIPIDEMIA: ICD-10-CM

## 2023-02-02 DIAGNOSIS — D64.9 NORMOCYTIC ANEMIA: ICD-10-CM

## 2023-02-02 DIAGNOSIS — Z78.9 STATIN INTOLERANCE: ICD-10-CM

## 2023-02-02 DIAGNOSIS — I35.0 MODERATE AORTIC STENOSIS: ICD-10-CM

## 2023-02-02 DIAGNOSIS — E66.01 MORBID OBESITY: ICD-10-CM

## 2023-02-02 PROCEDURE — 1159F MED LIST DOCD IN RCRD: CPT | Mod: HCNC,CPTII,S$GLB, | Performed by: INTERNAL MEDICINE

## 2023-02-02 PROCEDURE — 3288F FALL RISK ASSESSMENT DOCD: CPT | Mod: HCNC,CPTII,S$GLB, | Performed by: INTERNAL MEDICINE

## 2023-02-02 PROCEDURE — 99214 OFFICE O/P EST MOD 30 MIN: CPT | Mod: HCNC,S$GLB,, | Performed by: INTERNAL MEDICINE

## 2023-02-02 PROCEDURE — 1101F PR PT FALLS ASSESS DOC 0-1 FALLS W/OUT INJ PAST YR: ICD-10-PCS | Mod: HCNC,CPTII,S$GLB, | Performed by: INTERNAL MEDICINE

## 2023-02-02 PROCEDURE — 99214 PR OFFICE/OUTPT VISIT, EST, LEVL IV, 30-39 MIN: ICD-10-PCS | Mod: HCNC,S$GLB,, | Performed by: INTERNAL MEDICINE

## 2023-02-02 PROCEDURE — 3288F PR FALLS RISK ASSESSMENT DOCUMENTED: ICD-10-PCS | Mod: HCNC,CPTII,S$GLB, | Performed by: INTERNAL MEDICINE

## 2023-02-02 PROCEDURE — 1160F RVW MEDS BY RX/DR IN RCRD: CPT | Mod: HCNC,CPTII,S$GLB, | Performed by: INTERNAL MEDICINE

## 2023-02-02 PROCEDURE — 1159F PR MEDICATION LIST DOCUMENTED IN MEDICAL RECORD: ICD-10-PCS | Mod: HCNC,CPTII,S$GLB, | Performed by: INTERNAL MEDICINE

## 2023-02-02 PROCEDURE — 3080F PR MOST RECENT DIASTOLIC BLOOD PRESSURE >= 90 MM HG: ICD-10-PCS | Mod: HCNC,CPTII,S$GLB, | Performed by: INTERNAL MEDICINE

## 2023-02-02 PROCEDURE — 99999 PR PBB SHADOW E&M-EST. PATIENT-LVL IV: CPT | Mod: PBBFAC,HCNC,, | Performed by: INTERNAL MEDICINE

## 2023-02-02 PROCEDURE — 3077F PR MOST RECENT SYSTOLIC BLOOD PRESSURE >= 140 MM HG: ICD-10-PCS | Mod: HCNC,CPTII,S$GLB, | Performed by: INTERNAL MEDICINE

## 2023-02-02 PROCEDURE — 3080F DIAST BP >= 90 MM HG: CPT | Mod: HCNC,CPTII,S$GLB, | Performed by: INTERNAL MEDICINE

## 2023-02-02 PROCEDURE — 99999 PR PBB SHADOW E&M-EST. PATIENT-LVL IV: ICD-10-PCS | Mod: PBBFAC,HCNC,, | Performed by: INTERNAL MEDICINE

## 2023-02-02 PROCEDURE — 1160F PR REVIEW ALL MEDS BY PRESCRIBER/CLIN PHARMACIST DOCUMENTED: ICD-10-PCS | Mod: HCNC,CPTII,S$GLB, | Performed by: INTERNAL MEDICINE

## 2023-02-02 PROCEDURE — 1101F PT FALLS ASSESS-DOCD LE1/YR: CPT | Mod: HCNC,CPTII,S$GLB, | Performed by: INTERNAL MEDICINE

## 2023-02-02 PROCEDURE — 3077F SYST BP >= 140 MM HG: CPT | Mod: HCNC,CPTII,S$GLB, | Performed by: INTERNAL MEDICINE

## 2023-02-02 RX ORDER — FUROSEMIDE 20 MG/1
20 TABLET ORAL DAILY
Qty: 90 TABLET | Refills: 1 | Status: SHIPPED | OUTPATIENT
Start: 2023-02-02 | End: 2023-02-18

## 2023-02-02 RX ORDER — CHOLESTYRAMINE 4 G/9G
4 POWDER, FOR SUSPENSION ORAL 2 TIMES DAILY
Qty: 180 PACKET | Refills: 3 | Status: SHIPPED | OUTPATIENT
Start: 2023-02-02 | End: 2024-02-02

## 2023-02-02 NOTE — PATIENT INSTRUCTIONS
Benign essential HTN: Maintain < 2 Gm Na a day diet, and monitor BP at home; keep a log. Needs to adhere to salt restriction a lot better; needs to change to decaff; presently 2 cups cof in morning. Chocolate at times. Will increase her lasix to 20 mg a day. Presently every other day. Weight reduction w exercise as tolerated. DASH diet. Needs new BP cuff at home. BP by me here 192/90; nervous; increasing lasix and to watch salt a lot closer. Change to decaff.   -     CBC Auto Differential; Future; Expected date: 02/02/2023  -     Comprehensive Metabolic Panel; Future; Expected date: 02/02/2023  -     Magnesium; Future; Expected date: 02/02/2023    Other hyperlipidemia: cont zetia 10 mg daily; resume Questran 4 gm 2x a day.   -     cholestyramine (QUESTRAN) 4 gram packet; Take 1 packet (4 g total) by mouth 2 (two) times daily. Mix in 8 oz of fluid 2x a day  Dispense: 180 packet; Refill: 3  -     Lipid Panel; Future; Expected date: 02/02/2023    Statin intolerance  -     cholestyramine (QUESTRAN) 4 gram packet; Take 1 packet (4 g total) by mouth 2 (two) times daily. Mix in 8 oz of fluid 2x a day  Dispense: 180 packet; Refill: 3    Coronary artery disease involving native coronary artery of native heart without angina pectoris; CAD has been stable without any chest pain, shortness of breath, or palpitations. Keep follow up with cardiologist as directed.   Sees Dr Rachel.   -     cholestyramine (QUESTRAN) 4 gram packet; Take 1 packet (4 g total) by mouth 2 (two) times daily. Mix in 8 oz of fluid 2x a day  Dispense: 180 packet; Refill: 3  -     Lipid Panel; Future; Expected date: 02/02/2023    Aortic atherosclerosis; on ASA 81 mg a day.   -     cholestyramine (QUESTRAN) 4 gram packet; Take 1 packet (4 g total) by mouth 2 (two) times daily. Mix in 8 oz of fluid 2x a day  Dispense: 180 packet; Refill: 3  -     Lipid Panel; Future; Expected date: 02/02/2023    PSVT (paroxysmal supraventricular tachycardia); no palp c/o's. On  metoprolol.     Bilateral lower extremity edema; no weight loss noted; increase lasix to 20 mg a day; cont HCTZ 4x a week. .Maintain less than 1.5-2 g sodium diet; elevate lower extremities at home; use compression stockings if possible.   -     Comprehensive Metabolic Panel; Future; Expected date: 02/02/2023  -     Lipid Panel; Future; Expected date: 02/02/2023  -     Magnesium; Future; Expected date: 02/02/2023  -     BNP; Future; Expected date: 02/02/2023    Impaired glucose tolerance: Exercise recommended with weight reduction and low carb diet; we'll follow hemoglobin A1c's with you periodically.HgA1C wnl. FBS nl as well.     Encounter for laboratory test; reviewed w pt.     Morbid obesity: Caloric restriction w regular exercise and weight reduction.     Normocytic anemia; cont Women's 50+ MVI one a day.   -     CBC Auto Differential; Future; Expected date: 02/02/2023

## 2023-02-02 NOTE — PROGRESS NOTES
Subjective:       Patient ID: Lanie Cavazos is a 77 y.o. female.    Chief Complaint: Results (F/u lab and mammo)    HPI: Here today for reassessment and go over lab work  Benign essential HTN: Maintain < 2 Gm Na a day diet, and monitor BP at home; keep a log. Needs to adhere to salt restriction a lot better; needs to change to decaff; presently 2 cups cof in morning. Chocolate at times. Will increase her lasix to 20 mg a day. Presently every other day. Weight reduction w exercise as tolerated. DASH diet. Needs new BP cuff at home. BP by me here 192/90; nervous; increasing lasix and to watch salt a lot closer. Change to decaff.  Exercise more and continue to work at weight reduction; manual here earlier 178/90 with pulse 58.  Walked here today.  -     CBC Auto Differential; Future; Expected date: 02/02/2023  -     Comprehensive Metabolic Panel; Future; Expected date: 02/02/2023  -     Magnesium; Future; Expected date: 02/02/2023    Other hyperlipidemia: cont zetia 10 mg daily; resume Questran 4 gm 2x a day.  Lipid profile:  Cholesterol 248 up from 214; triglyceride 97; HDL 49; .6; up from 140.  On Zetia daily; add Questran 1 packet of 4 g twice daily; she had stopped it earlier willing to resume it..  Watch diet closer and work at exercise regimen to lower her weight; LDL goal less than 70 with history of coronary artery disease and aortic atherosclerosis.        -     cholestyramine (QUESTRAN) 4 gram packet; Take 1 packet (4 g total) by mouth 2 (two) times daily. Mix in 8 oz of fluid 2x a day  Dispense: 180 packet; Refill: 3  -     Lipid Panel; Future; Expected date: 02/02/2023    Statin intolerance: Continue Zetia and add cholestyramine.  -     cholestyramine (QUESTRAN) 4 gram packet; Take 1 packet (4 g total) by mouth 2 (two) times daily. Mix in 8 oz of fluid 2x a day  Dispense: 180 packet; Refill: 3    Coronary artery disease involving native coronary artery of native heart without angina pectoris; CAD has  been stable without any chest pain, shortness of breath, or palpitations. Keep follow up with cardiologist as directed.   Sees Dr Rachel.   -     cholestyramine (QUESTRAN) 4 gram packet; Take 1 packet (4 g total) by mouth 2 (two) times daily. Mix in 8 oz of fluid 2x a day  Dispense: 180 packet; Refill: 3  -     Lipid Panel; Future; Expected date: 02/02/2023    Aortic atherosclerosis; on ASA 81 mg a day.   -     cholestyramine (QUESTRAN) 4 gram packet; Take 1 packet (4 g total) by mouth 2 (two) times daily. Mix in 8 oz of fluid 2x a day  Dispense: 180 packet; Refill: 3  -     Lipid Panel; Future; Expected date: 02/02/2023    PSVT (paroxysmal supraventricular tachycardia); no palp c/o's. On metoprolol.  Followed by Cardiology Dr. Rachel.    Bilateral lower extremity edema; no weight loss noted; increase lasix to 20 mg a day; cont HCTZ 4x a week. .Maintain less than 1.5-2 g sodium diet; elevate lower extremities at home; use compression stockings if possible.  She would decreased her Lasix to 20 mg every other day.  With no weight loss needs to increase back to 20 mg per day on her furosemide will help both her blood pressure and her lower extremity edema; needs to also use compression stockings during the day to help 3+ bilateral lower extremity edema  -     Comprehensive Metabolic Panel; Future; Expected date: 02/02/2023  -     Lipid Panel; Future; Expected date: 02/02/2023  -     Magnesium; Future; Expected date: 02/02/2023  -     BNP; Future; Expected date: 02/02/2023    Impaired glucose tolerance: Exercise recommended with weight reduction and low carb diet; we'll follow hemoglobin A1c's with you periodically.HgA1C wnl. FBS nl as well.     Encounter for laboratory test; reviewed w pt.; lab subsequently ordered for follow-up as well.    Morbid obesity: Caloric restriction w regular exercise and weight reduction.     Normocytic anemia; cont Women's 50+ MVI one a day.  Hemoglobin slightly reduced to 11.8; previously  "was 12.2.  -     CBC Auto Differential; Future; Expected date: 02/02/2023    Moderate aortic stenosis:  No complaints of chest pain shortness some breath or heart palpitations.  No dizziness complaints or lightheadedness.  Followed by Cardiology Dr. Rachel    Total time 8:25 a.m. through 9:15 a.m..  Greater than 50% of time spent in discussion, counseling, and review.  Labs reviewed and discussed with patient at length in ordered for follow-up.  Medications reviewed and addressed.  Various different topics discussed including plan of care.      Review of Systems   Constitutional:  Negative for appetite change and fever.   HENT:  Negative for congestion, postnasal drip, rhinorrhea and sinus pressure.    Eyes:  Negative for discharge and itching.   Respiratory:  Negative for cough, chest tightness, shortness of breath and wheezing.    Cardiovascular:  Negative for chest pain, palpitations and leg swelling.   Gastrointestinal:  Negative for abdominal distention, abdominal pain, blood in stool, constipation, diarrhea, nausea and vomiting.   Endocrine: Negative for polydipsia, polyphagia and polyuria.   Genitourinary:  Negative for dysuria and hematuria.   Musculoskeletal:  Negative for arthralgias and myalgias.   Skin:  Negative for rash.   Allergic/Immunologic: Negative for environmental allergies and food allergies.   Neurological:  Negative for tremors, seizures and syncope.   Hematological:  Negative for adenopathy. Does not bruise/bleed easily.   Psychiatric/Behavioral:  Negative for dysphoric mood. The patient is not nervous/anxious.         Objective:        Vitals:    02/02/23 0808   BP: (!) 178/90   BP Location: Right arm   Pulse: (!) 58   Weight: 113.8 kg (250 lb 14.1 oz)   Height: 5' 5" (1.651 m)       BMI Readings from Last 3 Encounters:   02/02/23 41.75 kg/m²   09/29/22 41.79 kg/m²   09/13/22 42.81 kg/m²        Wt Readings from Last 3 Encounters:   02/02/23 0808 113.8 kg (250 lb 14.1 oz)   09/29/22 1020 " 113.9 kg (251 lb 1.7 oz)   09/13/22 0826 116.7 kg (257 lb 4.4 oz)        BP Readings from Last 3 Encounters:   02/02/23 (!) 178/90   09/29/22 134/78   09/13/22 136/80        There are no preventive care reminders to display for this patient.     Health Maintenance Due   Topic Date Due    COVID-19 Vaccine (1) Never done    Shingles Vaccine (1 of 2) Never done         Past Medical History:   Diagnosis Date    Carotid artery stenosis     Coronary artery disease     Hyperlipidemia     Hypertension     Obesity     Paroxysmal supraventricular tachycardia        Past Surgical History:   Procedure Laterality Date    APPENDECTOMY      BREAST BIOPSY Left     negative  needle asp  by Dr Olmedo    CARPAL TUNNEL RELEASE      CATARACT EXTRACTION BILATERAL W/ ANTERIOR VITRECTOMY      COLONOSCOPY N/A 12/18/2015    Procedure: COLONOSCOPY;  Surgeon: Timothy Syed Jr., MD;  Location: [facility];  Service: Endoscopy;  Laterality: N/A;repeatb in 3 yrs. Benign polyps w possible adenomatous change in transverse colon polyp..    EYE SURGERY      HAND SURGERY  01/01/2001    HYSTERECTOMY  1979    without BSO.    toenail extraction Right 2016    4th toe.       Social History     Tobacco Use    Smoking status: Never    Smokeless tobacco: Never   Substance Use Topics    Alcohol use: No     Comment: rarely.    Drug use: No       Family History   Problem Relation Age of Onset    Cancer Mother     Cancer Father     COPD Son     Ovarian cancer Neg Hx     Breast cancer Neg Hx        Review of patient's allergies indicates:   Allergen Reactions    Crestor [rosuvastatin]      Muscle ache    Lipitor [atorvastatin]      Muscle ache    Pravachol [pravastatin]      Muscle ache    Sulfa (sulfonamide antibiotics) Swelling    Welchol [colesevelam]      Muscle ache    Zocor [simvastatin]      Muscle ache    Influenza virus vaccines Swelling and Rash     2014 flu vaccination       Current Outpatient Medications on File Prior to Visit   Medication Sig Dispense  Refill    multivitamin (THERAGRAN) per tablet Take 1 tablet by mouth once daily.      acetaminophen (TYLENOL) 500 MG tablet Take 500 mg by mouth 3 (three) times daily as needed for Pain.      amLODIPine (NORVASC) 2.5 MG tablet TAKE 1 TABLET BY MOUTH EVERY DAY 90 tablet 2    ascorbic acid, vitamin C, (VITAMIN C) 500 MG tablet Take 500 mg by mouth once daily.      aspirin (ECOTRIN) 81 MG EC tablet Take 81 mg by mouth once daily. Three times a week Monday, Wednesday, Friday.      calcium-vitamin D 500-125 mg-unit tablet Take 1 tablet by mouth once daily.      ezetimibe (ZETIA) 10 mg tablet Take 1 tablet (10 mg total) by mouth every evening. Generic please. 90 tablet 3    GLUCOSAMINE HCL/CHONDR VILLASENOR A NA (OSTEO BI-FLEX ORAL) Take by mouth once daily.      hydroCHLOROthiazide (HYDRODIURIL) 12.5 MG Tab TAKE 1 TABLET BY MOUTH EVERY TUESDAY, THURSDAY, SATURDAY, AND SUNDAY. 48 tablet 2    methylcellulose, laxative, 500 mg Tab Take by mouth once daily.      mv-min-FA-D3-om3-dha-epa-fish 200 mcg-500 unit-200 mg Cap Take 1 tablet by mouth once daily.       nystatin (MYCOSTATIN) cream Apply topically 2 (two) times daily. 30 g 2    potassium chloride (MICRO-K) 10 MEQ CpSR Take 1 capsule (10 mEq total) by mouth once daily. 90 capsule 1    triamcinolone (NASACORT) 55 mcg nasal inhaler 2 sprays by Nasal route once daily.      UBIDECARENONE (COENZYME Q10) 100 mg Tab Take 100 mg by mouth once daily.      vitamin E 400 UNIT capsule Take 400 Units by mouth once daily.      VITAMIN E/FLAXSEED OIL (FLAXSEED OIL-VITAMIN E) 1,000-1 mg-unit Cap Take 1,000 mg by mouth once daily.      [DISCONTINUED] metoprolol succinate (TOPROL-XL) 25 MG 24 hr tablet Take 1 tablet (25 mg total) by mouth once daily. 30 tablet 11     No current facility-administered medications on file prior to visit.       Physical Exam  Vitals reviewed.   Constitutional:       Appearance: She is well-developed.   HENT:      Head: Normocephalic and atraumatic.   Neck:       Thyroid: No thyromegaly.   Cardiovascular:      Rate and Rhythm: Normal rate and regular rhythm.      Heart sounds: Normal heart sounds. No murmur heard.    No gallop.      Comments: HOMER 3/6 aortic Hx AS.  Pulmonary:      Effort: Pulmonary effort is normal. No respiratory distress.      Breath sounds: Normal breath sounds. No wheezing or rales.   Abdominal:      General: Bowel sounds are normal. There is no distension.      Palpations: Abdomen is soft.      Tenderness: There is no abdominal tenderness. There is no guarding or rebound.   Musculoskeletal:         General: Normal range of motion.      Cervical back: Normal range of motion and neck supple.      Right lower leg: Edema present.      Left lower leg: Edema present.      Comments: Obese LE's w 3+ edema and spider veins. Not using compression stockings   Lymphadenopathy:      Cervical: No cervical adenopathy.   Skin:     Findings: No rash.   Neurological:      Mental Status: She is alert and oriented to person, place, and time.      Comments: Moves all 4 extremities fine.   Psychiatric:         Behavior: Behavior normal.         Thought Content: Thought content normal.       Assessment:       1. Benign essential HTN    2. Other hyperlipidemia    3. Statin intolerance    4. Coronary artery disease involving native coronary artery of native heart without angina pectoris    5. Aortic atherosclerosis    6. PSVT (paroxysmal supraventricular tachycardia)    7. Bilateral lower extremity edema    8. Impaired glucose tolerance    9. Encounter for laboratory test    10. Morbid obesity    11. Normocytic anemia    12. Moderate aortic stenosis        Plan:       Benign essential HTN: Maintain < 2 Gm Na a day diet, and monitor BP at home; keep a log. Needs to adhere to salt restriction a lot better; needs to change to decaff; presently 2 cups cof in morning. Chocolate at times. Will increase her lasix to 20 mg a day. Presently every other day. Weight reduction w exercise  as tolerated. DASH diet. Needs new BP cuff at home. BP by me here 192/90; nervous; increasing lasix and to watch salt a lot closer. Change to decaff.  Exercise more and continue to work at weight reduction; manual here earlier 178/90 with pulse 58.  Walked here today.  -     CBC Auto Differential; Future; Expected date: 02/02/2023  -     Comprehensive Metabolic Panel; Future; Expected date: 02/02/2023  -     Magnesium; Future; Expected date: 02/02/2023    Other hyperlipidemia: cont zetia 10 mg daily; resume Questran 4 gm 2x a day.  Lipid profile:  Cholesterol 248 up from 214; triglyceride 97; HDL 49; .6; up from 140.  On Zetia daily; add Questran 1 packet of 4 g twice daily; she had stopped it earlier willing to resume it..  Watch diet closer and work at exercise regimen to lower her weight; LDL goal less than 70 with history of coronary artery disease and aortic atherosclerosis.        -     cholestyramine (QUESTRAN) 4 gram packet; Take 1 packet (4 g total) by mouth 2 (two) times daily. Mix in 8 oz of fluid 2x a day  Dispense: 180 packet; Refill: 3  -     Lipid Panel; Future; Expected date: 02/02/2023    Statin intolerance: Continue Zetia and add cholestyramine.  -     cholestyramine (QUESTRAN) 4 gram packet; Take 1 packet (4 g total) by mouth 2 (two) times daily. Mix in 8 oz of fluid 2x a day  Dispense: 180 packet; Refill: 3    Coronary artery disease involving native coronary artery of native heart without angina pectoris; CAD has been stable without any chest pain, shortness of breath, or palpitations. Keep follow up with cardiologist as directed.   Sees Dr Rachel.   -     cholestyramine (QUESTRAN) 4 gram packet; Take 1 packet (4 g total) by mouth 2 (two) times daily. Mix in 8 oz of fluid 2x a day  Dispense: 180 packet; Refill: 3  -     Lipid Panel; Future; Expected date: 02/02/2023    Aortic atherosclerosis; on ASA 81 mg a day.   -     cholestyramine (QUESTRAN) 4 gram packet; Take 1 packet (4 g total) by  mouth 2 (two) times daily. Mix in 8 oz of fluid 2x a day  Dispense: 180 packet; Refill: 3  -     Lipid Panel; Future; Expected date: 02/02/2023    PSVT (paroxysmal supraventricular tachycardia); no palp c/o's. On metoprolol.  Followed by Cardiology Dr. Rachel.    Bilateral lower extremity edema; no weight loss noted; increase lasix to 20 mg a day; cont HCTZ 4x a week. .Maintain less than 1.5-2 g sodium diet; elevate lower extremities at home; use compression stockings if possible.  She would decreased her Lasix to 20 mg every other day.  With no weight loss needs to increase back to 20 mg per day on her furosemide will help both her blood pressure and her lower extremity edema; needs to also use compression stockings during the day to help 3+ bilateral lower extremity edema  -     Comprehensive Metabolic Panel; Future; Expected date: 02/02/2023  -     Lipid Panel; Future; Expected date: 02/02/2023  -     Magnesium; Future; Expected date: 02/02/2023  -     BNP; Future; Expected date: 02/02/2023    Impaired glucose tolerance: Exercise recommended with weight reduction and low carb diet; we'll follow hemoglobin A1c's with you periodically.HgA1C wnl. FBS nl as well.     Encounter for laboratory test; reviewed w pt.; lab subsequently ordered for follow-up as well.    Morbid obesity: Caloric restriction w regular exercise and weight reduction.     Normocytic anemia; cont Women's 50+ MVI one a day.  Hemoglobin slightly reduced to 11.8; previously was 12.2.  -     CBC Auto Differential; Future; Expected date: 02/02/2023    Moderate aortic stenosis:  No complaints of chest pain shortness some breath or heart palpitations.  No dizziness complaints or lightheadedness.  Followed by Cardiology Dr. Rachel

## 2023-02-07 DIAGNOSIS — Z00.00 ENCOUNTER FOR MEDICARE ANNUAL WELLNESS EXAM: ICD-10-CM

## 2023-02-09 DIAGNOSIS — Z00.00 ENCOUNTER FOR MEDICARE ANNUAL WELLNESS EXAM: ICD-10-CM

## 2023-02-17 DIAGNOSIS — I10 BENIGN ESSENTIAL HTN: ICD-10-CM

## 2023-02-17 DIAGNOSIS — R00.1 BRADYCARDIA: ICD-10-CM

## 2023-02-17 DIAGNOSIS — I25.10 CORONARY ARTERY DISEASE INVOLVING NATIVE CORONARY ARTERY OF NATIVE HEART WITHOUT ANGINA PECTORIS: ICD-10-CM

## 2023-02-17 NOTE — TELEPHONE ENCOUNTER
No new care gaps identified.  [facility] Embedded Care Gaps. Reference number: [id number]. 2/17/2023   12:09:23 AM CST

## 2023-02-17 NOTE — TELEPHONE ENCOUNTER
Refill Routing Note   Medication(s) are not appropriate for processing by [facility] for the following reason(s):       Required vitals abnormal    [facility] action(s):  Defer         Appointments  past 12m or future 3m with PCP    Date Provider   Last Visit   2/2/2023 Brett Garcia MD   Next Visit   4/3/2023 Brett Garcia MD   ED visits in past 90 days: 0        Note composed:7:33 AM 02/17/2023

## 2023-02-18 PROBLEM — D64.9 NORMOCYTIC ANEMIA: Status: ACTIVE | Noted: 2023-02-18

## 2023-02-18 RX ORDER — METOPROLOL SUCCINATE 25 MG/1
TABLET, EXTENDED RELEASE ORAL
Qty: 90 TABLET | Refills: 3 | Status: SHIPPED | OUTPATIENT
Start: 2023-02-18

## 2023-02-18 RX ORDER — FUROSEMIDE 20 MG/1
20 TABLET ORAL 2 TIMES DAILY
Qty: 90 TABLET | Refills: 1 | Status: SHIPPED | OUTPATIENT
Start: 2023-02-18 | End: 2023-05-16

## 2023-03-27 ENCOUNTER — LAB VISIT (OUTPATIENT)
Dept: LAB | Facility: HOSPITAL | Age: 78
End: 2023-03-27
Attending: INTERNAL MEDICINE
Payer: MEDICARE

## 2023-03-27 DIAGNOSIS — I10 BENIGN ESSENTIAL HTN: ICD-10-CM

## 2023-03-27 DIAGNOSIS — E78.49 OTHER HYPERLIPIDEMIA: ICD-10-CM

## 2023-03-27 DIAGNOSIS — R60.0 BILATERAL LOWER EXTREMITY EDEMA: ICD-10-CM

## 2023-03-27 DIAGNOSIS — I70.0 AORTIC ATHEROSCLEROSIS: ICD-10-CM

## 2023-03-27 DIAGNOSIS — D64.9 NORMOCYTIC ANEMIA: ICD-10-CM

## 2023-03-27 DIAGNOSIS — I25.10 CORONARY ARTERY DISEASE INVOLVING NATIVE CORONARY ARTERY OF NATIVE HEART WITHOUT ANGINA PECTORIS: ICD-10-CM

## 2023-03-27 LAB
ALBUMIN SERPL BCP-MCNC: 3.6 G/DL (ref 3.5–5.2)
ALP SERPL-CCNC: 87 U/L (ref 55–135)
ALT SERPL W/O P-5'-P-CCNC: 7 U/L (ref 10–44)
ANION GAP SERPL CALC-SCNC: 7 MMOL/L (ref 8–16)
AST SERPL-CCNC: 14 U/L (ref 10–40)
BASOPHILS # BLD AUTO: 0.05 K/UL (ref 0–0.2)
BASOPHILS NFR BLD: 0.6 % (ref 0–1.9)
BILIRUB SERPL-MCNC: 0.5 MG/DL (ref 0.1–1)
BNP SERPL-MCNC: 238 PG/ML (ref 0–99)
BUN SERPL-MCNC: 18 MG/DL (ref 8–23)
CALCIUM SERPL-MCNC: 9.7 MG/DL (ref 8.7–10.5)
CHLORIDE SERPL-SCNC: 108 MMOL/L (ref 95–110)
CHOLEST SERPL-MCNC: 216 MG/DL (ref 120–199)
CHOLEST/HDLC SERPL: 4.7 {RATIO} (ref 2–5)
CO2 SERPL-SCNC: 26 MMOL/L (ref 23–29)
CREAT SERPL-MCNC: 0.7 MG/DL (ref 0.5–1.4)
DIFFERENTIAL METHOD: ABNORMAL
EOSINOPHIL # BLD AUTO: 0.2 K/UL (ref 0–0.5)
EOSINOPHIL NFR BLD: 2.5 % (ref 0–8)
ERYTHROCYTE [DISTWIDTH] IN BLOOD BY AUTOMATED COUNT: 13.9 % (ref 11.5–14.5)
EST. GFR  (NO RACE VARIABLE): >60 ML/MIN/1.73 M^2
GLUCOSE SERPL-MCNC: 93 MG/DL (ref 70–110)
HCT VFR BLD AUTO: 35.3 % (ref 37–48.5)
HDLC SERPL-MCNC: 46 MG/DL (ref 40–75)
HDLC SERPL: 21.3 % (ref 20–50)
HGB BLD-MCNC: 11.3 G/DL (ref 12–16)
IMM GRANULOCYTES # BLD AUTO: 0.02 K/UL (ref 0–0.04)
IMM GRANULOCYTES NFR BLD AUTO: 0.3 % (ref 0–0.5)
LDLC SERPL CALC-MCNC: 155.6 MG/DL (ref 63–159)
LYMPHOCYTES # BLD AUTO: 1.5 K/UL (ref 1–4.8)
LYMPHOCYTES NFR BLD: 19.4 % (ref 18–48)
MAGNESIUM SERPL-MCNC: 1.8 MG/DL (ref 1.6–2.6)
MCH RBC QN AUTO: 27.6 PG (ref 27–31)
MCHC RBC AUTO-ENTMCNC: 32 G/DL (ref 32–36)
MCV RBC AUTO: 86 FL (ref 82–98)
MONOCYTES # BLD AUTO: 0.6 K/UL (ref 0.3–1)
MONOCYTES NFR BLD: 7.8 % (ref 4–15)
NEUTROPHILS # BLD AUTO: 5.4 K/UL (ref 1.8–7.7)
NEUTROPHILS NFR BLD: 69.4 % (ref 38–73)
NONHDLC SERPL-MCNC: 170 MG/DL
NRBC BLD-RTO: 0 /100 WBC
PLATELET # BLD AUTO: 226 K/UL (ref 150–450)
PMV BLD AUTO: 11 FL (ref 9.2–12.9)
POTASSIUM SERPL-SCNC: 4.3 MMOL/L (ref 3.5–5.1)
PROT SERPL-MCNC: 6.9 G/DL (ref 6–8.4)
RBC # BLD AUTO: 4.09 M/UL (ref 4–5.4)
SODIUM SERPL-SCNC: 141 MMOL/L (ref 136–145)
TRIGL SERPL-MCNC: 72 MG/DL (ref 30–150)
WBC # BLD AUTO: 7.74 K/UL (ref 3.9–12.7)

## 2023-03-27 PROCEDURE — 80053 COMPREHEN METABOLIC PANEL: CPT | Mod: HCNC | Performed by: INTERNAL MEDICINE

## 2023-03-27 PROCEDURE — 83880 ASSAY OF NATRIURETIC PEPTIDE: CPT | Mod: HCNC | Performed by: INTERNAL MEDICINE

## 2023-03-27 PROCEDURE — 80061 LIPID PANEL: CPT | Mod: HCNC | Performed by: INTERNAL MEDICINE

## 2023-03-27 PROCEDURE — 83735 ASSAY OF MAGNESIUM: CPT | Mod: HCNC | Performed by: INTERNAL MEDICINE

## 2023-03-27 PROCEDURE — 85025 COMPLETE CBC W/AUTO DIFF WBC: CPT | Mod: HCNC | Performed by: INTERNAL MEDICINE

## 2023-03-27 PROCEDURE — 36415 COLL VENOUS BLD VENIPUNCTURE: CPT | Mod: HCNC,PO | Performed by: INTERNAL MEDICINE

## 2023-04-03 ENCOUNTER — OFFICE VISIT (OUTPATIENT)
Dept: FAMILY MEDICINE | Facility: CLINIC | Age: 78
End: 2023-04-03
Payer: MEDICARE

## 2023-04-03 VITALS
BODY MASS INDEX: 41.29 KG/M2 | HEIGHT: 65 IN | HEART RATE: 60 BPM | WEIGHT: 247.81 LBS | DIASTOLIC BLOOD PRESSURE: 80 MMHG | OXYGEN SATURATION: 96 % | SYSTOLIC BLOOD PRESSURE: 140 MMHG

## 2023-04-03 DIAGNOSIS — I70.0 AORTIC ATHEROSCLEROSIS: ICD-10-CM

## 2023-04-03 DIAGNOSIS — R73.02 IMPAIRED GLUCOSE TOLERANCE: ICD-10-CM

## 2023-04-03 DIAGNOSIS — I10 BENIGN ESSENTIAL HTN: Primary | ICD-10-CM

## 2023-04-03 DIAGNOSIS — D64.9 NORMOCYTIC ANEMIA: ICD-10-CM

## 2023-04-03 DIAGNOSIS — E66.01 MORBID OBESITY: ICD-10-CM

## 2023-04-03 DIAGNOSIS — E78.5 DYSLIPIDEMIA: ICD-10-CM

## 2023-04-03 DIAGNOSIS — I35.0 MODERATE AORTIC STENOSIS: ICD-10-CM

## 2023-04-03 DIAGNOSIS — I87.2 EDEMA OF BOTH LOWER EXTREMITIES DUE TO PERIPHERAL VENOUS INSUFFICIENCY: ICD-10-CM

## 2023-04-03 DIAGNOSIS — Z01.89 ENCOUNTER FOR LABORATORY TEST: ICD-10-CM

## 2023-04-03 DIAGNOSIS — E78.49 OTHER HYPERLIPIDEMIA: ICD-10-CM

## 2023-04-03 DIAGNOSIS — R60.0 BILATERAL LOWER EXTREMITY EDEMA: ICD-10-CM

## 2023-04-03 DIAGNOSIS — I25.10 CORONARY ARTERY DISEASE INVOLVING NATIVE CORONARY ARTERY OF NATIVE HEART WITHOUT ANGINA PECTORIS: ICD-10-CM

## 2023-04-03 DIAGNOSIS — Z78.9 STATIN INTOLERANCE: ICD-10-CM

## 2023-04-03 PROCEDURE — 1160F RVW MEDS BY RX/DR IN RCRD: CPT | Mod: HCNC,CPTII,S$GLB, | Performed by: INTERNAL MEDICINE

## 2023-04-03 PROCEDURE — 3079F DIAST BP 80-89 MM HG: CPT | Mod: HCNC,CPTII,S$GLB, | Performed by: INTERNAL MEDICINE

## 2023-04-03 PROCEDURE — 3288F PR FALLS RISK ASSESSMENT DOCUMENTED: ICD-10-PCS | Mod: HCNC,CPTII,S$GLB, | Performed by: INTERNAL MEDICINE

## 2023-04-03 PROCEDURE — 1159F PR MEDICATION LIST DOCUMENTED IN MEDICAL RECORD: ICD-10-PCS | Mod: HCNC,CPTII,S$GLB, | Performed by: INTERNAL MEDICINE

## 2023-04-03 PROCEDURE — 99999 PR PBB SHADOW E&M-EST. PATIENT-LVL V: ICD-10-PCS | Mod: PBBFAC,HCNC,, | Performed by: INTERNAL MEDICINE

## 2023-04-03 PROCEDURE — 3288F FALL RISK ASSESSMENT DOCD: CPT | Mod: HCNC,CPTII,S$GLB, | Performed by: INTERNAL MEDICINE

## 2023-04-03 PROCEDURE — 99214 PR OFFICE/OUTPT VISIT, EST, LEVL IV, 30-39 MIN: ICD-10-PCS | Mod: HCNC,S$GLB,, | Performed by: INTERNAL MEDICINE

## 2023-04-03 PROCEDURE — 1160F PR REVIEW ALL MEDS BY PRESCRIBER/CLIN PHARMACIST DOCUMENTED: ICD-10-PCS | Mod: HCNC,CPTII,S$GLB, | Performed by: INTERNAL MEDICINE

## 2023-04-03 PROCEDURE — 1159F MED LIST DOCD IN RCRD: CPT | Mod: HCNC,CPTII,S$GLB, | Performed by: INTERNAL MEDICINE

## 2023-04-03 PROCEDURE — 3077F PR MOST RECENT SYSTOLIC BLOOD PRESSURE >= 140 MM HG: ICD-10-PCS | Mod: HCNC,CPTII,S$GLB, | Performed by: INTERNAL MEDICINE

## 2023-04-03 PROCEDURE — 1101F PT FALLS ASSESS-DOCD LE1/YR: CPT | Mod: HCNC,CPTII,S$GLB, | Performed by: INTERNAL MEDICINE

## 2023-04-03 PROCEDURE — 99999 PR PBB SHADOW E&M-EST. PATIENT-LVL V: CPT | Mod: PBBFAC,HCNC,, | Performed by: INTERNAL MEDICINE

## 2023-04-03 PROCEDURE — 1101F PR PT FALLS ASSESS DOC 0-1 FALLS W/OUT INJ PAST YR: ICD-10-PCS | Mod: HCNC,CPTII,S$GLB, | Performed by: INTERNAL MEDICINE

## 2023-04-03 PROCEDURE — 99214 OFFICE O/P EST MOD 30 MIN: CPT | Mod: HCNC,S$GLB,, | Performed by: INTERNAL MEDICINE

## 2023-04-03 PROCEDURE — 1126F PR PAIN SEVERITY QUANTIFIED, NO PAIN PRESENT: ICD-10-PCS | Mod: HCNC,CPTII,S$GLB, | Performed by: INTERNAL MEDICINE

## 2023-04-03 PROCEDURE — 3077F SYST BP >= 140 MM HG: CPT | Mod: HCNC,CPTII,S$GLB, | Performed by: INTERNAL MEDICINE

## 2023-04-03 PROCEDURE — 1126F AMNT PAIN NOTED NONE PRSNT: CPT | Mod: HCNC,CPTII,S$GLB, | Performed by: INTERNAL MEDICINE

## 2023-04-03 PROCEDURE — 3079F PR MOST RECENT DIASTOLIC BLOOD PRESSURE 80-89 MM HG: ICD-10-PCS | Mod: HCNC,CPTII,S$GLB, | Performed by: INTERNAL MEDICINE

## 2023-04-03 RX ORDER — BEMPEDOIC ACID 180 MG/1
180 TABLET, FILM COATED ORAL DAILY
Qty: 30 TABLET | Refills: 2 | Status: SHIPPED | OUTPATIENT
Start: 2023-04-03

## 2023-04-03 RX ORDER — FERROUS GLUCONATE 324(38)MG
324 TABLET ORAL
Qty: 30 TABLET | Refills: 2 | Status: SHIPPED | OUTPATIENT
Start: 2023-04-03 | End: 2023-06-29

## 2023-04-03 RX ORDER — LOSARTAN POTASSIUM 25 MG/1
25 TABLET ORAL DAILY
Qty: 90 TABLET | Refills: 1 | Status: SHIPPED | OUTPATIENT
Start: 2023-04-03 | End: 2023-06-01

## 2023-04-03 NOTE — PATIENT INSTRUCTIONS
Benign essential HTN: Maintain < 2 Gm Na a day diet, and monitor BP at home; keep a log. Add losartan 25 mg a day to her meds.   -     losartan (COZAAR) 25 MG tablet; Take 1 tablet (25 mg total) by mouth once daily. Generic  Dispense: 90 tablet; Refill: 1    Other hyperlipidemia: Maintain low fat high fiber diet, exercise regularly. Weight reduction where indicated.  Cont zetia and Questran daily. Add metamucil gummies 2-3 a day. Add Nexletol 180 mg a day.   -     bempedoic acid (NEXLETOL) 180 mg Tab; Take 1 tablet (180 mg total) by mouth Daily.  Dispense: 30 tablet; Refill: 2    Dyslipidemia  -     bempedoic acid (NEXLETOL) 180 mg Tab; Take 1 tablet (180 mg total) by mouth Daily.  Dispense: 30 tablet; Refill: 2    Statin intolerance; has tried several; lead to myalgias.   -     bempedoic acid (NEXLETOL) 180 mg Tab; Take 1 tablet (180 mg total) by mouth Daily.  Dispense: 30 tablet; Refill: 2    Coronary artery disease involving native coronary artery of native heart without angina pectoris: CAD has been stable without any chest pain, shortness of breath, or palpitations. Keep follow up with cardiologist as directed.   Sees Card Dr Rachel.   -     losartan (COZAAR) 25 MG tablet; Take 1 tablet (25 mg total) by mouth once daily. Generic  Dispense: 90 tablet; Refill: 1  -     bempedoic acid (NEXLETOL) 180 mg Tab; Take 1 tablet (180 mg total) by mouth Daily.  Dispense: 30 tablet; Refill: 2    Impaired glucose tolerance; Exercise recommended with weight reduction and low carb diet; we'll follow hemoglobin A1c's with you periodically.  -     losartan (COZAAR) 25 MG tablet; Take 1 tablet (25 mg total) by mouth once daily. Generic  Dispense: 90 tablet; Refill: 1    Bilateral lower extremity edema: Maintain less than 2 g sodium diet; elevate lower extremities at home; use compression stockings if possible. On lasix 20 mg a day and HCTZ 4x a week. -     losartan (COZAAR) 25 MG tablet; Take 1 tablet (25 mg total) by mouth once  daily. Generic  Dispense: 90 tablet; Refill: 1    Edema of both lower extremities due to peripheral venous insufficiency: as above    Morbid obesity; Caloric restriction w regular exercise and weight reduction.     Normocytic anemia; Women's 50+ MVI one a day to cont. Eats littyle red meat. Add ferrous gluconate 324 mg a day. Iron levels w f/u.     Moderate aortic stenosis: has been stable w no dizziness or light headedness. No presyncope.

## 2023-04-03 NOTE — PROGRESS NOTES
Subjective:       Patient ID: Lanie Cavazos is a 77 y.o. female.    Chief Complaint: Follow-up (Blood test results)  HPI: Here today for reassessment and go over lab results.  Follow-up  Pertinent negatives include no abdominal pain, arthralgias, chest pain, congestion, coughing, fever, myalgias, nausea, rash or vomiting.   Benign essential HTN: Maintain < 2 Gm Na a day diet, and monitor BP at home; keep a log. Add losartan 25 mg a day to her meds.  Manually blood pressure here today 140/80; at home average blood pressure is 140/75 but needs to wait 4-5 minutes after being seated before running her blood pressure cuff in the morning.  Continue amlodipine 2.5 mg per day; Lasix 20 mg per day; metoprolol 25 mg per day; and HCTZ 12.5 mg 4 times a week  -     losartan (COZAAR) 25 MG tablet; Take 1 tablet (25 mg total) by mouth once daily. Generic  Dispense: 90 tablet; Refill: 1    Other hyperlipidemia: Maintain low fat high fiber diet, exercise regularly. Weight reduction where indicated.  Cont zetia and Questran daily. Add metamucil gummies 2-3 a day. Add Nexletol 180 mg a day.  Lipid profile:  Cholesterol 216/triglyceride 72/HDL 46/.6 down from 179.6; continue Zetia and Questran.  Lipid profile with CMP for follow-up labs  -     bempedoic acid (NEXLETOL) 180 mg Tab; Take 1 tablet (180 mg total) by mouth Daily.  Dispense: 30 tablet; Refill: 2    Dyslipidemia:  Increase aerobic activity in addition to above  -     bempedoic acid (NEXLETOL) 180 mg Tab; Take 1 tablet (180 mg total) by mouth Daily.  Dispense: 30 tablet; Refill: 2    Statin intolerance; has tried several; lead to myalgias.   -     bempedoic acid (NEXLETOL) 180 mg Tab; Take 1 tablet (180 mg total) by mouth Daily.  Dispense: 30 tablet; Refill: 2    Coronary artery disease involving native coronary artery of native heart without angina pectoris: CAD has been stable without any chest pain, shortness of breath, or palpitations. Keep follow up with  cardiologist as directed.   Sees Blaze Rachel.  On Zetia and Questran.  On metoprolol succinate 25 mg daily  -     losartan (COZAAR) 25 MG tablet; Take 1 tablet (25 mg total) by mouth once daily. Generic  Dispense: 90 tablet; Refill: 1  -     bempedoic acid (NEXLETOL) 180 mg Tab; Take 1 tablet (180 mg total) by mouth Daily.  Dispense: 30 tablet; Refill: 2    Impaired glucose tolerance; Exercise recommended with weight reduction and low carb diet; we'll follow hemoglobin A1c's with you periodically.  -     losartan (COZAAR) 25 MG tablet; Take 1 tablet (25 mg total) by mouth once daily. Generic  Dispense: 90 tablet; Refill: 1    Bilateral lower extremity edema: Maintain less than 2 g sodium diet; elevate lower extremities at home; use compression stockings if possible. On lasix 20 mg a day and HCTZ 4x a week.          -     losartan (COZAAR) 25 MG tablet; Take 1 tablet (25 mg total) by mouth once daily. Generic  Dispense: 90 tablet; Refill: 1    Edema of both lower extremities due to peripheral venous insufficiency: as above    Morbid obesity; Caloric restriction w regular exercise and weight reduction.  Smaller portions will also help.  BMI 41.24    Normocytic anemia; Women's 50+ MVI one a day to cont. Eats little red meat. Add ferrous gluconate 324 mg a day. Iron levels w f/u.     Moderate aortic stenosis: has been stable w no dizziness or light headedness. No presyncope. Followed by dr Virginie garza.     Encounter for lab test: Labs reviewed and discussed with patient at length in ordered for follow-up        The 10-year ASCVD risk score (Chente DK, et al., 2019) is: 32.1%    Values used to calculate the score:      Age: 77 years      Sex: Female      Is Non- : No      Diabetic: No      Tobacco smoker: No      Systolic Blood Pressure: 140 mmHg      Is BP treated: Yes      HDL Cholesterol: 46 mg/dL      Total Cholesterol: 216 mg/dL      Review of Systems   Constitutional:  Negative for appetite  "change and fever.   HENT:  Negative for congestion, postnasal drip, rhinorrhea and sinus pressure.    Eyes:  Negative for discharge and itching.   Respiratory:  Negative for cough, chest tightness, shortness of breath and wheezing.    Cardiovascular:  Positive for leg swelling. Negative for chest pain and palpitations.   Gastrointestinal:  Negative for abdominal distention, abdominal pain, blood in stool, constipation, diarrhea, nausea and vomiting.   Endocrine: Negative for polydipsia, polyphagia and polyuria.   Genitourinary:  Negative for dysuria and hematuria.   Musculoskeletal:  Negative for arthralgias and myalgias.   Skin:  Negative for rash.   Allergic/Immunologic: Negative for environmental allergies and food allergies.   Neurological:  Negative for tremors, seizures and syncope.   Hematological:  Negative for adenopathy. Does not bruise/bleed easily.   Psychiatric/Behavioral:  Negative for dysphoric mood. The patient is not nervous/anxious.     Objective:        Vitals:    04/03/23 0746   BP: (!) 140/80   Pulse: 60   SpO2: 96%   Weight: 112.4 kg (247 lb 12.8 oz)   Height: 5' 5" (1.651 m)       BMI Readings from Last 3 Encounters:   04/03/23 41.24 kg/m²   02/02/23 41.75 kg/m²   09/29/22 41.79 kg/m²        Wt Readings from Last 3 Encounters:   04/03/23 0746 112.4 kg (247 lb 12.8 oz)   02/02/23 0808 113.8 kg (250 lb 14.1 oz)   09/29/22 1020 113.9 kg (251 lb 1.7 oz)        BP Readings from Last 3 Encounters:   04/03/23 (!) 140/80   02/02/23 (!) 178/90   09/29/22 134/78        There are no preventive care reminders to display for this patient.     Health Maintenance Due   Topic Date Due    COVID-19 Vaccine (1) Never done    Shingles Vaccine (1 of 2) Never done         Past Medical History:   Diagnosis Date    Carotid artery stenosis     Coronary artery disease     Hyperlipidemia     Hypertension     Obesity     Paroxysmal supraventricular tachycardia        Past Surgical History:   Procedure Laterality Date    " APPENDECTOMY      BREAST BIOPSY Left     negative  needle asp  by Dr Olmedo    CARPAL TUNNEL RELEASE      CATARACT EXTRACTION BILATERAL W/ ANTERIOR VITRECTOMY      COLONOSCOPY N/A 12/18/2015    Procedure: COLONOSCOPY;  Surgeon: Timothy Syed Jr., MD;  Location: [facility];  Service: Endoscopy;  Laterality: N/A;repeatb in 3 yrs. Benign polyps w possible adenomatous change in transverse colon polyp..    EYE SURGERY      HAND SURGERY  01/01/2001    HYSTERECTOMY  1979    without BSO.    toenail extraction Right 2016    4th toe.       Social History     Tobacco Use    Smoking status: Never    Smokeless tobacco: Never   Substance Use Topics    Alcohol use: No     Comment: rarely.    Drug use: No       Family History   Problem Relation Age of Onset    Cancer Mother     Cancer Father     COPD Son     Ovarian cancer Neg Hx     Breast cancer Neg Hx        Review of patient's allergies indicates:   Allergen Reactions    Crestor [rosuvastatin]      Muscle ache    Lipitor [atorvastatin]      Muscle ache    Pravachol [pravastatin]      Muscle ache    Sulfa (sulfonamide antibiotics) Swelling    Welchol [colesevelam]      Muscle ache    Zocor [simvastatin]      Muscle ache    Influenza virus vaccines Swelling and Rash     2014 flu vaccination       Current Outpatient Medications on File Prior to Visit   Medication Sig Dispense Refill    acetaminophen (TYLENOL) 500 MG tablet Take 500 mg by mouth 3 (three) times daily as needed for Pain.      amLODIPine (NORVASC) 2.5 MG tablet TAKE 1 TABLET BY MOUTH EVERY DAY 90 tablet 2    ascorbic acid, vitamin C, (VITAMIN C) 500 MG tablet Take 500 mg by mouth once daily.      aspirin (ECOTRIN) 81 MG EC tablet Take 81 mg by mouth once daily. Three times a week Monday, Wednesday, Friday.      calcium-vitamin D 500-125 mg-unit tablet Take 1 tablet by mouth once daily.      cholestyramine (QUESTRAN) 4 gram packet Take 1 packet (4 g total) by mouth 2 (two) times daily. Mix in 8 oz of fluid 2x a day 180  packet 3    ezetimibe (ZETIA) 10 mg tablet Take 1 tablet (10 mg total) by mouth every evening. Generic please. 90 tablet 3    furosemide (LASIX) 20 MG tablet Take 1 tablet (20 mg total) by mouth 2 (two) times daily. Generic please; please hold to present prescription of 90 days is almost used up; then contact patient 90 tablet 1    GLUCOSAMINE HCL/CHONDR VILLASENOR A NA (OSTEO BI-FLEX ORAL) Take by mouth once daily.      hydroCHLOROthiazide (HYDRODIURIL) 12.5 MG Tab TAKE 1 TABLET BY MOUTH EVERY TUESDAY, THURSDAY, SATURDAY, AND SUNDAY. 48 tablet 2    methylcellulose, laxative, 500 mg Tab Take by mouth once daily.      metoprolol succinate (TOPROL-XL) 25 MG 24 hr tablet TAKE 1 TABLET BY MOUTH EVERY DAY 90 tablet 3    multivitamin (THERAGRAN) per tablet Take 1 tablet by mouth once daily.      mv-min-FA-D3-om3-dha-epa-fish 200 mcg-500 unit-200 mg Cap Take 1 tablet by mouth once daily.       nystatin (MYCOSTATIN) cream Apply topically 2 (two) times daily. 30 g 2    potassium chloride (MICRO-K) 10 MEQ CpSR Take 1 capsule (10 mEq total) by mouth once daily. 90 capsule 1    triamcinolone (NASACORT) 55 mcg nasal inhaler 2 sprays by Nasal route once daily.      UBIDECARENONE (COENZYME Q10) 100 mg Tab Take 100 mg by mouth once daily.      vitamin E 400 UNIT capsule Take 400 Units by mouth once daily.      VITAMIN E/FLAXSEED OIL (FLAXSEED OIL-VITAMIN E) 1,000-1 mg-unit Cap Take 1,000 mg by mouth once daily.       No current facility-administered medications on file prior to visit.       Physical Exam  Vitals reviewed.   Constitutional:       Appearance: She is well-developed.   HENT:      Head: Normocephalic and atraumatic.   Neck:      Thyroid: No thyromegaly.   Cardiovascular:      Rate and Rhythm: Normal rate and regular rhythm.      Heart sounds: Normal heart sounds. No murmur heard.    No gallop.      Comments: Systolic ejection murmur:  3/6 aortic area; 2/6 pulmonic area; 2/6 left lower sternal border as well.  Patient with  moderate aortic stenosis asymptomatic  Pulmonary:      Effort: Pulmonary effort is normal. No respiratory distress.      Breath sounds: Normal breath sounds. No wheezing or rales.   Abdominal:      General: Bowel sounds are normal. There is no distension.      Palpations: Abdomen is soft.      Tenderness: There is no abdominal tenderness. There is no guarding or rebound.   Musculoskeletal:         General: Normal range of motion.      Cervical back: Normal range of motion and neck supple.      Right lower leg: Edema present.      Left lower leg: Edema present.      Comments: LLE>RLE edema w no calf tenderness to palp. Spider veins noted.    Lymphadenopathy:      Cervical: No cervical adenopathy.   Skin:     Findings: No rash.   Neurological:      Mental Status: She is alert and oriented to person, place, and time.      Comments: Moves all 4 extremities fine.   Psychiatric:         Behavior: Behavior normal.         Thought Content: Thought content normal.       Assessment:       1. Benign essential HTN    2. Other hyperlipidemia    3. Dyslipidemia    4. Statin intolerance    5. Aortic atherosclerosis    6. Coronary artery disease involving native coronary artery of native heart without angina pectoris    7. Impaired glucose tolerance    8. Bilateral lower extremity edema    9. Morbid obesity    10. Normocytic anemia    11. Moderate aortic stenosis    12. Edema of both lower extremities due to peripheral venous insufficiency    13. Encounter for laboratory test        Plan:       Benign essential HTN: Maintain < 2 Gm Na a day diet, and monitor BP at home; keep a log. Add losartan 25 mg a day to her meds.  Manually blood pressure here today 140/80; at home average blood pressure is 140/75 but needs to wait 4-5 minutes after being seated before running her blood pressure cuff in the morning.  Continue amlodipine 2.5 mg per day; Lasix 20 mg per day; metoprolol 25 mg per day; and HCTZ 12.5 mg 4 times a week.  New blood  pressure cuff and has change to decaf coffee.  -     losartan (COZAAR) 25 MG tablet; Take 1 tablet (25 mg total) by mouth once daily. Generic  Dispense: 90 tablet; Refill: 1    Other hyperlipidemia: Maintain low fat high fiber diet, exercise regularly. Weight reduction where indicated.  Cont zetia and Questran daily. Add metamucil gummies 2-3 a day. Add Nexletol 180 mg a day.  Lipid profile:  Cholesterol 216/triglyceride 72/HDL 46/.6 down from 179.6; continue Zetia and Questran.  Lipid profile with CMP for follow-up labs  -     bempedoic acid (NEXLETOL) 180 mg Tab; Take 1 tablet (180 mg total) by mouth Daily.  Dispense: 30 tablet; Refill: 2    Dyslipidemia:  Increase aerobic activity in addition to above  -     bempedoic acid (NEXLETOL) 180 mg Tab; Take 1 tablet (180 mg total) by mouth Daily.  Dispense: 30 tablet; Refill: 2    Statin intolerance; has tried several; lead to myalgias.   -     bempedoic acid (NEXLETOL) 180 mg Tab; Take 1 tablet (180 mg total) by mouth Daily.  Dispense: 30 tablet; Refill: 2    Coronary artery disease involving native coronary artery of native heart without angina pectoris: CAD has been stable without any chest pain, shortness of breath, or palpitations. Keep follow up with cardiologist as directed.   Sees Card Dr Rachel.  On Zetia and Questran.  On metoprolol succinate 25 mg daily  -     losartan (COZAAR) 25 MG tablet; Take 1 tablet (25 mg total) by mouth once daily. Generic  Dispense: 90 tablet; Refill: 1  -     bempedoic acid (NEXLETOL) 180 mg Tab; Take 1 tablet (180 mg total) by mouth Daily.  Dispense: 30 tablet; Refill: 2    Impaired glucose tolerance; Exercise recommended with weight reduction and low carb diet; we'll follow hemoglobin A1c's with you periodically.  -     losartan (COZAAR) 25 MG tablet; Take 1 tablet (25 mg total) by mouth once daily. Generic  Dispense: 90 tablet; Refill: 1    Bilateral lower extremity edema: Maintain less than 2 g sodium diet; elevate lower  extremities at home; use compression stockings if possible. On lasix 20 mg a day and HCTZ 4x a week.          -     losartan (COZAAR) 25 MG tablet; Take 1 tablet (25 mg total) by mouth once daily. Generic  Dispense: 90 tablet; Refill: 1    Edema of both lower extremities due to peripheral venous insufficiency: as above    Morbid obesity; Caloric restriction w regular exercise and weight reduction.  Smaller portions will also help.  BMI 41.24.  Has taken off about 3 lb since last visit    Normocytic anemia; Women's 50+ MVI one a day to cont. Eats little red meat. Add ferrous gluconate 324 mg a day. Iron levels w f/u.  Hemoglobin 11.3    Moderate aortic stenosis: has been stable w no dizziness or light headedness. No presyncope. Followed by dr Virginie garza.     Encounter for lab test: Labs reviewed and discussed with patient at length in ordered for follow-up

## 2023-05-16 DIAGNOSIS — R60.0 BILATERAL LOWER EXTREMITY EDEMA: ICD-10-CM

## 2023-05-16 RX ORDER — FUROSEMIDE 20 MG/1
TABLET ORAL
Qty: 180 TABLET | Refills: 1 | Status: SHIPPED | OUTPATIENT
Start: 2023-05-16

## 2023-05-16 NOTE — TELEPHONE ENCOUNTER
No care due was identified.  [facility] Embedded Care Due Messages. Reference number: [id number].   5/16/2023 12:11:41 AM CDT

## 2023-05-16 NOTE — TELEPHONE ENCOUNTER
Refill Routing Note   Medication(s) are not appropriate for processing by [facility] for the following reason(s):      Required vitals abnormal    [facility] action(s):  Defer None identified          Appointments  past 12m or future 3m with PCP    Date Provider   Last Visit   4/3/2023 Brett Garcia MD   Next Visit   7/11/2023 Brett Garcia MD   ED visits in past 90 days: 0        Note composed:9:59 AM 05/16/2023

## 2023-05-25 ENCOUNTER — OFFICE VISIT (OUTPATIENT)
Dept: FAMILY MEDICINE | Facility: CLINIC | Age: 78
End: 2023-05-25
Payer: MEDICARE

## 2023-05-25 ENCOUNTER — TELEPHONE (OUTPATIENT)
Dept: OBSTETRICS AND GYNECOLOGY | Facility: CLINIC | Age: 78
End: 2023-05-25
Payer: MEDICARE

## 2023-05-25 DIAGNOSIS — Z00.00 ENCOUNTER FOR PREVENTIVE HEALTH EXAMINATION: Primary | ICD-10-CM

## 2023-05-25 DIAGNOSIS — Z00.00 ENCOUNTER FOR MEDICARE ANNUAL WELLNESS EXAM: ICD-10-CM

## 2023-05-25 DIAGNOSIS — E78.49 OTHER HYPERLIPIDEMIA: ICD-10-CM

## 2023-05-25 DIAGNOSIS — I25.10 CORONARY ARTERY DISEASE INVOLVING NATIVE CORONARY ARTERY OF NATIVE HEART WITHOUT ANGINA PECTORIS: ICD-10-CM

## 2023-05-25 DIAGNOSIS — N39.46 MIXED STRESS AND URGE URINARY INCONTINENCE: ICD-10-CM

## 2023-05-25 DIAGNOSIS — I70.0 AORTIC ATHEROSCLEROSIS: ICD-10-CM

## 2023-05-25 DIAGNOSIS — I47.10 PSVT (PAROXYSMAL SUPRAVENTRICULAR TACHYCARDIA): ICD-10-CM

## 2023-05-25 PROBLEM — R07.9 CHEST PAIN: Status: RESOLVED | Noted: 2019-02-17 | Resolved: 2023-05-25

## 2023-05-25 PROCEDURE — 3075F SYST BP GE 130 - 139MM HG: CPT | Mod: CPTII,S$GLB,, | Performed by: NURSE PRACTITIONER

## 2023-05-25 PROCEDURE — 3078F PR MOST RECENT DIASTOLIC BLOOD PRESSURE < 80 MM HG: ICD-10-PCS | Mod: CPTII,S$GLB,, | Performed by: NURSE PRACTITIONER

## 2023-05-25 PROCEDURE — 3078F DIAST BP <80 MM HG: CPT | Mod: CPTII,S$GLB,, | Performed by: NURSE PRACTITIONER

## 2023-05-25 PROCEDURE — 1126F PR PAIN SEVERITY QUANTIFIED, NO PAIN PRESENT: ICD-10-PCS | Mod: CPTII,S$GLB,, | Performed by: NURSE PRACTITIONER

## 2023-05-25 PROCEDURE — 3288F FALL RISK ASSESSMENT DOCD: CPT | Mod: CPTII,S$GLB,, | Performed by: NURSE PRACTITIONER

## 2023-05-25 PROCEDURE — G0439 PPPS, SUBSEQ VISIT: HCPCS | Mod: S$GLB,,, | Performed by: NURSE PRACTITIONER

## 2023-05-25 PROCEDURE — 1101F PT FALLS ASSESS-DOCD LE1/YR: CPT | Mod: CPTII,S$GLB,, | Performed by: NURSE PRACTITIONER

## 2023-05-25 PROCEDURE — 3075F PR MOST RECENT SYSTOLIC BLOOD PRESS GE 130-139MM HG: ICD-10-PCS | Mod: CPTII,S$GLB,, | Performed by: NURSE PRACTITIONER

## 2023-05-25 PROCEDURE — 1101F PR PT FALLS ASSESS DOC 0-1 FALLS W/OUT INJ PAST YR: ICD-10-PCS | Mod: CPTII,S$GLB,, | Performed by: NURSE PRACTITIONER

## 2023-05-25 PROCEDURE — 3288F PR FALLS RISK ASSESSMENT DOCUMENTED: ICD-10-PCS | Mod: CPTII,S$GLB,, | Performed by: NURSE PRACTITIONER

## 2023-05-25 PROCEDURE — 99999 PR PBB SHADOW E&M-EST. PATIENT-LVL V: CPT | Mod: PBBFAC,,, | Performed by: NURSE PRACTITIONER

## 2023-05-25 PROCEDURE — G0439 PR MEDICARE ANNUAL WELLNESS SUBSEQUENT VISIT: ICD-10-PCS | Mod: S$GLB,,, | Performed by: NURSE PRACTITIONER

## 2023-05-25 PROCEDURE — 99999 PR PBB SHADOW E&M-EST. PATIENT-LVL V: ICD-10-PCS | Mod: PBBFAC,,, | Performed by: NURSE PRACTITIONER

## 2023-05-25 PROCEDURE — 1126F AMNT PAIN NOTED NONE PRSNT: CPT | Mod: CPTII,S$GLB,, | Performed by: NURSE PRACTITIONER

## 2023-05-25 NOTE — PATIENT INSTRUCTIONS
Counseling and Referral of Other Preventative  (Italic type indicates deductible and co-insurance are waived)    Patient Name: Lanie Cavazos  Today's Date: 5/25/2023    Health Maintenance       Date Due Completion Date    COVID-19 Vaccine (1) Never done ---    Shingles Vaccine (1 of 2) Never done ---    Hemoglobin A1c (Prediabetes) 01/27/2024 1/27/2023    Lipid Panel 03/27/2024 3/27/2023    Aspirin/Antiplatelet Therapy 04/18/2024 4/18/2023    DEXA Scan 07/20/2024 7/20/2021    TETANUS VACCINE 04/06/2029 4/6/2019        No orders of the defined types were placed in this encounter.    The following information is provided to all patients.  This information is to help you find resources for any of the problems found today that may be affecting your health:                Living healthy guide: www.[facility].[place].gov      Understanding Diabetes: www.diabetes.org      Eating healthy: www.cdc.gov/healthyweight      [facility] home safety checklist: www.cdc.gov/steadi/patient.html      Agency on Aging: www.goea.[place].[facility]      Alcoholics anonymous (AA): www.aa.org      Physical Activity: www.robyn.nih.gov/az6wqmz      Tobacco use: www.quitwithusla.org

## 2023-05-25 NOTE — PROGRESS NOTES
"  Lanie Cavazos presented for a  Medicare AWV and comprehensive Health Risk Assessment today. The following components were reviewed and updated:    Medical history  Family History  Social history  Allergies and Current Medications  Health Risk Assessment  Health Maintenance  Care Team         ** See Completed Assessments for Annual Wellness Visit within the encounter summary.**         The following assessments were completed:  Living Situation  CAGE  Depression Screening  Timed Get Up and Go  Whisper Test  Cognitive Function Screening      Nutrition Screening  ADL Screening  PAQ Screening    Review for Opioid Screening: Patient does not have rx for Opioids.    Review for Substance Use Disorders: Patient does not use substance.     Vitals:    05/25/23 0915   BP: 136/72   BP Location: Left arm   Patient Position: Sitting   BP Method: Medium (Manual)   Pulse: (!) 59   SpO2: 97%   Weight: 113.7 kg (250 lb 10.6 oz)   Height: 5' 5" (1.651 m)     Body mass index is 41.71 kg/m².  Physical Exam  Vitals reviewed.   Constitutional:       General: She is not in acute distress.  HENT:      Head: Normocephalic.   Cardiovascular:      Rate and Rhythm: Normal rate.   Pulmonary:      Effort: Pulmonary effort is normal. No respiratory distress.   Skin:     General: Skin is warm.   Neurological:      Mental Status: She is alert.   Psychiatric:         Mood and Affect: Mood normal.             Diagnoses and health risks identified today and associated recommendations/orders:    1. Encounter for preventive health examination  Reviewed health maintenance and provided recommendations   Recommend shingrix vaccine     2. Aortic atherosclerosis  Continue to monitor  Followed by Dr Rachel  Cxr 12/29/17.      3. PSVT (paroxysmal supraventricular tachycardia)  Continue to monitor  Followed by Dr. Rachel    4. Coronary artery disease involving native coronary artery of native heart without angina pectoris  Continue to monitor  Followed by Dr" Arena  No CP.      5. Other hyperlipidemia  Continue to monitor  Followed by Brett Garcia MD .      6. Mixed stress and urge urinary incontinence    - Ambulatory referral/consult to Physical/Occupational Therapy; Future    7. Encounter for Medicare annual wellness exam    - Ambulatory Referral/Consult to Enhanced Annual Wellness Visit (eAWV)      Provided Lanie with a 5-10 year written screening schedule and personal prevention plan. Recommendations were developed using the USPSTF age appropriate recommendations. Education, counseling, and referrals were provided as needed. After Visit Summary printed and given to patient which includes a list of additional screenings\tests needed.    Follow up in one year    WAYNE Brito offered to discuss advanced care planning, including how to pick a person who would make decisions for you if you were unable to make them for yourself, called a health care power of , and what kind of decisions you might make such as use of life sustaining treatments such as ventilators and tube feeding when faced with a life limiting illness recorded on a living will that they will need to know. (How you want to be cared for as you near the end of your natural life)     X Patient is interested in learning more about how to make advanced directives.  I provided them paperwork and offered to discuss this with them.

## 2023-06-01 ENCOUNTER — OFFICE VISIT (OUTPATIENT)
Dept: OBSTETRICS AND GYNECOLOGY | Facility: CLINIC | Age: 78
End: 2023-06-01
Payer: MEDICARE

## 2023-06-01 VITALS
BODY MASS INDEX: 41.29 KG/M2 | HEIGHT: 65 IN | WEIGHT: 247.81 LBS | SYSTOLIC BLOOD PRESSURE: 120 MMHG | DIASTOLIC BLOOD PRESSURE: 70 MMHG

## 2023-06-01 DIAGNOSIS — N32.81 OAB (OVERACTIVE BLADDER): Primary | ICD-10-CM

## 2023-06-01 DIAGNOSIS — N39.46 MIXED INCONTINENCE: ICD-10-CM

## 2023-06-01 DIAGNOSIS — L90.0 LICHEN SCLEROSUS ET ATROPHICUS: ICD-10-CM

## 2023-06-01 PROCEDURE — 1101F PR PT FALLS ASSESS DOC 0-1 FALLS W/OUT INJ PAST YR: ICD-10-PCS | Mod: CPTII,S$GLB,, | Performed by: OBSTETRICS & GYNECOLOGY

## 2023-06-01 PROCEDURE — 1126F AMNT PAIN NOTED NONE PRSNT: CPT | Mod: CPTII,S$GLB,, | Performed by: OBSTETRICS & GYNECOLOGY

## 2023-06-01 PROCEDURE — 1159F MED LIST DOCD IN RCRD: CPT | Mod: CPTII,S$GLB,, | Performed by: OBSTETRICS & GYNECOLOGY

## 2023-06-01 PROCEDURE — 1159F PR MEDICATION LIST DOCUMENTED IN MEDICAL RECORD: ICD-10-PCS | Mod: CPTII,S$GLB,, | Performed by: OBSTETRICS & GYNECOLOGY

## 2023-06-01 PROCEDURE — 3074F SYST BP LT 130 MM HG: CPT | Mod: CPTII,S$GLB,, | Performed by: OBSTETRICS & GYNECOLOGY

## 2023-06-01 PROCEDURE — 3288F PR FALLS RISK ASSESSMENT DOCUMENTED: ICD-10-PCS | Mod: CPTII,S$GLB,, | Performed by: OBSTETRICS & GYNECOLOGY

## 2023-06-01 PROCEDURE — 3288F FALL RISK ASSESSMENT DOCD: CPT | Mod: CPTII,S$GLB,, | Performed by: OBSTETRICS & GYNECOLOGY

## 2023-06-01 PROCEDURE — 99214 PR OFFICE/OUTPT VISIT, EST, LEVL IV, 30-39 MIN: ICD-10-PCS | Mod: S$GLB,,, | Performed by: OBSTETRICS & GYNECOLOGY

## 2023-06-01 PROCEDURE — 3078F PR MOST RECENT DIASTOLIC BLOOD PRESSURE < 80 MM HG: ICD-10-PCS | Mod: CPTII,S$GLB,, | Performed by: OBSTETRICS & GYNECOLOGY

## 2023-06-01 PROCEDURE — 1101F PT FALLS ASSESS-DOCD LE1/YR: CPT | Mod: CPTII,S$GLB,, | Performed by: OBSTETRICS & GYNECOLOGY

## 2023-06-01 PROCEDURE — 99214 OFFICE O/P EST MOD 30 MIN: CPT | Mod: S$GLB,,, | Performed by: OBSTETRICS & GYNECOLOGY

## 2023-06-01 PROCEDURE — 3074F PR MOST RECENT SYSTOLIC BLOOD PRESSURE < 130 MM HG: ICD-10-PCS | Mod: CPTII,S$GLB,, | Performed by: OBSTETRICS & GYNECOLOGY

## 2023-06-01 PROCEDURE — 99999 PR PBB SHADOW E&M-EST. PATIENT-LVL IV: CPT | Mod: PBBFAC,,, | Performed by: OBSTETRICS & GYNECOLOGY

## 2023-06-01 PROCEDURE — 3078F DIAST BP <80 MM HG: CPT | Mod: CPTII,S$GLB,, | Performed by: OBSTETRICS & GYNECOLOGY

## 2023-06-01 PROCEDURE — 1126F PR PAIN SEVERITY QUANTIFIED, NO PAIN PRESENT: ICD-10-PCS | Mod: CPTII,S$GLB,, | Performed by: OBSTETRICS & GYNECOLOGY

## 2023-06-01 PROCEDURE — 99999 PR PBB SHADOW E&M-EST. PATIENT-LVL IV: ICD-10-PCS | Mod: PBBFAC,,, | Performed by: OBSTETRICS & GYNECOLOGY

## 2023-06-01 RX ORDER — MIRABEGRON 25 MG/1
25 TABLET, FILM COATED, EXTENDED RELEASE ORAL DAILY
Qty: 30 TABLET | Refills: 11 | Status: SHIPPED | OUTPATIENT
Start: 2023-06-01 | End: 2024-05-31

## 2023-06-01 RX ORDER — LOSARTAN POTASSIUM AND HYDROCHLOROTHIAZIDE 12.5; 5 MG/1; MG/1
1 TABLET ORAL DAILY
Status: ON HOLD | COMMUNITY
Start: 2023-05-27 | End: 2023-10-05 | Stop reason: HOSPADM

## 2023-06-01 RX ORDER — CLOBETASOL PROPIONATE 0.5 MG/G
CREAM TOPICAL 2 TIMES DAILY
Qty: 45 G | Refills: 1 | Status: SHIPPED | OUTPATIENT
Start: 2023-06-01 | End: 2023-10-18 | Stop reason: SDUPTHER

## 2023-06-01 NOTE — PROGRESS NOTES
Chief Complaint   Patient presents with    rash with bumps       History of Present Illness: Lanie Cavazos is a 77 y.o. female that presents today 6/1/2023 for   Chief Complaint   Patient presents with    rash with bumps   For 1 years, trial cortisone cream with no relief.  No itching just burning    Reports getting up at night to void with incontinence.  She denies vaginal discharge.     Past Medical History:   Diagnosis Date    Carotid artery stenosis     Coronary artery disease     Hyperlipidemia     Hypertension     Obesity     Paroxysmal supraventricular tachycardia        Past Surgical History:   Procedure Laterality Date    APPENDECTOMY      BREAST BIOPSY Left     negative  needle asp  by Dr Olmedo    CARPAL TUNNEL RELEASE      CATARACT EXTRACTION BILATERAL W/ ANTERIOR VITRECTOMY      COLONOSCOPY N/A 12/18/2015    Procedure: COLONOSCOPY;  Surgeon: Timothy Syed Jr., MD;  Location: [facility];  Service: Endoscopy;  Laterality: N/A;repeatb in 3 yrs. Benign polyps w possible adenomatous change in transverse colon polyp..    EYE SURGERY      HAND SURGERY  01/01/2001    HYSTERECTOMY  1979    without BSO.    toenail extraction Right 2016    4th toe.       Current Outpatient Medications   Medication Sig Dispense Refill    acetaminophen (TYLENOL) 500 MG tablet Take 500 mg by mouth 3 (three) times daily as needed for Pain.      amLODIPine (NORVASC) 2.5 MG tablet TAKE 1 TABLET BY MOUTH EVERY DAY 90 tablet 2    ascorbic acid, vitamin C, (VITAMIN C) 500 MG tablet Take 500 mg by mouth once daily.      aspirin (ECOTRIN) 81 MG EC tablet Take 81 mg by mouth once daily. Three times a week Monday, Wednesday, Friday.      bempedoic acid (NEXLETOL) 180 mg Tab Take 1 tablet (180 mg total) by mouth Daily. 30 tablet 2    calcium-vitamin D 500-125 mg-unit tablet Take 1 tablet by mouth once daily.      cholestyramine (QUESTRAN) 4 gram packet Take 1 packet (4 g total) by mouth 2 (two) times daily. Mix in 8 oz of fluid 2x a day 180  packet 3    ezetimibe (ZETIA) 10 mg tablet Take 1 tablet (10 mg total) by mouth every evening. Generic please. 90 tablet 3    ferrous gluconate (FERGON) 324 MG tablet Take 1 tablet (324 mg total) by mouth daily with breakfast. Take with vit C 500 mg; generic 30 tablet 2    furosemide (LASIX) 20 MG tablet TAKE 1 TABLET BY MOUTH 2 (TWO) TIMES DAILY. 180 tablet 1    GLUCOSAMINE HCL/CHONDR VILLASENOR A NA (OSTEO BI-FLEX ORAL) Take by mouth once daily.      hydroCHLOROthiazide (HYDRODIURIL) 12.5 MG Tab TAKE 1 TABLET BY MOUTH EVERY TUESDAY, THURSDAY, SATURDAY, AND SUNDAY. 48 tablet 2    losartan-hydrochlorothiazide 50-12.5 mg (HYZAAR) 50-12.5 mg per tablet Take 1 tablet by mouth.      methylcellulose, laxative, 500 mg Tab Take by mouth once daily.      metoprolol succinate (TOPROL-XL) 25 MG 24 hr tablet TAKE 1 TABLET BY MOUTH EVERY DAY 90 tablet 3    multivitamin (THERAGRAN) per tablet Take 1 tablet by mouth once daily.      mv-min-FA-D3-om3-dha-epa-fish 200 mcg-500 unit-200 mg Cap Take 1 tablet by mouth once daily.       nystatin (MYCOSTATIN) cream Apply topically 2 (two) times daily. 30 g 2    potassium chloride (MICRO-K) 10 MEQ CpSR Take 1 capsule (10 mEq total) by mouth once daily. 90 capsule 1    triamcinolone (NASACORT) 55 mcg nasal inhaler 2 sprays by Nasal route once daily.      UBIDECARENONE (COENZYME Q10) 100 mg Tab Take 100 mg by mouth once daily.      vitamin E 400 UNIT capsule Take 400 Units by mouth once daily.      VITAMIN E/FLAXSEED OIL (FLAXSEED OIL-VITAMIN E) 1,000-1 mg-unit Cap Take 1,000 mg by mouth once daily.      clobetasoL (TEMOVATE) 0.05 % cream Apply topically 2 (two) times daily. 45 g 1    mirabegron (MYRBETRIQ) 25 mg Tb24 ER tablet Take 1 tablet (25 mg total) by mouth once daily. 30 tablet 11     No current facility-administered medications for this visit.       Review of patient's allergies indicates:   Allergen Reactions    Crestor [rosuvastatin]      Muscle ache    Lipitor [atorvastatin]      Muscle  "ache    Pravachol [pravastatin]      Muscle ache    Sulfa (sulfonamide antibiotics) Swelling    Welchol [colesevelam]      Muscle ache    Zocor [simvastatin]      Muscle ache    Influenza virus vaccines Swelling and Rash      flu vaccination       Family History   Problem Relation Age of Onset    Cancer Mother     Cancer Father     COPD Son     Ovarian cancer Neg Hx     Breast cancer Neg Hx        Social History     Tobacco Use    Smoking status: Never    Smokeless tobacco: Never   Substance Use Topics    Alcohol use: No     Comment: rarely.    Drug use: No       OB History    Para Term  AB Living   4 4 4         SAB IAB Ectopic Multiple Live Births                  # Outcome Date GA Lbr Yariel/2nd Weight Sex Delivery Anes PTL Lv   4 Term            3 Term            2 Term            1 Term                    /70   Ht 5' 5" (1.651 m)   Wt 112.4 kg (247 lb 12.8 oz)   Physical Exam:  APPEARANCE: Well nourished, well developed, in no acute distress.  SKIN: Normal skin turgor, no lesions.  NECK: Neck symmetric without masses   RESPIRATORY: Normal respiratory effort with no retractions or use of accessory muscles  CARDIOVASCULAR: Peripheral vascular system with no swelling no varicosities and palpation of pulses normal  LYMPHATIC: No enlargements of the lymph nodes noted in the neck, axillae, or groin  ABDOMEN: Soft. No tenderness or masses. No hepatosplenomegaly. No hernias.  PELVIC: Normal external female genitalia with erythema of the bilateral gluteal folds Normal hair distribution. Adequate perineal body, normal urethral meatus. Urethra with no masses.  Bladder nontender. Vagina moist and well rugated without lesions or discharge.  No significant cystocele or rectocele. Adnexa without masses or tenderness. Urethra and bladder normal.  EXTREMITIES: No clubbing cyanosis or edema.    ASSESSMENT/PLAN:  OAB (overactive bladder)  -     mirabegron (MYRBETRIQ) 25 mg Tb24 ER tablet; Take 1 tablet (25 " mg total) by mouth once daily.  Dispense: 30 tablet; Refill: 11    Mixed incontinence  -     mirabegron (MYRBETRIQ) 25 mg Tb24 ER tablet; Take 1 tablet (25 mg total) by mouth once daily.  Dispense: 30 tablet; Refill: 11    Lichen sclerosus et atrophicus  -     clobetasoL (TEMOVATE) 0.05 % cream; Apply topically 2 (two) times daily.  Dispense: 45 g; Refill: 1      RTC 3 months.       30 minutes spent today spent preparing reviewing previous external notes, reviewing previous results, performing medical examination, ordering tests or medications, counseling and documenting.

## 2023-06-13 VITALS
BODY MASS INDEX: 41.77 KG/M2 | HEIGHT: 65 IN | OXYGEN SATURATION: 97 % | DIASTOLIC BLOOD PRESSURE: 72 MMHG | HEART RATE: 59 BPM | WEIGHT: 250.69 LBS | SYSTOLIC BLOOD PRESSURE: 136 MMHG

## 2023-06-25 DIAGNOSIS — D64.9 NORMOCYTIC ANEMIA: ICD-10-CM

## 2023-06-26 NOTE — TELEPHONE ENCOUNTER
Refill Routing Note   Medication(s) are not appropriate for processing by [facility] for the following reason(s):      Medication outside of protocol    [facility] action(s):  Route Care Due:  None identified          Appointments  past 12m or future 3m with PCP    Date Provider   Last Visit   4/3/2023 Brett Garcia MD   Next Visit   7/11/2023 Brett Garcia MD   ED visits in past 90 days: 0        Note composed:7:02 AM 06/26/2023

## 2023-06-29 RX ORDER — FERROUS GLUCONATE 324(38)MG
324 TABLET ORAL
Qty: 90 TABLET | Refills: 1 | Status: SHIPPED | OUTPATIENT
Start: 2023-06-29

## 2023-07-03 ENCOUNTER — LAB VISIT (OUTPATIENT)
Dept: LAB | Facility: HOSPITAL | Age: 78
End: 2023-07-03
Attending: INTERNAL MEDICINE
Payer: MEDICARE

## 2023-07-03 DIAGNOSIS — I10 BENIGN ESSENTIAL HTN: ICD-10-CM

## 2023-07-03 DIAGNOSIS — E78.49 OTHER HYPERLIPIDEMIA: ICD-10-CM

## 2023-07-03 DIAGNOSIS — I25.10 CORONARY ARTERY DISEASE INVOLVING NATIVE CORONARY ARTERY OF NATIVE HEART WITHOUT ANGINA PECTORIS: ICD-10-CM

## 2023-07-03 DIAGNOSIS — D64.9 NORMOCYTIC ANEMIA: ICD-10-CM

## 2023-07-03 DIAGNOSIS — R73.02 IMPAIRED GLUCOSE TOLERANCE: ICD-10-CM

## 2023-07-03 LAB
ALBUMIN SERPL BCP-MCNC: 3.6 G/DL (ref 3.5–5.2)
ALP SERPL-CCNC: 84 U/L (ref 55–135)
ALT SERPL W/O P-5'-P-CCNC: 8 U/L (ref 10–44)
ANION GAP SERPL CALC-SCNC: 10 MMOL/L (ref 8–16)
AST SERPL-CCNC: 13 U/L (ref 10–40)
BASOPHILS # BLD AUTO: 0.04 K/UL (ref 0–0.2)
BASOPHILS NFR BLD: 0.5 % (ref 0–1.9)
BILIRUB SERPL-MCNC: 0.6 MG/DL (ref 0.1–1)
BUN SERPL-MCNC: 17 MG/DL (ref 8–23)
CALCIUM SERPL-MCNC: 9.8 MG/DL (ref 8.7–10.5)
CHLORIDE SERPL-SCNC: 109 MMOL/L (ref 95–110)
CHOLEST SERPL-MCNC: 218 MG/DL (ref 120–199)
CHOLEST/HDLC SERPL: 5.3 {RATIO} (ref 2–5)
CO2 SERPL-SCNC: 23 MMOL/L (ref 23–29)
CREAT SERPL-MCNC: 0.7 MG/DL (ref 0.5–1.4)
DIFFERENTIAL METHOD: ABNORMAL
EOSINOPHIL # BLD AUTO: 0.2 K/UL (ref 0–0.5)
EOSINOPHIL NFR BLD: 2.8 % (ref 0–8)
ERYTHROCYTE [DISTWIDTH] IN BLOOD BY AUTOMATED COUNT: 15 % (ref 11.5–14.5)
EST. GFR  (NO RACE VARIABLE): >60 ML/MIN/1.73 M^2
ESTIMATED AVG GLUCOSE: 105 MG/DL (ref 68–131)
FERRITIN SERPL-MCNC: 214 NG/ML (ref 20–300)
FERRITIN SERPL-MCNC: 214 NG/ML (ref 20–300)
GLUCOSE SERPL-MCNC: 98 MG/DL (ref 70–110)
HBA1C MFR BLD: 5.3 % (ref 4–5.6)
HCT VFR BLD AUTO: 35 % (ref 37–48.5)
HDLC SERPL-MCNC: 41 MG/DL (ref 40–75)
HDLC SERPL: 18.8 % (ref 20–50)
HGB BLD-MCNC: 11.6 G/DL (ref 12–16)
IMM GRANULOCYTES # BLD AUTO: 0.04 K/UL (ref 0–0.04)
IMM GRANULOCYTES NFR BLD AUTO: 0.5 % (ref 0–0.5)
IRON SERPL-MCNC: 64 UG/DL (ref 30–160)
IRON SERPL-MCNC: 64 UG/DL (ref 30–160)
LDLC SERPL CALC-MCNC: 154.4 MG/DL (ref 63–159)
LYMPHOCYTES # BLD AUTO: 1.4 K/UL (ref 1–4.8)
LYMPHOCYTES NFR BLD: 17.5 % (ref 18–48)
MAGNESIUM SERPL-MCNC: 1.9 MG/DL (ref 1.6–2.6)
MCH RBC QN AUTO: 28.1 PG (ref 27–31)
MCHC RBC AUTO-ENTMCNC: 33.1 G/DL (ref 32–36)
MCV RBC AUTO: 85 FL (ref 82–98)
MONOCYTES # BLD AUTO: 0.7 K/UL (ref 0.3–1)
MONOCYTES NFR BLD: 8.9 % (ref 4–15)
NEUTROPHILS # BLD AUTO: 5.5 K/UL (ref 1.8–7.7)
NEUTROPHILS NFR BLD: 69.8 % (ref 38–73)
NONHDLC SERPL-MCNC: 177 MG/DL
NRBC BLD-RTO: 0 /100 WBC
PLATELET # BLD AUTO: 235 K/UL (ref 150–450)
PMV BLD AUTO: 10.6 FL (ref 9.2–12.9)
POTASSIUM SERPL-SCNC: 4.3 MMOL/L (ref 3.5–5.1)
PROT SERPL-MCNC: 7.3 G/DL (ref 6–8.4)
RBC # BLD AUTO: 4.13 M/UL (ref 4–5.4)
SATURATED IRON: 23 % (ref 20–50)
SATURATED IRON: 23 % (ref 20–50)
SODIUM SERPL-SCNC: 142 MMOL/L (ref 136–145)
TOTAL IRON BINDING CAPACITY: 283 UG/DL (ref 250–450)
TOTAL IRON BINDING CAPACITY: 283 UG/DL (ref 250–450)
TRANSFERRIN SERPL-MCNC: 191 MG/DL (ref 200–375)
TRANSFERRIN SERPL-MCNC: 191 MG/DL (ref 200–375)
TRIGL SERPL-MCNC: 113 MG/DL (ref 30–150)
WBC # BLD AUTO: 7.87 K/UL (ref 3.9–12.7)

## 2023-07-03 PROCEDURE — 80061 LIPID PANEL: CPT | Mod: HCNC | Performed by: INTERNAL MEDICINE

## 2023-07-03 PROCEDURE — 84466 ASSAY OF TRANSFERRIN: CPT | Mod: HCNC | Performed by: INTERNAL MEDICINE

## 2023-07-03 PROCEDURE — 82728 ASSAY OF FERRITIN: CPT | Mod: HCNC | Performed by: INTERNAL MEDICINE

## 2023-07-03 PROCEDURE — 36415 COLL VENOUS BLD VENIPUNCTURE: CPT | Mod: HCNC,PO | Performed by: INTERNAL MEDICINE

## 2023-07-03 PROCEDURE — 85025 COMPLETE CBC W/AUTO DIFF WBC: CPT | Mod: HCNC | Performed by: INTERNAL MEDICINE

## 2023-07-03 PROCEDURE — 80053 COMPREHEN METABOLIC PANEL: CPT | Mod: HCNC | Performed by: INTERNAL MEDICINE

## 2023-07-03 PROCEDURE — 83735 ASSAY OF MAGNESIUM: CPT | Mod: HCNC | Performed by: INTERNAL MEDICINE

## 2023-07-03 PROCEDURE — 83036 HEMOGLOBIN GLYCOSYLATED A1C: CPT | Mod: HCNC | Performed by: INTERNAL MEDICINE

## 2023-07-08 NOTE — LETTER
May 3, 2017    Elicia Cavazos  [address] [facility] [address]             [facility]  [address]  [facility] [address]  Phone: [phone] Dear Mrs. Cavazos:    We have tried to reach you by mychart unsuccessfully.    [facility] is committed to your overall health.  To help you get the most out of each of your visits, we will review your information to make sure you are up to date on all of your recommended tests and/or procedures.       Dr. Brett Garcia is a new provider to us and we may not have your complete medical records on file here at [facility].  We have requested his records from the [facility].  The records we have received so far show that you may be due for colon cancer screening, osteoporosis screening, and possibly some immunizations (flu, pneumonia, shingles, and tetanus).     Medicare does not cover all immunizations to be given in the clinic.  Check your benefits to ensure that you do not need to receive your immunizations at the pharmacy.     If you have had any of the above done at another facility, please bring the records or information with you so that your record at [facility] will be complete.     If you are currently taking medication, please bring it with you to your appointment for review.     Also, if you have any type of Advanced Directives, please bring them with you to your office visit so we may scan them into your chart.     If you have any questions or concerns, please don't hesitate to call.    Thank you for letting us care for you,  Selina Hagan LPN Clinical Care Coordinator  [facility]  [phone]        89 y/o male w/ a PMHx of anxiety, HTN, arrhythmia, CKD IV, Dementia, prostate CA, and pure hypercholesterolemia presents to the ED s/p fall. Per EMS, pt daughter heard pt fall and responded immediately, pt fell on the right side of his face, denies LOC. Pt noted to have productive cough. Pt not on blood thinners. No other complaints at this time. Hx limited secondary to dementia. PCP: Dr. Quesada. 88 y/o male w/ a PMHx of anxiety, HTN, arrhythmia, CKD IV, Dementia, prostate CA, and pure hypercholesterolemia presents to the ED s/p fall. Pt daughter reports pt likely fell off his bed as he always sits on the edge causing him to fall off and roll hitting his head on a bookshelf, denies LOC. Pt noted to have productive cough, pt daughter states pt has had an URI for the past week, she was sick prior. Pt not on blood thinners. No other complaints at this time. Hx limited secondary to dementia. PCP: Dr. Quesada.

## 2023-07-11 ENCOUNTER — OFFICE VISIT (OUTPATIENT)
Dept: FAMILY MEDICINE | Facility: CLINIC | Age: 78
End: 2023-07-11
Payer: MEDICARE

## 2023-07-11 DIAGNOSIS — R60.0 BILATERAL LOWER EXTREMITY EDEMA: ICD-10-CM

## 2023-07-11 DIAGNOSIS — I70.0 AORTIC ATHEROSCLEROSIS: ICD-10-CM

## 2023-07-11 DIAGNOSIS — I35.0 MODERATE AORTIC STENOSIS: ICD-10-CM

## 2023-07-11 DIAGNOSIS — E66.01 MORBID OBESITY: ICD-10-CM

## 2023-07-11 DIAGNOSIS — I25.10 CORONARY ARTERY DISEASE INVOLVING NATIVE CORONARY ARTERY OF NATIVE HEART WITHOUT ANGINA PECTORIS: ICD-10-CM

## 2023-07-11 DIAGNOSIS — R06.09 DYSPNEA ON EXERTION: ICD-10-CM

## 2023-07-11 DIAGNOSIS — D64.9 NORMOCYTIC ANEMIA: ICD-10-CM

## 2023-07-11 DIAGNOSIS — Z78.9 STATIN INTOLERANCE: ICD-10-CM

## 2023-07-11 DIAGNOSIS — I10 PRIMARY HYPERTENSION: Primary | ICD-10-CM

## 2023-07-11 DIAGNOSIS — R73.02 IMPAIRED GLUCOSE TOLERANCE: ICD-10-CM

## 2023-07-11 DIAGNOSIS — Z01.89 ENCOUNTER FOR LABORATORY TEST: ICD-10-CM

## 2023-07-11 DIAGNOSIS — E78.49 OTHER HYPERLIPIDEMIA: ICD-10-CM

## 2023-07-11 PROCEDURE — 1160F PR REVIEW ALL MEDS BY PRESCRIBER/CLIN PHARMACIST DOCUMENTED: ICD-10-PCS | Mod: HCNC,CPTII,S$GLB, | Performed by: INTERNAL MEDICINE

## 2023-07-11 PROCEDURE — 3074F PR MOST RECENT SYSTOLIC BLOOD PRESSURE < 130 MM HG: ICD-10-PCS | Mod: HCNC,CPTII,S$GLB, | Performed by: INTERNAL MEDICINE

## 2023-07-11 PROCEDURE — 3288F FALL RISK ASSESSMENT DOCD: CPT | Mod: HCNC,CPTII,S$GLB, | Performed by: INTERNAL MEDICINE

## 2023-07-11 PROCEDURE — 1126F PR PAIN SEVERITY QUANTIFIED, NO PAIN PRESENT: ICD-10-PCS | Mod: HCNC,CPTII,S$GLB, | Performed by: INTERNAL MEDICINE

## 2023-07-11 PROCEDURE — 1160F RVW MEDS BY RX/DR IN RCRD: CPT | Mod: HCNC,CPTII,S$GLB, | Performed by: INTERNAL MEDICINE

## 2023-07-11 PROCEDURE — 1101F PR PT FALLS ASSESS DOC 0-1 FALLS W/OUT INJ PAST YR: ICD-10-PCS | Mod: HCNC,CPTII,S$GLB, | Performed by: INTERNAL MEDICINE

## 2023-07-11 PROCEDURE — 1101F PT FALLS ASSESS-DOCD LE1/YR: CPT | Mod: HCNC,CPTII,S$GLB, | Performed by: INTERNAL MEDICINE

## 2023-07-11 PROCEDURE — 1159F PR MEDICATION LIST DOCUMENTED IN MEDICAL RECORD: ICD-10-PCS | Mod: HCNC,CPTII,S$GLB, | Performed by: INTERNAL MEDICINE

## 2023-07-11 PROCEDURE — 99214 PR OFFICE/OUTPT VISIT, EST, LEVL IV, 30-39 MIN: ICD-10-PCS | Mod: HCNC,S$GLB,, | Performed by: INTERNAL MEDICINE

## 2023-07-11 PROCEDURE — 99999 PR PBB SHADOW E&M-EST. PATIENT-LVL V: ICD-10-PCS | Mod: PBBFAC,HCNC,, | Performed by: INTERNAL MEDICINE

## 2023-07-11 PROCEDURE — 3078F PR MOST RECENT DIASTOLIC BLOOD PRESSURE < 80 MM HG: ICD-10-PCS | Mod: HCNC,CPTII,S$GLB, | Performed by: INTERNAL MEDICINE

## 2023-07-11 PROCEDURE — 1159F MED LIST DOCD IN RCRD: CPT | Mod: HCNC,CPTII,S$GLB, | Performed by: INTERNAL MEDICINE

## 2023-07-11 PROCEDURE — 3074F SYST BP LT 130 MM HG: CPT | Mod: HCNC,CPTII,S$GLB, | Performed by: INTERNAL MEDICINE

## 2023-07-11 PROCEDURE — 1126F AMNT PAIN NOTED NONE PRSNT: CPT | Mod: HCNC,CPTII,S$GLB, | Performed by: INTERNAL MEDICINE

## 2023-07-11 PROCEDURE — 99999 PR PBB SHADOW E&M-EST. PATIENT-LVL V: CPT | Mod: PBBFAC,HCNC,, | Performed by: INTERNAL MEDICINE

## 2023-07-11 PROCEDURE — 3078F DIAST BP <80 MM HG: CPT | Mod: HCNC,CPTII,S$GLB, | Performed by: INTERNAL MEDICINE

## 2023-07-11 PROCEDURE — 3288F PR FALLS RISK ASSESSMENT DOCUMENTED: ICD-10-PCS | Mod: HCNC,CPTII,S$GLB, | Performed by: INTERNAL MEDICINE

## 2023-07-11 PROCEDURE — 99214 OFFICE O/P EST MOD 30 MIN: CPT | Mod: HCNC,S$GLB,, | Performed by: INTERNAL MEDICINE

## 2023-07-11 NOTE — PROGRESS NOTES
Subjective:       Patient ID: Lanie Cavazos is a 77 y.o. female.    Chief Complaint: Follow-up (Lab results)  HPI:  Patient here today for reassessment and go over lab results  Follow-up  Pertinent negatives include no abdominal pain, arthralgias, chest pain, congestion, coughing, fever, myalgias, nausea, rash or vomiting.   Primary hypertension; Maintain < 2 Gm Na a day diet, and monitor BP at home; keep a log. Cont present management; exercise w weight reduction; Add DASH Diet.  Blood pressure at home today 121/72  -     T4, Free; Future; Expected date: 07/11/2023  -     TSH; Future; Expected date: 07/11/2023  -     CBC Auto Differential; Future; Expected date: 07/11/2023  -     Comprehensive Metabolic Panel; Future; Expected date: 07/11/2023  -     Lipid Panel; Future; Expected date: 07/11/2023    Other hyperlipidemia: Maintain low fat high fiber diet, exercise regularly. Weight reduction where indicated.  Cont nexletol, questran 2x a day, and zetia. Will write for repatha   -     Comprehensive Metabolic Panel; Future; Expected date: 07/11/2023  -     Lipid Panel; Future; Expected date: 07/11/2023    Statin intolerance; have discussed Repatha but doesn't want to take injection medicine; to discuss w her card Dr Rachel. Can't tolerate statins; see list tried. On Nexletol, Questran, and Zetia w ; CAD/aortic atherosclerosis.   -     Lipid Panel; Future; Expected date: 07/11/2023    Coronary artery disease involving native coronary artery of native heart without angina pectoris  -     Comprehensive Metabolic Panel; Future; Expected date: 07/11/2023  -     Lipid Panel; Future; Expected date: 07/11/2023    Aortic atherosclerosis; ASA 81 mg a day    Moderate aortic stenosis; followed by Dr Rachel; no light headedness or dizziness. No near syncope. Echo as per card.     Dyspnea on exertion; has been stable.  Increase her exercise regimen as tolerated with continued attempts at weight reduction    Impaired glucose  tolerance; Exercise recommended with weight reduction and low carb diet; we'll follow hemoglobin A1c's with you periodically.  -     Comprehensive Metabolic Panel; Future; Expected date: 07/11/2023    Morbid obesity; Caloric restriction w regular exercise and weight reduction. Try to exercise 4-5x a week total 25 minutes a day.   -     T4, Free; Future; Expected date: 07/11/2023  -     TSH; Future; Expected date: 07/11/2023  -     Comprehensive Metabolic Panel; Future; Expected date: 07/11/2023  -     Lipid Panel; Future; Expected date: 07/11/2023    Bilateral lower extremity edema; Maintain less than 2 g sodium diet; elevate lower extremities at home; use compression stockings if possible. Cont present management; elevate legs at home and place on coffee table. Suspect jonathan insufficiency as well.   -     Comprehensive Metabolic Panel; Future; Expected date: 07/11/2023    Normocytic anemia: stable; takes Centrum women's Silver MVI one a day. Has been stable.       Encounter for lab test: Labs reviewed and discussed with patient and ordered for follow-up    Review of Systems   Constitutional:  Negative for appetite change and fever.   HENT:  Negative for congestion, postnasal drip, rhinorrhea and sinus pressure.    Eyes:  Negative for discharge and itching.   Respiratory:  Negative for cough, chest tightness, shortness of breath and wheezing.         PRIETO which has been stable; BMI 41.42.   Cardiovascular:  Negative for chest pain, palpitations and leg swelling.   Gastrointestinal:  Negative for abdominal distention, abdominal pain, blood in stool, constipation, diarrhea, nausea and vomiting.   Endocrine: Negative for polydipsia, polyphagia and polyuria.   Genitourinary:  Negative for dysuria and hematuria.   Musculoskeletal:  Negative for arthralgias and myalgias.   Skin:  Negative for rash.   Allergic/Immunologic: Negative for environmental allergies and food allergies.   Neurological:  Negative for tremors, seizures  "and syncope.   Hematological:  Negative for adenopathy. Does not bruise/bleed easily.   Psychiatric/Behavioral:  Negative for dysphoric mood. The patient is not nervous/anxious.     Objective:        Vitals:    07/11/23 0810   BP: 138/80   Pulse: (!) 52   SpO2: 97%   Weight: 112.9 kg (248 lb 14.4 oz)   Height: 5' 5" (1.651 m)       BMI Readings from Last 3 Encounters:   07/11/23 41.42 kg/m²   06/01/23 41.24 kg/m²   05/25/23 41.71 kg/m²        Wt Readings from Last 3 Encounters:   07/11/23 0810 112.9 kg (248 lb 14.4 oz)   06/01/23 0832 112.4 kg (247 lb 12.8 oz)   05/25/23 0915 113.7 kg (250 lb 10.6 oz)        BP Readings from Last 3 Encounters:   07/11/23 138/80   06/01/23 120/70   05/25/23 136/72        There are no preventive care reminders to display for this patient.     Health Maintenance Due   Topic Date Due    COVID-19 Vaccine (1) Never done    Shingles Vaccine (1 of 2) Never done         Past Medical History:   Diagnosis Date    Carotid artery stenosis     Coronary artery disease     Hyperlipidemia     Hypertension     Obesity     Paroxysmal supraventricular tachycardia        Past Surgical History:   Procedure Laterality Date    APPENDECTOMY      BREAST BIOPSY Left     negative  needle asp  by Dr Olmedo    CARPAL TUNNEL RELEASE      CATARACT EXTRACTION BILATERAL W/ ANTERIOR VITRECTOMY      COLONOSCOPY N/A 12/18/2015    Procedure: COLONOSCOPY;  Surgeon: Timothy Syed Jr., MD;  Location: [facility];  Service: Endoscopy;  Laterality: N/A;repeatb in 3 yrs. Benign polyps w possible adenomatous change in transverse colon polyp..    EYE SURGERY      HAND SURGERY  01/01/2001    HYSTERECTOMY  1979    without BSO.    toenail extraction Right 2016    4th toe.       Social History     Tobacco Use    Smoking status: Never    Smokeless tobacco: Never   Substance Use Topics    Alcohol use: No     Comment: rarely.    Drug use: No       Family History   Problem Relation Age of Onset    Cancer Mother     Cancer Father     " COPD Son     Ovarian cancer Neg Hx     Breast cancer Neg Hx        Review of patient's allergies indicates:   Allergen Reactions    Crestor [rosuvastatin]      Muscle ache    Lipitor [atorvastatin]      Muscle ache    Pitavastatin      Stopped due to joint pain    Pravachol [pravastatin]      Muscle ache    Sulfa (sulfonamide antibiotics) Swelling    Welchol [colesevelam]      Muscle ache    Zocor [simvastatin]      Muscle ache    Influenza virus vaccines Swelling and Rash     2014 flu vaccination       Current Outpatient Medications on File Prior to Visit   Medication Sig Dispense Refill    acetaminophen (TYLENOL) 500 MG tablet Take 500 mg by mouth 3 (three) times daily as needed for Pain.      amLODIPine (NORVASC) 2.5 MG tablet TAKE 1 TABLET BY MOUTH EVERY DAY 90 tablet 2    ascorbic acid, vitamin C, (VITAMIN C) 500 MG tablet Take 500 mg by mouth once daily.      aspirin (ECOTRIN) 81 MG EC tablet Take 81 mg by mouth once daily. Three times a week Monday, Wednesday, Friday.      bempedoic acid (NEXLETOL) 180 mg Tab Take 1 tablet (180 mg total) by mouth Daily. 30 tablet 2    calcium-vitamin D 500-125 mg-unit tablet Take 1 tablet by mouth once daily.      cholestyramine (QUESTRAN) 4 gram packet Take 1 packet (4 g total) by mouth 2 (two) times daily. Mix in 8 oz of fluid 2x a day 180 packet 3    clobetasoL (TEMOVATE) 0.05 % cream Apply topically 2 (two) times daily. 45 g 1    ezetimibe (ZETIA) 10 mg tablet Take 1 tablet (10 mg total) by mouth every evening. Generic please. 90 tablet 3    ferrous gluconate (FERGON) 324 MG tablet TAKE 1 TABLET (324 MG TOTAL) BY MOUTH DAILY WITH BREAKFAST. TAKE WITH VIT C 500 MG GENERIC 90 tablet 1    furosemide (LASIX) 20 MG tablet TAKE 1 TABLET BY MOUTH 2 (TWO) TIMES DAILY. 180 tablet 1    GLUCOSAMINE HCL/CHONDR VILLASENOR A NA (OSTEO BI-FLEX ORAL) Take by mouth once daily.      hydroCHLOROthiazide (HYDRODIURIL) 12.5 MG Tab TAKE 1 TABLET BY MOUTH EVERY TUESDAY, THURSDAY, SATURDAY, AND SUNDAY.  48 tablet 2    losartan-hydrochlorothiazide 50-12.5 mg (HYZAAR) 50-12.5 mg per tablet Take 1 tablet by mouth.      methylcellulose, laxative, 500 mg Tab Take by mouth once daily.      metoprolol succinate (TOPROL-XL) 25 MG 24 hr tablet TAKE 1 TABLET BY MOUTH EVERY DAY 90 tablet 3    mirabegron (MYRBETRIQ) 25 mg Tb24 ER tablet Take 1 tablet (25 mg total) by mouth once daily. 30 tablet 11    multivitamin (THERAGRAN) per tablet Take 1 tablet by mouth once daily.      mv-min-FA-D3-om3-dha-epa-fish 200 mcg-500 unit-200 mg Cap Take 1 tablet by mouth once daily.       nystatin (MYCOSTATIN) cream Apply topically 2 (two) times daily. 30 g 2    potassium chloride (MICRO-K) 10 MEQ CpSR Take 1 capsule (10 mEq total) by mouth once daily. 90 capsule 1    triamcinolone (NASACORT) 55 mcg nasal inhaler 2 sprays by Nasal route once daily.      UBIDECARENONE (COENZYME Q10) 100 mg Tab Take 100 mg by mouth once daily.      vitamin E 400 UNIT capsule Take 400 Units by mouth once daily.      VITAMIN E/FLAXSEED OIL (FLAXSEED OIL-VITAMIN E) 1,000-1 mg-unit Cap Take 1,000 mg by mouth once daily.       No current facility-administered medications on file prior to visit.       Physical Exam  Vitals reviewed.   Constitutional:       Appearance: She is well-developed.   HENT:      Head: Normocephalic and atraumatic.   Neck:      Thyroid: No thyromegaly.   Cardiovascular:      Rate and Rhythm: Normal rate and regular rhythm.      Heart sounds: No murmur heard.    No gallop.      Comments: HOMER: 2-3/6 aortic, 2/6 pulm; 2/6 LLSB  Pulmonary:      Effort: Pulmonary effort is normal. No respiratory distress.      Breath sounds: Normal breath sounds. No wheezing or rales.   Abdominal:      General: Bowel sounds are normal. There is no distension.      Palpations: Abdomen is soft.      Tenderness: There is no abdominal tenderness. There is no guarding or rebound.   Musculoskeletal:         General: Normal range of motion.      Cervical back: Normal range  of motion and neck supple.      Right lower leg: Edema present.      Left lower leg: Edema present.      Comments: Obese LE's w jony spider veins. Left TKA; 3-4+ LLE w 3+ RLE edema; no calf tenderness to palp.    Lymphadenopathy:      Cervical: No cervical adenopathy.   Skin:     Findings: No rash.   Neurological:      Mental Status: She is alert and oriented to person, place, and time.      Comments: Moves all 4 extremities fine.   Psychiatric:         Behavior: Behavior normal.         Thought Content: Thought content normal.       Assessment:       1. Primary hypertension    2. Other hyperlipidemia    3. Statin intolerance    4. Coronary artery disease involving native coronary artery of native heart without angina pectoris    5. Aortic atherosclerosis    6. Moderate aortic stenosis    7. Dyspnea on exertion    8. Impaired glucose tolerance    9. Morbid obesity    10. Bilateral lower extremity edema    11. Normocytic anemia        Plan:       Primary hypertension; Maintain < 2 Gm Na a day diet, and monitor BP at home; keep a log. Cont present management; exercise w weight reduction; Add DASH Diet.  Blood pressure at home today 121/72  -     T4, Free; Future; Expected date: 07/11/2023  -     TSH; Future; Expected date: 07/11/2023  -     CBC Auto Differential; Future; Expected date: 07/11/2023  -     Comprehensive Metabolic Panel; Future; Expected date: 07/11/2023  -     Lipid Panel; Future; Expected date: 07/11/2023    Other hyperlipidemia: Maintain low fat high fiber diet, exercise regularly. Weight reduction where indicated.  Cont nexletol, questran 2x a day, and zetia. Will write for repatha   -     Comprehensive Metabolic Panel; Future; Expected date: 07/11/2023  -     Lipid Panel; Future; Expected date: 07/11/2023    Statin intolerance; have discussed Repatha but doesn't want to take injection medicine; to discuss w her card Dr Rachel. Can't tolerate statins; see list tried. On Nexletol, Questran, and Zetia w  ; CAD/aortic atherosclerosis.   -     Lipid Panel; Future; Expected date: 07/11/2023    Coronary artery disease involving native coronary artery of native heart without angina pectoris  -     Comprehensive Metabolic Panel; Future; Expected date: 07/11/2023  -     Lipid Panel; Future; Expected date: 07/11/2023    Aortic atherosclerosis; ASA 81 mg a day    Moderate aortic stenosis; followed by Dr Rachel; no light headedness or dizziness. No near syncope. Echo as per card.     Dyspnea on exertion; has been stable.  Try and increase her exercise regimen as tolerated with continued attempts at weight reduction.    Impaired glucose tolerance; Exercise recommended with weight reduction and low carb diet; we'll follow hemoglobin A1c's with you periodically.  -     Comprehensive Metabolic Panel; Future; Expected date: 07/11/2023    Morbid obesity; Caloric restriction w regular exercise and weight reduction. Try to exercise 4-5x a week total 25 minutes a day.   -     T4, Free; Future; Expected date: 07/11/2023  -     TSH; Future; Expected date: 07/11/2023  -     Comprehensive Metabolic Panel; Future; Expected date: 07/11/2023  -     Lipid Panel; Future; Expected date: 07/11/2023    Bilateral lower extremity edema; Maintain less than 2 g sodium diet; elevate lower extremities at home; use compression stockings if possible. Cont present management; elevate legs at home and place on coffee table. Suspect jonathan insufficiency as well.   -     Comprehensive Metabolic Panel; Future; Expected date: 07/11/2023    Normocytic anemia: stable; takes Centrum women's Silver MVI one a day. Has been stable.       Encounter for lab test: Labs reviewed and discussed with patient and ordered for follow-up

## 2023-07-11 NOTE — PATIENT INSTRUCTIONS
Primary hypertension; Maintain < 2 Gm Na a day diet, and monitor BP at home; keep a log. Cont present management; exercise w weight reduction; Add DASH Diet.   -     T4, Free; Future; Expected date: 07/11/2023  -     TSH; Future; Expected date: 07/11/2023  -     CBC Auto Differential; Future; Expected date: 07/11/2023  -     Comprehensive Metabolic Panel; Future; Expected date: 07/11/2023  -     Lipid Panel; Future; Expected date: 07/11/2023    Other hyperlipidemia: Maintain low fat high fiber diet, exercise regularly. Weight reduction where indicated.  Cont nexletol, questran 2x a day, and zetia. Will write for repatha   -     Comprehensive Metabolic Panel; Future; Expected date: 07/11/2023  -     Lipid Panel; Future; Expected date: 07/11/2023    Statin intolerance; have discussed Repatha but doesn't want to take injection medicine; to discuss w her card Dr Rachel. Can't tolerate statins; see list tried. On Nexletol, Questran, and Zetia w ; CAD/aortic atherosclerosis.   -     Lipid Panel; Future; Expected date: 07/11/2023    Coronary artery disease involving native coronary artery of native heart without angina pectoris  -     Comprehensive Metabolic Panel; Future; Expected date: 07/11/2023  -     Lipid Panel; Future; Expected date: 07/11/2023    Aortic atherosclerosis; ASA 81 mg a day    Moderate aortic stenosis; followed by Dr Rachel; no light headedness or dizziness. No near syncope. Echo as per card.     Dyspnea on exertion; has been stable.     Impaired glucose tolerance; Exercise recommended with weight reduction and low carb diet; we'll follow hemoglobin A1c's with you periodically.  -     Comprehensive Metabolic Panel; Future; Expected date: 07/11/2023    Morbid obesity; Caloric restriction w regular exercise and weight reduction. Try to exercise 4-5x a week total 25 minutes a day.   -     T4, Free; Future; Expected date: 07/11/2023  -     TSH; Future; Expected date: 07/11/2023  -     Comprehensive  Metabolic Panel; Future; Expected date: 07/11/2023  -     Lipid Panel; Future; Expected date: 07/11/2023    Bilateral lower extremity edema; Maintain less than 2 g sodium diet; elevate lower extremities at home; use compression stockings if possible. Cont present management; elevate legs at home and place on coffee table. Suspect jonathan insufficiency as well.   -     Comprehensive Metabolic Panel; Future; Expected date: 07/11/2023    Normocytic anemia: stable; takes Centrum women's Silver MVI one a day. Has been stable.

## 2023-07-20 ENCOUNTER — CLINICAL SUPPORT (OUTPATIENT)
Dept: REHABILITATION | Facility: HOSPITAL | Age: 78
End: 2023-07-20
Payer: MEDICARE

## 2023-07-20 DIAGNOSIS — N81.89 PELVIC FLOOR WEAKNESS IN FEMALE: ICD-10-CM

## 2023-07-20 DIAGNOSIS — R35.1 NOCTURIA MORE THAN TWICE PER NIGHT: ICD-10-CM

## 2023-07-20 DIAGNOSIS — N39.46 MIXED STRESS AND URGE URINARY INCONTINENCE: ICD-10-CM

## 2023-07-20 PROCEDURE — 97161 PT EVAL LOW COMPLEX 20 MIN: CPT | Mod: HCNC,PN

## 2023-07-20 PROCEDURE — 97112 NEUROMUSCULAR REEDUCATION: CPT | Mod: HCNC,PN

## 2023-07-20 PROCEDURE — 97110 THERAPEUTIC EXERCISES: CPT | Mod: HCNC,PN

## 2023-07-20 NOTE — PATIENT INSTRUCTIONS
Home Exercise Program: 07/20/2023    CONTROLLING URINARY / FECAL URGENCY    What to do when you experience a strong urge to urinate or defecate:     FIRST  Stop activity, stand quietly or sit down. Try to stay very still to maintain control. Avoid rushing to the toilet.    SECOND Begin Quick Flicks (1 second LIFT of pelvic floor muscles, 4 second DROP). Pelvic floor contractions send a message to the bladder to relax and hold urine.     THIRD Relax. Do not rush to the toilet. Take a deep belly or diaphragmatic breath and let it out slowly. Let the urge to urinate pass by using distraction techniques and positive thoughts. Try not to think about going to the bathroom.     FINALLY If the urge returns, repeat the above steps to regain control. When you feel the urge subside, walk normally to the bathroom. You can urinate once the urge has subsided.        The urge feeling strikes!   Stop, breathe, and be still and then begin Quick Flicks     Do Not rush to the toilet.     Think positively and distract yourself.

## 2023-07-20 NOTE — PLAN OF CARE
[facility]  Pelvic Health Physical Therapy Initial Evaluation    Date: 2023   Name: Mamta Cavazos  Clinic Number: [id number]  Therapy Diagnosis:   Encounter Diagnoses   Name Primary?    Mixed stress and urge urinary incontinence     Pelvic floor weakness in female     Nocturia more than twice per night      Physician: Alice Tobin,*    Physician Orders: PT Eval and Treat   Medical Diagnosis from Referral:   N39.46 (ICD-10-CM) - Mixed stress and urge urinary incontinence  Evaluation Date: 2023  Authorization Period Expiration: 24  Plan of Care Expiration: 23  Progress Note Due: 23  Visit # / Visits authorized:     FOTO: Issued Visit # 1     Time In: 9:16  Time Out: 10:10  Total Appointment Time (timed & untimed codes): 54 minutes    Precautions: universal    Subjective     Date of onset: 3-4 months    History of current condition - Mamta reports: Wakes with urinary urge. Leaks at night when trying to get to the bathroom. Feels pressure in low back pushing down and when her feet hit the floor she can't control urine.  Also has episodes of FI sporadically. Happens when stool is watery.     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 No HRT.  Sexually active? No  Pain with vaginal exams, intercourse or tampon use? No    Bladder/Bowel History:   Frequency of urination:   Daytime: 3-4 hours           Nighttime: 2-3x  Difficulty initiating urine stream: No  Urine stream: strong  Complete emptying: Yes  Bladder leakage: Yes  Frequency of incidents: Nightly when getting up to go to bathroom and with cough/sneeze  Amount leaked (urine): large squirt and moderate amount  Urinary Urgency: Yes - at night.   Frequency of bowel movements: once every 2 days  Difficulty initiating BM: Yes sometimes  Quality/Shape of BM: Upham Stool Chart 4,5 and sometimes 6  Does Patient Feel Empty after BM? Not  always  Fiber Supplements or Laxative Use?  No  Colon leakage: Yes  Frequency of incidents: occasional   Amount leaked (bowels): streaking/staining, small amount, and full movement  Form of protection: pad   Number of pads required in 24 hours: 2    PAIN:  Not an issue     Medical History: Mamta  has a past medical history of Carotid artery stenosis, Coronary artery disease, Hyperlipidemia, Hypertension, Obesity, and Paroxysmal supraventricular tachycardia.     Surgical History:  Mamta Cavazos  has a past surgical history that includes Carpal tunnel release; Appendectomy; Cataract extraction bilateral w/ anterior vitrectomy; Hand surgery (01/01/2001); Eye surgery; Hysterectomy (1979); toenail extraction (Right, 2016); Colonoscopy (N/A, 12/18/2015); and Breast biopsy (Left).    Medications: Mamta has a current medication list which includes the following prescription(s): acetaminophen, amlodipine, ascorbic acid (vitamin c), aspirin, nexletol, calcium-vitamin d, cholestyramine, clobetasol, ezetimibe, ferrous gluconate, furosemide, glucosamine/chondr pires a sod, hydrochlorothiazide, losartan-hydrochlorothiazide 50-12.5 mg, methylcellulose (laxative), metoprolol succinate, myrbetriq, multivitamin, mv-min-fa-d3-om3-dha-epa-fish, nystatin, potassium chloride, triamcinolone, coenzyme q10, vitamin e, and flaxseed oil-vitamin e.    Allergies:   Review of patient's allergies indicates:   Allergen Reactions    Crestor [rosuvastatin]      Muscle ache    Lipitor [atorvastatin]      Muscle ache    Pitavastatin      Stopped due to joint pain    Pravachol [pravastatin]      Muscle ache    Sulfa (sulfonamide antibiotics) Swelling    Welchol [colesevelam]      Muscle ache    Zocor [simvastatin]      Muscle ache    Influenza virus vaccines Swelling and Rash     2014 flu vaccination        Imaging none    Prior Therapy/Previous treatment included: none  Social History:  live with son and granddaughter  Current  exercise:walking  Occupation: Retired  Prior Level of Function: Able to make it to the bathroom without UI  Current Level of Function: Nocturia and night time UUI is affecting restorative sleep. FI affects social lift - doesn't want it to happen when she is out somewhere.    Types of fluid intake:  Coffee - 1 cup; cranapple juice, Sprite, water  Diet: salt free   Habitus:overweight  Abuse/Neglect: tbd    Pts goals: Improve nighttime bladder leakage    OBJECTIVE     See EMR under MEDIA for written consent provided 7/20/2023  Chaperone: declined    ORTHO SCREEN  Posture in sitting: slouched   Posture in standing: decreased lordosis and stands with excessive hip flexion  Pelvic alignment: no sign of deviations noted in supine   SI Joint Palpation: Denies tenderness to SI joint palpation bilaterally.  Sacral spring test: nt (Positive=NO spring)  Adductor Palpation: boggy    ABDOMINAL WALL ASSESSMENT  Palpation: hypotonic   Abdominal strength: Rectus abdominus: 4-/5     Transverse abdominus: 3+/5  Scarring: none  Pelvic Floor Muscle and Transverse Abdominus Synergy: present  Diastasis: present 2 fingers wide above umbilicus     BREATHING MECHANICS ASSESSMENT   Thorax Assessment During Quiet Respiration: Decreased excursion of abdominal wall  and Decreased excursion bilaterally of lateral ribs   Thorax Assessment During Deep Respiration: Decreased excursion of abdominal wall  and Decreased excursion bilaterally of lateral ribs     VAGINAL PELVIC FLOOR EXAM    EXTERNAL ASSESSMENT  Introitus: WNL  Skin condition: rash Lichen sclerosis  Scarring: episiotomy scar noted   Sensation: WNL   Pain:  none  Voluntary contraction: nil and accessory muscle use  Voluntary relaxation: nil  Involuntary contraction: bulge  Bearing down: bulge  Perineal descent: absent      INTERNAL ASSESSMENT  Pain: none   Sensation: able to sense generalized pressure, unable to identify location   Vaginal vault: roomy   Muscle Bulk: atrophy   Muscle  "Power: 0/5  Muscle Endurance: nt sec  # Reps To Fatigue: nt    Fast Contractions in 10 seconds: nt     Quality of contraction: unable to palpate; trace activity of PFM palpated with pt performing TA sets   Specificity: patient contracts: glutes   Coordination: tends to hold breath during PFM contration   Prolapse check:none    Limitation/Restriction for FOTO Urinary Problem Survey    Therapist reviewed FOTO scores for Mamta Cavazos on 7/20/2023.   FOTO documents entered into [employer] - see Media section.    Limitation Score: 43%       TREATMENT     Treatment Time In: 9:54  Treatment Time Out: 10:10  Total Treatment time (time-based codes) separate from Evaluation: 16 minutes      Therapeutic Exercise to develop  strength for 8 minutes including:   Glute sets 1x10 hold 3"  Hip adduction ball squeeze 1x10 hold 5"  Seated alternating hip abd with RTB 1x10 ea     Neuromuscular re-education activities to develop Coordination, Control, Down training, and Proprioception for 8 minutes including: diaphragmatic breathing and urge delay strategies   -Try urge delay strategies when you wake up with urge to urinate. Also practice during the day with urge to establish habit.     Patient Education provided:   general anatomy/physiology of urinary/ bowel  system and benefits of treatment were discussed with the pt. Additionally, anatomy/physiology of pelvic floor, bladder retraining, diaphragmatic breathing, and urge delay strategies were reviewed.     Home Exercises Provided:  Written Home Exercises Provided: yes.  Exercises were reviewed and Mamta was able to demonstrate them prior to the end of the session.    Mamta demonstrated good  understanding of the education provided.     See EMR under Patient Instructions for exercises provided 7/20/2023.    Assessment     Mamta is a 77 y.o. female referred to outpatient Physical Therapy with a medical diagnosis of mixed stress and urge incontinence. The patient reports chronic and " worsening urge > stress urinary incontinence that is affecting ADLs and social participation. Examination reveals pelvic floor dysfunction including weakness, decreased endurance and coordination deficits along with lumbopelvic dysfunction and decreased lower extremity strength. Pt is also noted to have abdominal wall and lower extremity weakness.  Pt also reports a history of chronic low back pain that affects ADL participation. The patient is expected to benefit from skilled intervention to work towards elimination of UUI/SCOUT needed to improve quality of life and ADLs participation and return towards prior level of function.        Pt prognosis is Fair  Pt will benefit from skilled outpatient Physical Therapy to address the deficits stated above and in the chart below, provide pt/family education, and to maximize pt's level of independence.     Plan of care discussed with patient: Yes  Pt's spiritual, cultural and educational needs considered and patient is agreeable to the plan of care and goals as stated below:       Anticipated Barriers for therapy: chronic LBP    Medical Necessity is demonstrated by the following    History  Co-morbidities and personal factors that may impact the plan of care Co-morbidities:   CAD, high BMI, HTN, and prior pelvic surgery    Personal Factors:   lifestyle     moderate   Examination  Body Structures and Functions, activity limitations and participation restrictions that may impact the plan of care Body Regions/Systems/Functions:  altered posture, poor trunk stability, decreased pelvic muscle strength, decreased endurance of the pelvic muscles, poor quality of pelvic muscle contraction, increased nocturia, and poor coordination of pelvic floor muscles during ADL's leading to urinary or fecal leakage     Activity Limitations:  urgency , sleep uninterrupted by excessive nocturia, and incontinence with ADLs    Participation Restrictions:  all ADLs/iADLs uninterrupted by urinary  incontinence/urgency/frequency, all ADLs/iADLs uninterrupted by fecal incontinence/urgency/frequency, Sleep restrictions, and exercise restrictions due to pain     Activity limitations:   Learning and applying knowledge  no deficits    General Tasks and Commands  no deficits    Communication  no deficits    Mobility  lifting and carrying objects  walking    Self care  toileting    Domestic Life  doing house work (cleaning house, washing dishes, laundry)    Interactions/Relationships  no deficits    Life Areas  no deficits    Community and Social Life  community life  recreation and leisure       moderate   Clinical Presentation stable and uncomplicated low   Decision Making/ Complexity Score: low       Goals:  Short Term Goals: 5 weeks   Pt to be edu pelvic muscle bracing and be able to consistently perform correctly and quickly to help decrease incontinence with cough/laugh/sneeze.  Pt to report a decrease in pad usage to no more than 1 a day to demonstrate improving pelvic floor function needed for continence.  Pt to demonstrate being able to correctly and consistently perform a kegel which is needed  to increase pelvic floor muscle coordination and strength needed for continence.  Pt to be able to delay the urge to urinate at least 5 minutes with a strong urge to urinate in order to make it to the bathroom without leaking.  Pt to report being able to transfer from sitting to standing and walk to the bathroom at night without leakage of urine.  Pt to voice understanding of the role that diet plays on urinary urgency.        Long Term Goals: 10 week  Pt to be discharged with home plan for carry over after discharge.    Pt to be able to perform a 5 second kegel x 10 reps to demonstrate improving strength and endurance needed for continence.  Pt to no longer need to wear a pad for protection due to reduction of urinary incontinence by at least 75% with ADLs.  Pt to report a decrease in nocturia getting up no more  than 1x at night to allow for improved restorative sleep.  Pt to report elimination of incontinence with ADLs to demonstrate improved pelvic floor muscle strength and coordination  Pt to demonstrate an improved score in the FOTO Urinary Problem survey  to at least 35% limitation to demonstrate improving continence.    Pt to increase pelvic floor strength to at least 3/5 to demonstrate improved strength needed for continence with ADLs.       Plan     Plan of care Certification: 7/20/2023 to 9/29/2023.    Outpatient Physical Therapy 1 times weekly for 10 weeks to include the following interventions: therapeutic exercises, therapeutic activity, neuromuscular re-education, manual therapy, patient/family education, and self care/home management    Audrey Pabon, PT

## 2023-07-23 VITALS
BODY MASS INDEX: 41.47 KG/M2 | HEART RATE: 52 BPM | WEIGHT: 248.88 LBS | OXYGEN SATURATION: 97 % | SYSTOLIC BLOOD PRESSURE: 121 MMHG | HEIGHT: 65 IN | DIASTOLIC BLOOD PRESSURE: 72 MMHG

## 2023-07-26 ENCOUNTER — TELEPHONE (OUTPATIENT)
Dept: FAMILY MEDICINE | Facility: CLINIC | Age: 78
End: 2023-07-26
Payer: MEDICARE

## 2023-07-27 ENCOUNTER — CLINICAL SUPPORT (OUTPATIENT)
Dept: REHABILITATION | Facility: HOSPITAL | Age: 78
End: 2023-07-27
Payer: MEDICARE

## 2023-07-27 DIAGNOSIS — N81.89 PELVIC FLOOR WEAKNESS IN FEMALE: Primary | ICD-10-CM

## 2023-07-27 DIAGNOSIS — R35.1 NOCTURIA MORE THAN TWICE PER NIGHT: ICD-10-CM

## 2023-07-27 PROCEDURE — 97110 THERAPEUTIC EXERCISES: CPT | Mod: HCNC,PN

## 2023-07-27 PROCEDURE — 97112 NEUROMUSCULAR REEDUCATION: CPT | Mod: HCNC,PN

## 2023-07-27 NOTE — PROGRESS NOTES
"  Pelvic Health Physical Therapy   Treatment Note     Name: Lanie Cavazos  Clinic Number: [id number]    Therapy Diagnosis:   Encounter Diagnoses   Name Primary?    Pelvic floor weakness in female Yes    Nocturia more than twice per night      Physician: Alice Tobin,*    Visit Date: 7/27/2023    Physician Orders: PT Eval and Treat   Medical Diagnosis from Referral:   N39.46 (ICD-10-CM) - Mixed stress and urge urinary incontinence  Evaluation Date: 7/20/2023  Authorization Period Expiration: 12/31/23  Plan of Care Expiration: 9/29/23  Progress Note Due: 8/20/23  Visit # / Visits authorized: 1/20 (+eval)     FOTO: Issued Visit # 1     Time In: 10:20  Time Out: 11:13  Total Billable Time: 53 minutes    Precautions: Standard    Subjective     Pt reports: Has put urge delay strategies into practice and has been able to control urine leakage better when getting to the toilet at night. Still has leakage but much less than before.   She was compliant with home exercise program.  Response to previous treatment: Initial eval  Functional change: Improving ability to control urge at night.     Pain: not an issue    Constitutional Symptoms Review: The patient denies having any constitutional symptoms.     Objective   Pt verbally consents to intravaginal treatment today.  Signed consent form already on file.     Therapeutic Exercise to develop  strength for 26 minutes including:   Glute sets 1x10 hold 3"  Hip adduction ball squeeze 2x10 hold 5"  Seated alternating hip abd with RTB 1x10 ea  TA sets 1x10   +Standing hip abduction 1x10 B at // bars (UE support)  +Standing hip extension 1x10 B at // bars (UE support)  +Standing band pulldown with TA contraction 2x10     Neuromuscular re-education activities to develop Coordination, Control, Down training, and Proprioception for 27 minutes including: diaphragmatic breathing and urge delay strategies   -TA sets in conjunction with Kegel for overflow strengthening     NMRE with " "EMG biofeedback using external anal sensors. Pt with resting tone of 3 microvolts. Pt performed Kegel endurance holds x10 reps on 5"/15" W/R cycle. Max recruitment 7.2mv; average 4.0mv.      Mamta received the following manual therapy techniques: to develop extensibility and desensitization for 0 minutes including:         Home Exercises Provided and Patient Education Provided     Education provided:   - general anatomy/physiology of urinary/ bowel  system and benefits of treatment were discussed with the pt. Additionally, anatomy/physiology of pelvic floor, bladder retraining, diaphragmatic breathing, and urge delay strategies were reviewed.   -TA co-contraction with Kegel  -EMG biofeedback  -HEP    Discussed progression of plan of care with patient; educated pt in activity modification; reviewed HEP with pt. Pt demonstrated and verbalized understanding of all instruction and was provided with a handout of HEP (see Patient Instructions).    Written Home Exercises Provided: yes.  Exercises were reviewed and Mamta was able to demonstrate them prior to the end of the session.  Mamta demonstrated good  understanding of the education provided.     See EMR under Patient Instructions for exercises provided 7/27/2023.    Assessment     Mamta returns for initial treatment session following eval. She reports some improvement in urinary leakage with urge at night using urge delay strategies. EMG biofeedback today shows weakness in peak and avg muscle recruitment poor endurance. Pt performed Kegel endurance holds with EMG biofeedback. Continued previous exercises and added standing hip and TA strengthening. Pt given red thera-band for home use.   Mamta Is progressing well towards her goals.   Pt prognosis is Fair.     Pt will continue to benefit from skilled outpatient physical therapy to address the deficits listed in the problem list box on initial evaluation, provide pt/family education and to maximize pt's level " of independence in the home and community environment.     Pt's spiritual, cultural and educational needs considered and pt agreeable to plan of care and goals.     Anticipated barriers to physical therapy: chronic LBP    Goals:  Short Term Goals: 5 weeks (ongoing)  Pt to be edu pelvic muscle bracing and be able to consistently perform correctly and quickly to help decrease incontinence with cough/laugh/sneeze.  Pt to report a decrease in pad usage to no more than 1 a day to demonstrate improving pelvic floor function needed for continence.  Pt to demonstrate being able to correctly and consistently perform a kegel which is needed  to increase pelvic floor muscle coordination and strength needed for continence.  Pt to be able to delay the urge to urinate at least 5 minutes with a strong urge to urinate in order to make it to the bathroom without leaking.  Pt to report being able to transfer from sitting to standing and walk to the bathroom at night without leakage of urine.  Pt to voice understanding of the role that diet plays on urinary urgency.                  Long Term Goals: 10 week  Pt to be discharged with home plan for carry over after discharge.    Pt to be able to perform a 5 second kegel x 10 reps to demonstrate improving strength and endurance needed for continence.  Pt to no longer need to wear a pad for protection due to reduction of urinary incontinence by at least 75% with ADLs.  Pt to report a decrease in nocturia getting up no more than 1x at night to allow for improved restorative sleep.  Pt to report elimination of incontinence with ADLs to demonstrate improved pelvic floor muscle strength and coordination  Pt to demonstrate an improved score in the FOTO Urinary Problem survey  to at least 35% limitation to demonstrate improving continence.    Pt to increase pelvic floor strength to at least 3/5 to demonstrate improved strength needed for continence with ADLs.     Plan     Plan of care  Certification: 7/20/2023 to 9/29/2023.     Outpatient Physical Therapy 1 times weekly for 10 weeks to include the following interventions: therapeutic exercises, therapeutic activity, neuromuscular re-education, manual therapy, patient/family education, and self care/home management    Audrey Pabon, PT

## 2023-08-02 ENCOUNTER — CLINICAL SUPPORT (OUTPATIENT)
Dept: REHABILITATION | Facility: HOSPITAL | Age: 78
End: 2023-08-02
Payer: MEDICARE

## 2023-08-02 DIAGNOSIS — R35.1 NOCTURIA MORE THAN TWICE PER NIGHT: ICD-10-CM

## 2023-08-02 DIAGNOSIS — N81.89 PELVIC FLOOR WEAKNESS IN FEMALE: Primary | ICD-10-CM

## 2023-08-02 PROCEDURE — 97110 THERAPEUTIC EXERCISES: CPT | Mod: HCNC,PN

## 2023-08-02 PROCEDURE — 97112 NEUROMUSCULAR REEDUCATION: CPT | Mod: HCNC,PN

## 2023-08-02 NOTE — PROGRESS NOTES
"  Pelvic Health Physical Therapy   Treatment Note     Name: Lanie Cavazos  Clinic Number: [id number]    Therapy Diagnosis:   Encounter Diagnoses   Name Primary?    Pelvic floor weakness in female Yes    Nocturia more than twice per night        Physician: Alice Tobin,*    Visit Date: 8/2/2023    Physician Orders: PT Eval and Treat   Medical Diagnosis from Referral:   N39.46 (ICD-10-CM) - Mixed stress and urge urinary incontinence  Evaluation Date: 7/20/2023  Authorization Period Expiration: 12/31/23  Plan of Care Expiration: 9/29/23  Progress Note Due: 8/20/23  Visit # / Visits authorized: 2/20 (+eval)     FOTO: Issued Visit # 1     Time In: 9:00  Time Out: 9:46  Total Billable Time: 46 minutes    Precautions: Standard    Subjective     Pt reports: Didn't wear a pad last night. Was able to perform urge delay strategies and control UUI on way to the bathroom the first time she woke up with urge but the second time she leaked on the way to bathroom.   Pt states she has a full amount of urine each time she voids at night (2-3x)  Pt does not take any medications at night that would cause nocturia. She does take HcTz in am.      She was compliant with home exercise program.  Response to previous treatment: Initial eval  Functional change: Improving ability to control urge at night.     Pain: not an issue    Constitutional Symptoms Review: The patient denies having any constitutional symptoms.     Objective   Pt verbally consents to intravaginal treatment today.  Signed consent form already on file.     Therapeutic Exercise to develop  strength for 13 minutes including:   Glute sets 1x10 hold 3"  Hip adduction ball squeeze 2x10 hold 5"  Seated alternating hip abd with RTB 1x10 ea  TA sets 1x10     Not performed:  Standing hip abduction 1x10 B at // bars (UE support)  Standing hip extension 1x10 B at // bars (UE support)  Standing band pulldown with TA contraction 2x10     Neuromuscular re-education activities to " "develop Coordination, Control, Down training, and Proprioception for 33 minutes including: diaphragmatic breathing and urge delay strategies   -TA sets in conjunction with Kegel for overflow strengthening   -Kegel endurance holds and quick flicks seated on towel roll 1x10 each (pt to begin 2x/day at home)  -Educated on bladder frequency, bladder health, bladder irritants.    Not performed:  NMRE with EMG biofeedback using external anal sensors. Pt with resting tone of 3 microvolts. Pt performed Kegel endurance holds x10 reps on 5"/15" W/R cycle. Max recruitment 7.2mv; average 4.0mv.      Mamta received the following manual therapy techniques: to develop extensibility and desensitization for 0 minutes including:         Home Exercises Provided and Patient Education Provided     Education provided:   - general anatomy/physiology of urinary/ bowel  system and benefits of treatment were discussed with the pt. Additionally, anatomy/physiology of pelvic floor, bladder retraining, diaphragmatic breathing, and urge delay strategies were reviewed.   -TA co-contraction with Kegel  -EMG biofeedback  -HEP  Quick Flicks   Perfor, a fast kegel (contract and LIFT the pelvic floor muscles as if you're trying to stop the stream of urine and passage of gas).    Make sure you're just using the internal muscles, not holding for longer than 1 second without holding your breath.  Let go and relax everything for 3-5 seconds.   Repeat 10 times, 2 sets per day.     Endurance Holds  Perform a long kegel (contract and LIFT the pelvic floor muscles as if you're trying to stop the stream of urine and passage of gas).    Make sure you're just using the internal muscles without holding your breath.  Let go and relax everything for 10 seconds.   Hold 5 seconds. Repeat 10 times, 2 sets per day.     Discussed progression of plan of care with patient; educated pt in activity modification; reviewed HEP with pt. Pt demonstrated and verbalized " understanding of all instruction and was provided with a handout of HEP (see Patient Instructions).    Written Home Exercises Provided: yes.  Exercises were reviewed and Mamta was able to demonstrate them prior to the end of the session.  Mamta demonstrated good  understanding of the education provided.     See EMR under Patient Instructions for exercises provided 7/27/2023, 8/2/23    Assessment     Mamta reports success with urge delay with initial urge to void at night but had UI the second time she went to bathroom at night. Did not wear a pad last night.   Educated pt regarding bladder function, irritants to avoid for frequency at night. Pt takes HcTz but in am; no meds at night which may increase nocturia. Pt encouraged to continue urge delay strategies with initial goal to be able to control UI when walking to bathroom at night with urge. Ultimate goal being reducing episodes of nocturia.   Worked on pt performing QF and endurance Kegels seated on towel roll for proprioceptive input. Pt to add to HEP 2x/day.   Continued overflow strengthening exercises.     Continued previous exercises and added standing hip and TA strengthening. Pt given red thera-band for home use.   Mamta Is progressing well towards her goals.   Pt prognosis is Fair.     Pt will continue to benefit from skilled outpatient physical therapy to address the deficits listed in the problem list box on initial evaluation, provide pt/family education and to maximize pt's level of independence in the home and community environment.     Pt's spiritual, cultural and educational needs considered and pt agreeable to plan of care and goals.     Anticipated barriers to physical therapy: chronic LBP    Goals:  Short Term Goals: 5 weeks (ongoing)  Pt to be edu pelvic muscle bracing and be able to consistently perform correctly and quickly to help decrease incontinence with cough/laugh/sneeze.  Pt to report a decrease in pad usage to no more than 1 a  day to demonstrate improving pelvic floor function needed for continence.  Pt to demonstrate being able to correctly and consistently perform a kegel which is needed  to increase pelvic floor muscle coordination and strength needed for continence.  Pt to be able to delay the urge to urinate at least 5 minutes with a strong urge to urinate in order to make it to the bathroom without leaking.  Pt to report being able to transfer from sitting to standing and walk to the bathroom at night without leakage of urine.  Pt to voice understanding of the role that diet plays on urinary urgency. (Met)                Long Term Goals: 10 week  Pt to be discharged with home plan for carry over after discharge.    Pt to be able to perform a 5 second kegel x 10 reps to demonstrate improving strength and endurance needed for continence.  Pt to no longer need to wear a pad for protection due to reduction of urinary incontinence by at least 75% with ADLs.  Pt to report a decrease in nocturia getting up no more than 1x at night to allow for improved restorative sleep.  Pt to report elimination of incontinence with ADLs to demonstrate improved pelvic floor muscle strength and coordination  Pt to demonstrate an improved score in the FOTO Urinary Problem survey  to at least 35% limitation to demonstrate improving continence.    Pt to increase pelvic floor strength to at least 3/5 to demonstrate improved strength needed for continence with ADLs.     Plan     Plan of care Certification: 7/20/2023 to 9/29/2023.     Outpatient Physical Therapy 1 times weekly for 10 weeks to include the following interventions: therapeutic exercises, therapeutic activity, neuromuscular re-education, manual therapy, patient/family education, and self care/home management    Audrey Pabon, PT

## 2023-08-02 NOTE — PATIENT INSTRUCTIONS
Home Exercise Program: 08/02/2023      BLADDER HEALTH      WHAT IS CONSIDERED NORMAL?  The average bladder can hold about 2 cups of urine before it needs to be emptied.  The normal range of voiding urine is 6 to 8 times during a 24 hour period, or every 3-4 hours. As we get older, our bladder capacity can get smaller and we may need to pass urine more frequently but usually not more than every 2 hours.  Urine should flow easily without discomfort in a good, steady stream until the bladder is empty. No pushing or straining is necessary to empty the bladder.  An urge is a normal signal that you feel as the bladder stretches to fill with urine. Urges can be felt even if the bladder is not full. Urges are not commands to go to the toilet, merely a signal and can be controlled.    WHAT ARE GOOD BLADDER HABITS?  Take your time when emptying your bladder. Dont strain or push to empty your bladder. Make sure you empty your bladder completely each time you pass urine. Do not rush the process.   Dont wait too long - consistently ignoring the urge to go (waiting more than 4 hours between toileting) or urinating too infrequently may be convenient but not healthy for your bladder. You can actually overstretch your bladder beyond its normal size.   Dont go too often - avoid going to the toilet just in case (more often than every 2 hours). It is usually not necessary to go when you feel the first urge. Try to go only when your bladder is full. Dont let your bladder control your life.    DIET CAN AFFECT THE BLADDER  Maintain a healthy fluid intake. Many people decrease their intake of liquids in hopes that they will need to urinate less frequently or have less urinary leakage. While a decrease in liquid intake does result in a decrease in the volume of urine, the smaller amount of urine may be more highly concentrated. Highly concentrated, dark yellow urine is irritating to the bladder surface and may actually cause you to go  to the bathroom more frequently. It also encourages the growth of bacteria, which may lead to infections resulting in incontinence.  Depending on your body size and environment, drink 4-8 cups (8 oz each) of fluid per day, spread throughout the entire day, unless otherwise advised by your doctor.    Avoid of use the following acidic food/beverages in moderation:  Alcoholic beverages    Tomato-based products  Vinegar  Coffee (regular and decaf)  Tea (regular and decaf)  Curran  Citrus fruits and juices  Spicy foods  Caffeinated beverages  Cola  Milk  Food colorings and flavorings  Artificial sweeteners  Chocolate    Try using these dietary substitutions:  Water: grape juice, apple juice  Fruit: pears, apricots, papaya, watermelon  Coffee: KAVA®, Postum®, Dirk®, Kaffree Enid®  Tea: Non-citrus herbal, sun-brewed tea  Vitamin C: Calcium carbonate co-buffered with calcium ascorbate    Avoid constipation by maintaining a balanced diet of dietary fiber. Typical dietary recommendations for fiber are between 25-35 grams per day. You should discuss your fiber needs with your physician, pharmacist or nutritionist.    Stop smoking. Smoking is irritating to the bladder surface and is associated with bladder cancer. In addition, the coughing associated with smoking may lead to increased incontinent episodes.     Home Exercise Program: 08/02/2023    Kegels     Quick Flicks   Perfor, a fast kegel (contract and LIFT the pelvic floor muscles as if you're trying to stop the stream of urine and passage of gas).    Make sure you're just using the internal muscles, not holding for longer than 1 second without holding your breath.  Let go and relax everything for 3-5 seconds.   Repeat 10 times, 2 sets per day.     Endurance Holds  Perform a long kegel (contract and LIFT the pelvic floor muscles as if you're trying to stop the stream of urine and passage of gas).    Make sure you're just using the internal muscles without holding your  breath.  Let go and relax everything for 10 seconds.   Hold 5 seconds. Repeat 10 times, 2 sets per day.

## 2023-08-09 NOTE — PROGRESS NOTES
"  Pelvic Health Physical Therapy   Treatment Note     Name: Lanie Cavazos  Clinic Number: [id number]    Therapy Diagnosis:   Encounter Diagnoses   Name Primary?    Pelvic floor weakness in female Yes    Nocturia more than twice per night          Physician: Alice Tobin,*    Visit Date: 8/10/2023    Physician Orders: PT Eval and Treat   Medical Diagnosis from Referral:   N39.46 (ICD-10-CM) - Mixed stress and urge urinary incontinence  Evaluation Date: 7/20/2023  Authorization Period Expiration: 12/31/23  Plan of Care Expiration: 9/29/23  Progress Note Due: 8/20/23  Visit # / Visits authorized: 3/20 (+eval)     FOTO: Issued Visit # 1     Time In:   Time Out:   Total Billable Time: 46 minutes    Precautions: Standard    Subjective     Pt reports: Has been working on urge delay. Still getting up 2-3x at night but not leaking as much using urge delay strategies.   Has been doing Kegel exercises and feeling she is better able to isolate PFM.       She was compliant with home exercise program.  Response to previous treatment: Initial eval  Functional change: Improving ability to control urge at night.     Pain: not an issue    Constitutional Symptoms Review: The patient denies having any constitutional symptoms.     Objective   Pt verbally consents to intravaginal treatment today.  Signed consent form already on file.     Therapeutic Exercise to develop  strength for 30 minutes including:   Glute sets 1x10 hold 3"   Seated Hip adduction ball squeeze 2x10 hold 5"  Seated alternating hip abd with RTB 1x10 ea  TA sets 1x10   Standing hip abduction 1x10 B at // bars (UE support)  Standing hip extension 1x10 B at // bars (UE support)  Standing band pulldown with TA contraction 2x10     Neuromuscular re-education activities to develop Coordination, Control, Down training, and Proprioception for 15 minutes including: diaphragmatic breathing and urge delay strategies   -TA sets in conjunction with Kegel for overflow " "strengthening     -NMRE with EMG biofeedback using external anal sensors. Pt with avg resting tone of 2.1 microvolts. Pt performed Kegel endurance holds x15 reps on 5"/15" W/R cycle. Muscle recruitment average 6.3mv (improved from 4.0mv).    Not performed:  -Kegel endurance holds and quick flicks seated on towel roll 1x10 each (pt to begin 2x/day at home)  -Educated on bladder frequency, bladder health, bladder irritants    Mamta received the following manual therapy techniques: to develop extensibility and desensitization for 0 minutes including:         Home Exercises Provided and Patient Education Provided     Education provided:   - general anatomy/physiology of urinary/ bowel  system and benefits of treatment were discussed with the pt. Additionally, anatomy/physiology of pelvic floor, bladder retraining, diaphragmatic breathing, and urge delay strategies were reviewed.   -TA co-contraction with Kegel  -EMG biofeedback  -HEP  Quick Flicks   Perfor, a fast kegel (contract and LIFT the pelvic floor muscles as if you're trying to stop the stream of urine and passage of gas).    Make sure you're just using the internal muscles, not holding for longer than 1 second without holding your breath.  Let go and relax everything for 3-5 seconds.   Repeat 10 times, 2 sets per day.     Endurance Holds  Perform a long kegel (contract and LIFT the pelvic floor muscles as if you're trying to stop the stream of urine and passage of gas).    Make sure you're just using the internal muscles without holding your breath.  Let go and relax everything for 10 seconds.   Hold 5 seconds. Repeat 10 times, 2 sets per day.     Discussed progression of plan of care with patient; educated pt in activity modification; reviewed HEP with pt. Pt demonstrated and verbalized understanding of all instruction and was provided with a handout of HEP (see Patient Instructions).    Written Home Exercises Provided: yes.  Exercises were reviewed and " Mamta was able to demonstrate them prior to the end of the session.  Mamta demonstrated good  understanding of the education provided.     See EMR under Patient Instructions for exercises provided 7/27/2023, 8/2/23, 8/10/23    Assessment     Mamta reports still getting up 2-3x at night to void and still leaks but less volume and sometimes able to make it without leaking.Pt encouraged to continue urge delay strategies with initial goal to be able to control UI when walking to bathroom at night with urge. EMG biofeedback with endurance Kegels performed - pt demo's increased mm recruitment v last session.   Continued overflow strengthening exercises - gave pt sitting options for hip abd/add ex as she has a hard time doing the in the bed. .     Continued previous exercises and added standing hip and TA strengthening. Pt given red thera-band for home use.   Mamta Is progressing well towards her goals.   Pt prognosis is Fair.     Pt will continue to benefit from skilled outpatient physical therapy to address the deficits listed in the problem list box on initial evaluation, provide pt/family education and to maximize pt's level of independence in the home and community environment.     Pt's spiritual, cultural and educational needs considered and pt agreeable to plan of care and goals.     Anticipated barriers to physical therapy: chronic LBP    Goals:  Short Term Goals: 5 weeks (ongoing)  Pt to be edu pelvic muscle bracing and be able to consistently perform correctly and quickly to help decrease incontinence with cough/laugh/sneeze.  Pt to report a decrease in pad usage to no more than 1 a day to demonstrate improving pelvic floor function needed for continence.  Pt to demonstrate being able to correctly and consistently perform a kegel which is needed  to increase pelvic floor muscle coordination and strength needed for continence.  Pt to be able to delay the urge to urinate at least 5 minutes with a strong  urge to urinate in order to make it to the bathroom without leaking.  Pt to report being able to transfer from sitting to standing and walk to the bathroom at night without leakage of urine.  Pt to voice understanding of the role that diet plays on urinary urgency. (Met)                Long Term Goals: 10 week  Pt to be discharged with home plan for carry over after discharge.    Pt to be able to perform a 5 second kegel x 10 reps to demonstrate improving strength and endurance needed for continence.  Pt to no longer need to wear a pad for protection due to reduction of urinary incontinence by at least 75% with ADLs.  Pt to report a decrease in nocturia getting up no more than 1x at night to allow for improved restorative sleep.  Pt to report elimination of incontinence with ADLs to demonstrate improved pelvic floor muscle strength and coordination  Pt to demonstrate an improved score in the FOTO Urinary Problem survey  to at least 35% limitation to demonstrate improving continence.    Pt to increase pelvic floor strength to at least 3/5 to demonstrate improved strength needed for continence with ADLs.     Plan     Plan of care Certification: 7/20/2023 to 9/29/2023.     Outpatient Physical Therapy 1 times weekly for 10 weeks to include the following interventions: therapeutic exercises, therapeutic activity, neuromuscular re-education, manual therapy, patient/family education, and self care/home management    Audrey Pabon, PT

## 2023-08-10 ENCOUNTER — CLINICAL SUPPORT (OUTPATIENT)
Dept: REHABILITATION | Facility: HOSPITAL | Age: 78
End: 2023-08-10
Payer: MEDICARE

## 2023-08-10 DIAGNOSIS — N81.89 PELVIC FLOOR WEAKNESS IN FEMALE: Primary | ICD-10-CM

## 2023-08-10 DIAGNOSIS — R35.1 NOCTURIA MORE THAN TWICE PER NIGHT: ICD-10-CM

## 2023-08-10 PROCEDURE — 97110 THERAPEUTIC EXERCISES: CPT | Mod: HCNC,PN

## 2023-08-10 PROCEDURE — 97112 NEUROMUSCULAR REEDUCATION: CPT | Mod: HCNC,PN

## 2023-08-11 DIAGNOSIS — R60.0 BILATERAL LOWER EXTREMITY EDEMA: ICD-10-CM

## 2023-08-11 RX ORDER — FUROSEMIDE 20 MG/1
TABLET ORAL
Qty: 90 TABLET | Refills: 3 | OUTPATIENT
Start: 2023-08-11

## 2023-08-11 NOTE — TELEPHONE ENCOUNTER
Refill Decision Note   Lanie Museмарина  is requesting a refill authorization.  Brief Assessment and Rationale for Refill:  Quick Discontinue     Medication Therapy Plan:  Dose adjustment 2/18/23      Pharmacist review requested: Yes   Comments:     Note composed:10:07 AM 08/11/2023

## 2023-08-11 NOTE — TELEPHONE ENCOUNTER
No care due was identified.  [facility] Embedded Care Due Messages. Reference number: [id number].   8/11/2023 12:11:32 AM CDT

## 2023-08-11 NOTE — TELEPHONE ENCOUNTER
Refill Routing Note   Medication(s) are not appropriate for processing by [facility] for the following reason(s):      Drug-drug interaction: furosemide, losartan-hydrochlorothiazide 50-12.5 mg, hydroCHLOROthiazide    [facility] action(s):  Defer Care Due:  None identified            Pharmacist review requested: Yes     Appointments  past 12m or future 3m with PCP    Date Provider   Last Visit   7/11/2023 Brett Garcia MD   Next Visit   Visit date not found Brett Garcia MD   ED visits in past 90 days: 0        Note composed:9:40 AM 08/11/2023

## 2023-08-17 ENCOUNTER — LAB VISIT (OUTPATIENT)
Dept: LAB | Facility: HOSPITAL | Age: 78
End: 2023-08-17
Attending: INTERNAL MEDICINE
Payer: MEDICARE

## 2023-08-17 ENCOUNTER — CLINICAL SUPPORT (OUTPATIENT)
Dept: REHABILITATION | Facility: HOSPITAL | Age: 78
End: 2023-08-17
Payer: MEDICARE

## 2023-08-17 DIAGNOSIS — N81.89 PELVIC FLOOR WEAKNESS IN FEMALE: Primary | ICD-10-CM

## 2023-08-17 DIAGNOSIS — I25.89 CORONARY ARTERITIS: Primary | ICD-10-CM

## 2023-08-17 DIAGNOSIS — R35.1 NOCTURIA MORE THAN TWICE PER NIGHT: ICD-10-CM

## 2023-08-17 LAB
ALBUMIN SERPL BCP-MCNC: 3.5 G/DL (ref 3.5–5.2)
ALP SERPL-CCNC: 85 U/L (ref 55–135)
ALT SERPL W/O P-5'-P-CCNC: 9 U/L (ref 10–44)
ANION GAP SERPL CALC-SCNC: 7 MMOL/L (ref 8–16)
AST SERPL-CCNC: 13 U/L (ref 10–40)
BASOPHILS # BLD AUTO: 0.07 K/UL (ref 0–0.2)
BASOPHILS NFR BLD: 1 % (ref 0–1.9)
BILIRUB SERPL-MCNC: 0.6 MG/DL (ref 0.1–1)
BUN SERPL-MCNC: 16 MG/DL (ref 8–23)
CALCIUM SERPL-MCNC: 9 MG/DL (ref 8.7–10.5)
CHLORIDE SERPL-SCNC: 109 MMOL/L (ref 95–110)
CHOLEST SERPL-MCNC: 210 MG/DL (ref 120–199)
CHOLEST/HDLC SERPL: 4.6 {RATIO} (ref 2–5)
CO2 SERPL-SCNC: 27 MMOL/L (ref 23–29)
CREAT SERPL-MCNC: 0.7 MG/DL (ref 0.5–1.4)
DIFFERENTIAL METHOD: ABNORMAL
EOSINOPHIL # BLD AUTO: 0.2 K/UL (ref 0–0.5)
EOSINOPHIL NFR BLD: 3 % (ref 0–8)
ERYTHROCYTE [DISTWIDTH] IN BLOOD BY AUTOMATED COUNT: 14.7 % (ref 11.5–14.5)
EST. GFR  (NO RACE VARIABLE): >60 ML/MIN/1.73 M^2
ESTIMATED AVG GLUCOSE: 103 MG/DL (ref 68–131)
GLUCOSE SERPL-MCNC: 91 MG/DL (ref 70–110)
HBA1C MFR BLD: 5.2 % (ref 4–5.6)
HCT VFR BLD AUTO: 33.9 % (ref 37–48.5)
HDLC SERPL-MCNC: 46 MG/DL (ref 40–75)
HDLC SERPL: 21.9 % (ref 20–50)
HGB BLD-MCNC: 11.1 G/DL (ref 12–16)
IMM GRANULOCYTES # BLD AUTO: 0.04 K/UL (ref 0–0.04)
IMM GRANULOCYTES NFR BLD AUTO: 0.5 % (ref 0–0.5)
LDLC SERPL CALC-MCNC: 148 MG/DL (ref 63–159)
LYMPHOCYTES # BLD AUTO: 1.4 K/UL (ref 1–4.8)
LYMPHOCYTES NFR BLD: 19.3 % (ref 18–48)
MCH RBC QN AUTO: 27.9 PG (ref 27–31)
MCHC RBC AUTO-ENTMCNC: 32.7 G/DL (ref 32–36)
MCV RBC AUTO: 85 FL (ref 82–98)
MONOCYTES # BLD AUTO: 0.7 K/UL (ref 0.3–1)
MONOCYTES NFR BLD: 9.3 % (ref 4–15)
NEUTROPHILS # BLD AUTO: 4.9 K/UL (ref 1.8–7.7)
NEUTROPHILS NFR BLD: 66.9 % (ref 38–73)
NONHDLC SERPL-MCNC: 164 MG/DL
NRBC BLD-RTO: 0 /100 WBC
PLATELET # BLD AUTO: 209 K/UL (ref 150–450)
PMV BLD AUTO: 10.7 FL (ref 9.2–12.9)
POTASSIUM SERPL-SCNC: 4 MMOL/L (ref 3.5–5.1)
PROT SERPL-MCNC: 6.8 G/DL (ref 6–8.4)
RBC # BLD AUTO: 3.98 M/UL (ref 4–5.4)
SODIUM SERPL-SCNC: 143 MMOL/L (ref 136–145)
TRIGL SERPL-MCNC: 80 MG/DL (ref 30–150)
TSH SERPL DL<=0.005 MIU/L-ACNC: 1.36 UIU/ML (ref 0.4–4)
WBC # BLD AUTO: 7.31 K/UL (ref 3.9–12.7)

## 2023-08-17 PROCEDURE — 85025 COMPLETE CBC W/AUTO DIFF WBC: CPT | Mod: HCNC | Performed by: INTERNAL MEDICINE

## 2023-08-17 PROCEDURE — 97112 NEUROMUSCULAR REEDUCATION: CPT | Mod: HCNC,PN

## 2023-08-17 PROCEDURE — 97110 THERAPEUTIC EXERCISES: CPT | Mod: HCNC,PN

## 2023-08-17 PROCEDURE — 84443 ASSAY THYROID STIM HORMONE: CPT | Mod: HCNC | Performed by: INTERNAL MEDICINE

## 2023-08-17 PROCEDURE — 80061 LIPID PANEL: CPT | Mod: HCNC | Performed by: INTERNAL MEDICINE

## 2023-08-17 PROCEDURE — 83036 HEMOGLOBIN GLYCOSYLATED A1C: CPT | Mod: HCNC | Performed by: INTERNAL MEDICINE

## 2023-08-17 PROCEDURE — 80053 COMPREHEN METABOLIC PANEL: CPT | Mod: HCNC | Performed by: INTERNAL MEDICINE

## 2023-08-17 PROCEDURE — 36415 COLL VENOUS BLD VENIPUNCTURE: CPT | Mod: HCNC,PO | Performed by: INTERNAL MEDICINE

## 2023-08-17 NOTE — PROGRESS NOTES
"  Pelvic Health Physical Therapy   Treatment Note     Name: Lanie Cavazos  Clinic Number: [id number]    Therapy Diagnosis:   Encounter Diagnoses   Name Primary?    Pelvic floor weakness in female Yes    Nocturia more than twice per night        Physician: Alice Tobin,*    Visit Date: 8/17/2023    Physician Orders: PT Eval and Treat   Medical Diagnosis from Referral:   N39.46 (ICD-10-CM) - Mixed stress and urge urinary incontinence  Evaluation Date: 7/20/2023  Authorization Period Expiration: 12/31/23  Plan of Care Expiration: 9/29/23  Progress Note Due: 8/20/23  Visit # / Visits authorized: 4/20 (+eval)     FOTO: Issued Visit # 1, 4     Time In: 8:32  Time Out: 9:16  Total Billable Time: 44 minutes    Precautions: Standard    Subjective     Pt reports: Has been working on urge delay. Still getting up 2-3x at night but pad is not very wet.    Has been doing Kegel exercises and feeling she is better able to isolate PFM.       She was compliant with home exercise program.  Response to previous treatment: Initial eval  Functional change: Improving ability to control urge at night.     Pain: not an issue    Constitutional Symptoms Review: The patient denies having any constitutional symptoms.     Objective   Pt verbally consents to intravaginal treatment today.  Signed consent form already on file.     Therapeutic Exercise to develop  strength for 29 minutes including:   Glute sets 1x10 hold 3"   Seated Hip adduction ball squeeze 2x10 hold 5"  Seated alternating hip abd with RTB 2x10 ea  TA sets 1x10   Standing hip abduction 2x10 B at // bars (UE support)  Standing hip extension 2x10 B at // bars (UE support)  Standing band pulldown with blue tubing and TA contraction 2x10  +Standing row with blue tubing and TA contraction 2x10  +Sit to stand x10 with exhale on stand       Neuromuscular re-education activities to develop Coordination, Control, Down training, and Proprioception for 15 minutes including: " "diaphragmatic breathing and urge delay strategies   -Kegels - endurance holds 5" x10 and quick flicks x10  -TA sets in conjunction with Kegel for overflow strengthening   +Tandem stand 30" B  +Alternating step tap on 4" step 30"  +Weight shift, toe raises and heel raises on compliant foam  **pt requires hand support on bar for step taps and heel/toe raises    -NMRE with EMG biofeedback using external anal sensors. Pt with avg resting tone of 2.1 microvolts. Pt performed Kegel endurance holds x15 reps on 5"/15" W/R cycle. Muscle recruitment average 6.3mv (improved from 4.0mv).    Not performed:  -Kegel endurance holds and quick flicks seated on towel roll 1x10 each (pt to begin 2x/day at home)  -Educated on bladder frequency, bladder health, bladder irritants    Mamta received the following manual therapy techniques: to develop extensibility and desensitization for 0 minutes including:         Home Exercises Provided and Patient Education Provided     Education provided:   - general anatomy/physiology of urinary/ bowel  system and benefits of treatment were discussed with the pt. Additionally, anatomy/physiology of pelvic floor, bladder retraining, diaphragmatic breathing, and urge delay strategies were reviewed.   -TA co-contraction with Kegel  -EMG biofeedback  -HEP  -Blow as you go  Quick Flicks   Perfor, a fast kegel (contract and LIFT the pelvic floor muscles as if you're trying to stop the stream of urine and passage of gas).    Make sure you're just using the internal muscles, not holding for longer than 1 second without holding your breath.  Let go and relax everything for 3-5 seconds.   Repeat 10 times, 2 sets per day.     Endurance Holds  Perform a long kegel (contract and LIFT the pelvic floor muscles as if you're trying to stop the stream of urine and passage of gas).    Make sure you're just using the internal muscles without holding your breath.  Let go and relax everything for 10 seconds.   Hold 5 " seconds. Repeat 10 times, 2 sets per day.     Discussed progression of plan of care with patient; educated pt in activity modification; reviewed HEP with pt. Pt demonstrated and verbalized understanding of all instruction and was provided with a handout of HEP (see Patient Instructions).    Written Home Exercises Provided: yes.  Exercises were reviewed and Mamta was able to demonstrate them prior to the end of the session.  Mamta demonstrated good  understanding of the education provided.     See EMR under Patient Instructions for exercises provided 7/27/2023, 8/2/23, 8/10/23, 8/17/23    Assessment     Mamta reports still getting up 2-3x at night to void but only damp pad in am.Pt encouraged to continue urge delay strategies with initial goal to be able to control UI when walking to bathroom at night with urge. Continued overflow strengthening exercises - also added balance activities for PF overflow.   Reviewed blow as you go when coming to stand and picking up objects.      Continued previous exercises and added standing hip and TA strengthening. Pt given red thera-band for home use.   Mamat Is progressing well towards her goals.   Pt prognosis is Fair.     Pt will continue to benefit from skilled outpatient physical therapy to address the deficits listed in the problem list box on initial evaluation, provide pt/family education and to maximize pt's level of independence in the home and community environment.     Pt's spiritual, cultural and educational needs considered and pt agreeable to plan of care and goals.     Anticipated barriers to physical therapy: chronic LBP    Goals:  Short Term Goals: 5 weeks (ongoing)  Pt to be edu pelvic muscle bracing and be able to consistently perform correctly and quickly to help decrease incontinence with cough/laugh/sneeze.  Pt to report a decrease in pad usage to no more than 1 a day to demonstrate improving pelvic floor function needed for continence.  Pt to  demonstrate being able to correctly and consistently perform a kegel which is needed  to increase pelvic floor muscle coordination and strength needed for continence.  Pt to be able to delay the urge to urinate at least 5 minutes with a strong urge to urinate in order to make it to the bathroom without leaking.  Pt to report being able to transfer from sitting to standing and walk to the bathroom at night without leakage of urine.  Pt to voice understanding of the role that diet plays on urinary urgency. (Met)                Long Term Goals: 10 week  Pt to be discharged with home plan for carry over after discharge.    Pt to be able to perform a 5 second kegel x 10 reps to demonstrate improving strength and endurance needed for continence.  Pt to no longer need to wear a pad for protection due to reduction of urinary incontinence by at least 75% with ADLs.  Pt to report a decrease in nocturia getting up no more than 1x at night to allow for improved restorative sleep.  Pt to report elimination of incontinence with ADLs to demonstrate improved pelvic floor muscle strength and coordination  Pt to demonstrate an improved score in the FOTO Urinary Problem survey  to at least 35% limitation to demonstrate improving continence.    Pt to increase pelvic floor strength to at least 3/5 to demonstrate improved strength needed for continence with ADLs.     Plan     Plan of care Certification: 7/20/2023 to 9/29/2023.     Outpatient Physical Therapy 1 times weekly for 10 weeks to include the following interventions: therapeutic exercises, therapeutic activity, neuromuscular re-education, manual therapy, patient/family education, and self care/home management    Audrey Pabon, PT

## 2023-08-23 ENCOUNTER — TELEPHONE (OUTPATIENT)
Dept: OBSTETRICS AND GYNECOLOGY | Facility: CLINIC | Age: 78
End: 2023-08-23
Payer: MEDICARE

## 2023-08-23 NOTE — TELEPHONE ENCOUNTER
----- Message from Marycarmen Mccurdy sent at 8/23/2023  8:23 AM CDT -----  Regarding: cancellation  Contact: patient at [phone]  Type: cancellation  Who Called:  patient at [phone]    Additional Information: Patient needed to cancel today's appt because she wasn't feeling well. She rescheduled to 9/25. She wanted to let doctor know.

## 2023-09-21 DIAGNOSIS — I10 BENIGN ESSENTIAL HTN: ICD-10-CM

## 2023-09-21 DIAGNOSIS — R79.89 PRERENAL AZOTEMIA: ICD-10-CM

## 2023-09-21 RX ORDER — AMLODIPINE BESYLATE 2.5 MG/1
TABLET ORAL
Qty: 90 TABLET | Refills: 0 | Status: SHIPPED | OUTPATIENT
Start: 2023-09-21

## 2023-09-21 NOTE — TELEPHONE ENCOUNTER
One-time refill request approved - patient will need to schedule clinic appointment prior to additional medication refills.     Marycarmen Loja MD  [facility]  Office: [phone]   Fax: [phone]  09/21/2023

## 2023-09-28 ENCOUNTER — LAB VISIT (OUTPATIENT)
Dept: LAB | Facility: HOSPITAL | Age: 78
End: 2023-09-28
Attending: INTERNAL MEDICINE
Payer: MEDICARE

## 2023-09-28 ENCOUNTER — DOCUMENTATION ONLY (OUTPATIENT)
Dept: REHABILITATION | Facility: HOSPITAL | Age: 78
End: 2023-09-28
Payer: MEDICARE

## 2023-09-28 DIAGNOSIS — E66.01 MORBID OBESITY: ICD-10-CM

## 2023-09-28 DIAGNOSIS — Z78.9 STATIN INTOLERANCE: ICD-10-CM

## 2023-09-28 DIAGNOSIS — I25.10 CORONARY ARTERY DISEASE INVOLVING NATIVE CORONARY ARTERY OF NATIVE HEART WITHOUT ANGINA PECTORIS: ICD-10-CM

## 2023-09-28 DIAGNOSIS — R73.02 IMPAIRED GLUCOSE TOLERANCE: ICD-10-CM

## 2023-09-28 DIAGNOSIS — E78.49 OTHER HYPERLIPIDEMIA: ICD-10-CM

## 2023-09-28 DIAGNOSIS — I10 PRIMARY HYPERTENSION: ICD-10-CM

## 2023-09-28 DIAGNOSIS — R60.0 BILATERAL LOWER EXTREMITY EDEMA: ICD-10-CM

## 2023-09-28 PROBLEM — N81.89 PELVIC FLOOR WEAKNESS IN FEMALE: Status: RESOLVED | Noted: 2023-07-20 | Resolved: 2023-09-28

## 2023-09-28 PROBLEM — R35.1 NOCTURIA MORE THAN TWICE PER NIGHT: Status: RESOLVED | Noted: 2023-07-20 | Resolved: 2023-09-28

## 2023-09-28 LAB
ALBUMIN SERPL BCP-MCNC: 3.6 G/DL (ref 3.5–5.2)
ALP SERPL-CCNC: 82 U/L (ref 55–135)
ALT SERPL W/O P-5'-P-CCNC: 9 U/L (ref 10–44)
ANION GAP SERPL CALC-SCNC: 8 MMOL/L (ref 8–16)
AST SERPL-CCNC: 13 U/L (ref 10–40)
BASOPHILS # BLD AUTO: 0.04 K/UL (ref 0–0.2)
BASOPHILS NFR BLD: 0.5 % (ref 0–1.9)
BILIRUB SERPL-MCNC: 0.5 MG/DL (ref 0.1–1)
BUN SERPL-MCNC: 19 MG/DL (ref 8–23)
CALCIUM SERPL-MCNC: 9.2 MG/DL (ref 8.7–10.5)
CHLORIDE SERPL-SCNC: 111 MMOL/L (ref 95–110)
CHOLEST SERPL-MCNC: 212 MG/DL (ref 120–199)
CHOLEST/HDLC SERPL: 5 {RATIO} (ref 2–5)
CO2 SERPL-SCNC: 23 MMOL/L (ref 23–29)
CREAT SERPL-MCNC: 0.7 MG/DL (ref 0.5–1.4)
DIFFERENTIAL METHOD: ABNORMAL
EOSINOPHIL # BLD AUTO: 0.2 K/UL (ref 0–0.5)
EOSINOPHIL NFR BLD: 2.5 % (ref 0–8)
ERYTHROCYTE [DISTWIDTH] IN BLOOD BY AUTOMATED COUNT: 14.1 % (ref 11.5–14.5)
EST. GFR  (NO RACE VARIABLE): >60 ML/MIN/1.73 M^2
GLUCOSE SERPL-MCNC: 98 MG/DL (ref 70–110)
HCT VFR BLD AUTO: 35.4 % (ref 37–48.5)
HDLC SERPL-MCNC: 42 MG/DL (ref 40–75)
HDLC SERPL: 19.8 % (ref 20–50)
HGB BLD-MCNC: 12.1 G/DL (ref 12–16)
IMM GRANULOCYTES # BLD AUTO: 0.06 K/UL (ref 0–0.04)
IMM GRANULOCYTES NFR BLD AUTO: 0.8 % (ref 0–0.5)
LDLC SERPL CALC-MCNC: 153.2 MG/DL (ref 63–159)
LYMPHOCYTES # BLD AUTO: 1.4 K/UL (ref 1–4.8)
LYMPHOCYTES NFR BLD: 17.9 % (ref 18–48)
MCH RBC QN AUTO: 27.8 PG (ref 27–31)
MCHC RBC AUTO-ENTMCNC: 34.2 G/DL (ref 32–36)
MCV RBC AUTO: 81 FL (ref 82–98)
MONOCYTES # BLD AUTO: 0.6 K/UL (ref 0.3–1)
MONOCYTES NFR BLD: 8.1 % (ref 4–15)
NEUTROPHILS # BLD AUTO: 5.5 K/UL (ref 1.8–7.7)
NEUTROPHILS NFR BLD: 70.2 % (ref 38–73)
NONHDLC SERPL-MCNC: 170 MG/DL
NRBC BLD-RTO: 0 /100 WBC
PLATELET # BLD AUTO: 236 K/UL (ref 150–450)
PMV BLD AUTO: 10.6 FL (ref 9.2–12.9)
POTASSIUM SERPL-SCNC: 4.4 MMOL/L (ref 3.5–5.1)
PROT SERPL-MCNC: 6.8 G/DL (ref 6–8.4)
RBC # BLD AUTO: 4.36 M/UL (ref 4–5.4)
SODIUM SERPL-SCNC: 142 MMOL/L (ref 136–145)
T4 FREE SERPL-MCNC: 0.9 NG/DL (ref 0.71–1.51)
TRIGL SERPL-MCNC: 84 MG/DL (ref 30–150)
TSH SERPL DL<=0.005 MIU/L-ACNC: 1.45 UIU/ML (ref 0.4–4)
WBC # BLD AUTO: 7.87 K/UL (ref 3.9–12.7)

## 2023-09-28 PROCEDURE — 36415 COLL VENOUS BLD VENIPUNCTURE: CPT | Mod: HCNC,PO | Performed by: INTERNAL MEDICINE

## 2023-09-28 PROCEDURE — 84439 ASSAY OF FREE THYROXINE: CPT | Mod: HCNC | Performed by: INTERNAL MEDICINE

## 2023-09-28 PROCEDURE — 80061 LIPID PANEL: CPT | Mod: HCNC | Performed by: INTERNAL MEDICINE

## 2023-09-28 PROCEDURE — 80053 COMPREHEN METABOLIC PANEL: CPT | Mod: HCNC | Performed by: INTERNAL MEDICINE

## 2023-09-28 PROCEDURE — 84443 ASSAY THYROID STIM HORMONE: CPT | Mod: HCNC | Performed by: INTERNAL MEDICINE

## 2023-09-28 PROCEDURE — 85025 COMPLETE CBC W/AUTO DIFF WBC: CPT | Mod: HCNC | Performed by: INTERNAL MEDICINE

## 2023-09-28 NOTE — PROGRESS NOTES
PHYSICAL THERAPY DISCHARGE:    This patient was originally evaluated at our facility on: 7/20/23         Pt attended PT for a total of 5 Visits receiving: Manual Therapy, Therex, Postural Education, Body Mechanics Training, Home Exercise Instruction     This patient's last visit occurred on: 8/17/23      Short term goals were achieved: 1    Long term goals were achieved: 0    Pt was DC'd from our care secondary to: Pt cancelled all appts due to other medical issues.       Therapist: Audrey Pabon, PT  9/28/2023

## 2023-10-04 DIAGNOSIS — R79.89 PRERENAL AZOTEMIA: ICD-10-CM

## 2023-10-04 DIAGNOSIS — I10 BENIGN ESSENTIAL HTN: ICD-10-CM

## 2023-10-04 RX ORDER — HYDROCHLOROTHIAZIDE 12.5 MG/1
TABLET ORAL
Qty: 16 TABLET | Refills: 0 | Status: SHIPPED | OUTPATIENT
Start: 2023-10-04

## 2023-10-04 NOTE — TELEPHONE ENCOUNTER
Please see the attached refill request.    Please consider one refill until we can get patient scheduled?

## 2023-10-05 ENCOUNTER — TELEPHONE (OUTPATIENT)
Dept: FAMILY MEDICINE | Facility: CLINIC | Age: 78
End: 2023-10-05
Payer: MEDICARE

## 2023-10-05 PROBLEM — I63.411 CEREBROVASCULAR ACCIDENT (CVA) DUE TO EMBOLISM OF RIGHT MIDDLE CEREBRAL ARTERY: Status: ACTIVE | Noted: 2023-10-05

## 2023-10-05 PROBLEM — G45.9 TIA (TRANSIENT ISCHEMIC ATTACK): Status: ACTIVE | Noted: 2023-10-05

## 2023-10-05 NOTE — TELEPHONE ENCOUNTER
----- Message from Eli Bermudez sent at 10/5/2023  3:26 PM CDT -----  Contact: pt  Type:  Sooner Apoointment Request    Caller is requesting a sooner appointment.  Caller declined first available appointment listed below.  Caller will not accept being placed on the waitlist and is requesting a message be sent to doctor.  Name of Caller:pt and [facility]  When is the first available appointment???  Symptoms:Hosp f/u 1 week  Would the patient rather a call back or a response via [facility]? call  Best Call Back Number:[phone]  Additional Information: States that they need a callback as soon as possible. States that the pt need to schedule 1 week f/u.  Pt discharging 10/5 from [facility]. Please advise thank you

## 2023-10-06 DIAGNOSIS — I10 BENIGN ESSENTIAL HTN: ICD-10-CM

## 2023-10-06 DIAGNOSIS — R79.89 PRERENAL AZOTEMIA: ICD-10-CM

## 2023-10-06 RX ORDER — HYDROCHLOROTHIAZIDE 12.5 MG/1
TABLET ORAL
Qty: 48 TABLET | Refills: 1 | OUTPATIENT
Start: 2023-10-06

## 2023-10-06 NOTE — TELEPHONE ENCOUNTER
Refill Decision Note   Lanie Museмарина  is requesting a refill authorization.  Brief Assessment and Rationale for Refill:  Quick Discontinue     Medication Therapy Plan:         Comments:     Note composed:1:41 PM 10/06/2023

## 2023-10-07 ENCOUNTER — TELEPHONE (OUTPATIENT)
Dept: FAMILY MEDICINE | Facility: CLINIC | Age: 78
End: 2023-10-07
Payer: MEDICARE

## 2023-10-07 NOTE — TELEPHONE ENCOUNTER
----- Message from Syl Sotomayor sent at 10/6/2023  2:00 PM CDT -----  Regarding: hospital f/u  Contact: maryana Cavazos  Type:  Sooner Appointment Request    Caller is requesting a sooner appointment.  Caller declined first available appointment listed below.  Caller will not accept being placed on the waitlist and is requesting a message be sent to doctor.    Name of Caller:  maryana Cavazos  When is the first available appointment?    Symptoms:  discharged 10/05/23 [facility] mini stroke  Best Call Back Number:  [phone] (home)   Additional Information:  Patient needs to be seen in a week. Patient needs a new primary doctor also. Please call patient's  to advise.Thanks!

## 2023-10-18 ENCOUNTER — OFFICE VISIT (OUTPATIENT)
Dept: OBSTETRICS AND GYNECOLOGY | Facility: CLINIC | Age: 78
End: 2023-10-18
Payer: MEDICARE

## 2023-10-18 VITALS
HEIGHT: 65 IN | DIASTOLIC BLOOD PRESSURE: 70 MMHG | WEIGHT: 249.56 LBS | SYSTOLIC BLOOD PRESSURE: 122 MMHG | BODY MASS INDEX: 41.58 KG/M2

## 2023-10-18 DIAGNOSIS — N39.46 MIXED INCONTINENCE: ICD-10-CM

## 2023-10-18 DIAGNOSIS — L90.0 LICHEN SCLEROSUS ET ATROPHICUS: ICD-10-CM

## 2023-10-18 DIAGNOSIS — R15.1 FECAL SMEARING: ICD-10-CM

## 2023-10-18 DIAGNOSIS — N76.0 VAGINITIS AND VULVOVAGINITIS: Primary | ICD-10-CM

## 2023-10-18 PROCEDURE — 3288F PR FALLS RISK ASSESSMENT DOCUMENTED: ICD-10-PCS | Mod: HCNC,CPTII,S$GLB, | Performed by: OBSTETRICS & GYNECOLOGY

## 2023-10-18 PROCEDURE — 99214 OFFICE O/P EST MOD 30 MIN: CPT | Mod: HCNC,S$GLB,, | Performed by: OBSTETRICS & GYNECOLOGY

## 2023-10-18 PROCEDURE — 3078F DIAST BP <80 MM HG: CPT | Mod: HCNC,CPTII,S$GLB, | Performed by: OBSTETRICS & GYNECOLOGY

## 2023-10-18 PROCEDURE — 99999 PR PBB SHADOW E&M-EST. PATIENT-LVL IV: CPT | Mod: PBBFAC,HCNC,, | Performed by: OBSTETRICS & GYNECOLOGY

## 2023-10-18 PROCEDURE — 1101F PT FALLS ASSESS-DOCD LE1/YR: CPT | Mod: HCNC,CPTII,S$GLB, | Performed by: OBSTETRICS & GYNECOLOGY

## 2023-10-18 PROCEDURE — 3074F PR MOST RECENT SYSTOLIC BLOOD PRESSURE < 130 MM HG: ICD-10-PCS | Mod: HCNC,CPTII,S$GLB, | Performed by: OBSTETRICS & GYNECOLOGY

## 2023-10-18 PROCEDURE — 99999 PR PBB SHADOW E&M-EST. PATIENT-LVL IV: ICD-10-PCS | Mod: PBBFAC,HCNC,, | Performed by: OBSTETRICS & GYNECOLOGY

## 2023-10-18 PROCEDURE — 1101F PR PT FALLS ASSESS DOC 0-1 FALLS W/OUT INJ PAST YR: ICD-10-PCS | Mod: HCNC,CPTII,S$GLB, | Performed by: OBSTETRICS & GYNECOLOGY

## 2023-10-18 PROCEDURE — 1159F PR MEDICATION LIST DOCUMENTED IN MEDICAL RECORD: ICD-10-PCS | Mod: HCNC,CPTII,S$GLB, | Performed by: OBSTETRICS & GYNECOLOGY

## 2023-10-18 PROCEDURE — 1159F MED LIST DOCD IN RCRD: CPT | Mod: HCNC,CPTII,S$GLB, | Performed by: OBSTETRICS & GYNECOLOGY

## 2023-10-18 PROCEDURE — 81514 NFCT DS BV&VAGINITIS DNA ALG: CPT | Mod: HCNC | Performed by: OBSTETRICS & GYNECOLOGY

## 2023-10-18 PROCEDURE — 3288F FALL RISK ASSESSMENT DOCD: CPT | Mod: HCNC,CPTII,S$GLB, | Performed by: OBSTETRICS & GYNECOLOGY

## 2023-10-18 PROCEDURE — 3074F SYST BP LT 130 MM HG: CPT | Mod: HCNC,CPTII,S$GLB, | Performed by: OBSTETRICS & GYNECOLOGY

## 2023-10-18 PROCEDURE — 99214 PR OFFICE/OUTPT VISIT, EST, LEVL IV, 30-39 MIN: ICD-10-PCS | Mod: HCNC,S$GLB,, | Performed by: OBSTETRICS & GYNECOLOGY

## 2023-10-18 PROCEDURE — 3078F PR MOST RECENT DIASTOLIC BLOOD PRESSURE < 80 MM HG: ICD-10-PCS | Mod: HCNC,CPTII,S$GLB, | Performed by: OBSTETRICS & GYNECOLOGY

## 2023-10-18 RX ORDER — CLOBETASOL PROPIONATE 0.5 MG/G
CREAM TOPICAL NIGHTLY
Qty: 45 G | Refills: 1 | Status: SHIPPED | OUTPATIENT
Start: 2023-10-18

## 2023-10-18 NOTE — PROGRESS NOTES
Chief Complaint   Patient presents with    Vaginal Rash       History of Present Illness: Lanie Cavazos is a 78 y.o. female that presents today 10/18/2023 for   Chief Complaint   Patient presents with    Vaginal Rash         Past Medical History:   Diagnosis Date    Carotid artery stenosis     Cerebrovascular accident (CVA) due to embolism of right middle cerebral artery 10/5/2023    Coronary artery disease     Hyperlipidemia     Hypertension     Obesity     Paroxysmal supraventricular tachycardia        Past Surgical History:   Procedure Laterality Date    APPENDECTOMY      BREAST BIOPSY Left     negative  needle asp  by Dr Olmedo    CARPAL TUNNEL RELEASE      CATARACT EXTRACTION BILATERAL W/ ANTERIOR VITRECTOMY      COLONOSCOPY N/A 12/18/2015    Procedure: COLONOSCOPY;  Surgeon: Timothy Syed Jr., MD;  [facility];  Service: Endoscopy;  Laterality: N/A;repeatb in 3 yrs. Benign polyps w possible adenomatous change in transverse colon polyp..    EYE SURGERY      HAND SURGERY  01/01/2001    HYSTERECTOMY  1979    without BSO.    toenail extraction Right 2016    4th toe.    TOTAL KNEE ARTHROPLASTY Left 2022       Outpatient Medications Prior to Visit   Medication Sig Dispense Refill    amLODIPine (NORVASC) 2.5 MG tablet TAKE 1 TABLET BY MOUTH EVERY DAY (Patient taking differently: Take 2.5 mg by mouth once daily.) 90 tablet 0    ascorbic acid, vitamin C, (VITAMIN C) 500 MG tablet Take 500 mg by mouth once daily.      aspirin (ECOTRIN) 81 MG EC tablet Take 1 tablet (81 mg total) by mouth once daily.  0    calcium-vitamin D 500-125 mg-unit tablet Take 1 tablet by mouth once daily.      clopidogreL (PLAVIX) 75 mg tablet Take 1 tablet (75 mg total) by mouth once daily. for 21 days 21 tablet 0    ferrous gluconate (FERGON) 324 MG tablet TAKE 1 TABLET (324 MG TOTAL) BY MOUTH DAILY WITH BREAKFAST. TAKE WITH VIT C 500 MG GENERIC 90 tablet 1    multivitamin (THERAGRAN) per tablet Take 1 tablet by mouth once daily.       mv-min-FA-D3-om3-dha-epa-fish 200 mcg-500 unit-200 mg Cap Take 1 tablet by mouth once daily.       potassium chloride (MICRO-K) 10 MEQ CpSR Take 1 capsule (10 mEq total) by mouth once daily. 90 capsule 1    UBIDECARENONE (COENZYME Q10) 100 mg Tab Take 100 mg by mouth once daily.      vitamin E 400 UNIT capsule Take 400 Units by mouth once daily.      VITAMIN E/FLAXSEED OIL (FLAXSEED OIL-VITAMIN E) 1,000-1 mg-unit Cap Take 1,000 mg by mouth once daily.      clobetasoL (TEMOVATE) 0.05 % cream Apply topically 2 (two) times daily. 45 g 1    acetaminophen (TYLENOL) 500 MG tablet Take 500 mg by mouth 3 (three) times daily as needed for Pain.      bempedoic acid (NEXLETOL) 180 mg Tab Take 1 tablet (180 mg total) by mouth Daily. (Patient not taking: Reported on 10/18/2023) 30 tablet 2    cholestyramine (QUESTRAN) 4 gram packet Take 1 packet (4 g total) by mouth 2 (two) times daily. Mix in 8 oz of fluid 2x a day (Patient not taking: Reported on 10/18/2023) 180 packet 3    ezetimibe (ZETIA) 10 mg tablet Take 1 tablet (10 mg total) by mouth every evening. Generic please. (Patient not taking: Reported on 10/18/2023) 90 tablet 3    furosemide (LASIX) 20 MG tablet TAKE 1 TABLET BY MOUTH 2 (TWO) TIMES DAILY. (Patient not taking: Reported on 10/18/2023) 180 tablet 1    hydroCHLOROthiazide (HYDRODIURIL) 12.5 MG Tab TAKE 1 TABLET BY MOUTH EVERY TUESDAY, THURSDAY, SATURDAY, AND SUNDAY. ((Keep appt w Dr Walton)) (Patient not taking: Reported on 10/18/2023) 16 tablet 0    losartan (COZAAR) 50 MG tablet Take 50 mg by mouth once daily.      metoprolol succinate (TOPROL-XL) 25 MG 24 hr tablet TAKE 1 TABLET BY MOUTH EVERY DAY (Patient not taking: Reported on 10/18/2023) 90 tablet 3    mirabegron (MYRBETRIQ) 25 mg Tb24 ER tablet Take 1 tablet (25 mg total) by mouth once daily. (Patient not taking: Reported on 10/18/2023) 30 tablet 11    triamcinolone (NASACORT) 55 mcg nasal inhaler 2 sprays by Nasal route once daily.      aspirin tablet 325  "mg       clopidogreL tablet 300 mg        No facility-administered medications prior to visit.       Review of patient's allergies indicates:   Allergen Reactions    Crestor [rosuvastatin]      Muscle ache    Lipitor [atorvastatin]      Muscle ache    Pitavastatin      Stopped due to joint pain    Pravachol [pravastatin]      Muscle ache    Sulfa (sulfonamide antibiotics) Swelling    Welchol [colesevelam]      Muscle ache    Zocor [simvastatin]      Muscle ache    Influenza virus vaccines Swelling and Rash      flu vaccination       Family History   Problem Relation Age of Onset    Cancer Mother     Cancer Father     COPD Son     Ovarian cancer Neg Hx     Breast cancer Neg Hx        Social History     Tobacco Use    Smoking status: Never    Smokeless tobacco: Never   Substance Use Topics    Alcohol use: No     Comment: rarely.    Drug use: No       OB History    Para Term  AB Living   4 4 4         SAB IAB Ectopic Multiple Live Births                  # Outcome Date GA Lbr Yariel/2nd Weight Sex Delivery Anes PTL Lv   4 Term            3 Term            2 Term            1 Term                    /70   Ht 5' 5" (1.651 m)   Wt 113.2 kg (249 lb 9 oz)   Physical Exam:  APPEARANCE: Well nourished, well developed, in no acute distress.  SKIN: Normal skin turgor, no lesions.  NECK: Neck symmetric without masses ABDOMEN: Soft. No tenderness or masses. No hepatosplenomegaly. No hernias.  PELVIC: Normal external female genitalia without lesions. Normal hair distribution. Adequate perineal body, normal urethral meatus. Urethra with no masses.  Bladder nontender. Vagina moist and well rugated without lesions or discharge.  No significant cystocele or rectocele. Adnexa without masses or tenderness. Urethra and bladder normal.  Perineum with gluteal fold erythema Anus with diffuse fecal matter   EXTREMITIES: No clubbing cyanosis or edema.    ASSESSMENT/PLAN:  Vaginitis and vulvovaginitis  -     Vaginosis " Screen by DNA Probe    Lichen sclerosus et atrophicus  -     clobetasoL (TEMOVATE) 0.05 % cream; Apply topically every evening.  Dispense: 45 g; Refill: 1    Mixed incontinence    Fecal smearing          30 minutes spent today spent preparing reviewing previous external notes, reviewing previous results, performing medical examination, ordering tests or medications, counseling and documenting.

## 2023-10-20 LAB
BACTERIAL VAGINOSIS DNA: POSITIVE
CANDIDA GLABRATA DNA: NEGATIVE
CANDIDA KRUSEI DNA: NEGATIVE
CANDIDA RRNA VAG QL PROBE: NEGATIVE
T VAGINALIS RRNA GENITAL QL PROBE: NEGATIVE

## 2023-10-23 ENCOUNTER — TELEPHONE (OUTPATIENT)
Dept: OBSTETRICS AND GYNECOLOGY | Facility: CLINIC | Age: 78
End: 2023-10-23
Payer: MEDICARE

## 2023-10-23 RX ORDER — TINIDAZOLE 500 MG/1
2 TABLET ORAL DAILY
Qty: 8 TABLET | Refills: 0 | Status: SHIPPED | OUTPATIENT
Start: 2023-10-23

## 2024-01-08 PROBLEM — I63.411 CEREBROVASCULAR ACCIDENT (CVA) DUE TO EMBOLISM OF RIGHT MIDDLE CEREBRAL ARTERY: Status: RESOLVED | Noted: 2023-10-05 | Resolved: 2024-01-08

## 2024-01-08 PROBLEM — G45.9 TIA (TRANSIENT ISCHEMIC ATTACK): Status: RESOLVED | Noted: 2023-10-05 | Resolved: 2024-01-08

## 2024-02-06 ENCOUNTER — HOSPITAL ENCOUNTER (OUTPATIENT)
Dept: RADIOLOGY | Facility: HOSPITAL | Age: 79
Discharge: HOME | End: 2024-02-06
Attending: INTERNAL MEDICINE
Payer: COMMERCIAL

## 2024-02-06 DIAGNOSIS — M25.551 RIGHT HIP PAIN: ICD-10-CM

## 2024-02-06 DIAGNOSIS — Z12.31 ENCOUNTER FOR SCREENING MAMMOGRAM FOR MALIGNANT NEOPLASM OF BREAST: ICD-10-CM

## 2024-02-06 DIAGNOSIS — Z78.0 POSTMENOPAUSAL: ICD-10-CM

## 2024-02-06 PROCEDURE — 77067 SCR MAMMO BI INCL CAD: CPT | Mod: TC,PO

## 2024-02-06 PROCEDURE — 77080 DXA BONE DENSITY AXIAL: CPT | Mod: 26,,, | Performed by: RADIOLOGY

## 2024-02-06 PROCEDURE — 77080 DXA BONE DENSITY AXIAL: CPT | Mod: TC,PO

## 2024-02-06 PROCEDURE — 73502 X-RAY EXAM HIP UNI 2-3 VIEWS: CPT | Mod: TC,FY,PO,RT

## 2024-02-06 PROCEDURE — 77067 SCR MAMMO BI INCL CAD: CPT | Mod: 26,,, | Performed by: RADIOLOGY

## 2024-02-06 PROCEDURE — 77063 BREAST TOMOSYNTHESIS BI: CPT | Mod: 26,,, | Performed by: RADIOLOGY

## 2024-02-06 PROCEDURE — 73502 X-RAY EXAM HIP UNI 2-3 VIEWS: CPT | Mod: 26,RT,, | Performed by: RADIOLOGY

## 2024-06-07 ENCOUNTER — LAB VISIT (OUTPATIENT)
Dept: LAB | Facility: HOSPITAL | Age: 79
End: 2024-06-07
Attending: INTERNAL MEDICINE
Payer: COMMERCIAL

## 2024-06-07 DIAGNOSIS — E78.00 HYPERCHOLESTEROLEMIA: ICD-10-CM

## 2024-06-07 DIAGNOSIS — M85.80 OSTEOPENIA, UNSPECIFIED LOCATION: ICD-10-CM

## 2024-06-07 DIAGNOSIS — E78.00 HYPERCHOLESTEROLEMIA: Primary | ICD-10-CM

## 2024-06-07 DIAGNOSIS — D64.9 ANEMIA, UNSPECIFIED: ICD-10-CM

## 2024-06-07 LAB
ALBUMIN SERPL BCP-MCNC: 3.7 G/DL (ref 3.5–5.2)
ALP SERPL-CCNC: 63 U/L (ref 55–135)
ALT SERPL W/O P-5'-P-CCNC: 8 U/L (ref 10–44)
ANION GAP SERPL CALC-SCNC: 7 MMOL/L (ref 8–16)
AST SERPL-CCNC: 15 U/L (ref 10–40)
BASOPHILS # BLD AUTO: 0.05 K/UL (ref 0–0.2)
BASOPHILS NFR BLD: 0.8 % (ref 0–1.9)
BILIRUB SERPL-MCNC: 0.7 MG/DL (ref 0.1–1)
BUN SERPL-MCNC: 21 MG/DL (ref 8–23)
CALCIUM SERPL-MCNC: 9.6 MG/DL (ref 8.7–10.5)
CHLORIDE SERPL-SCNC: 110 MMOL/L (ref 95–110)
CHOLEST SERPL-MCNC: 99 MG/DL (ref 120–199)
CHOLEST/HDLC SERPL: 2.7 {RATIO} (ref 2–5)
CO2 SERPL-SCNC: 24 MMOL/L (ref 23–29)
CREAT SERPL-MCNC: 0.8 MG/DL (ref 0.5–1.4)
DIFFERENTIAL METHOD BLD: ABNORMAL
EOSINOPHIL # BLD AUTO: 0.2 K/UL (ref 0–0.5)
EOSINOPHIL NFR BLD: 2.5 % (ref 0–8)
ERYTHROCYTE [DISTWIDTH] IN BLOOD BY AUTOMATED COUNT: 13.6 % (ref 11.5–14.5)
EST. GFR  (NO RACE VARIABLE): >60 ML/MIN/1.73 M^2
FERRITIN SERPL-MCNC: 210 NG/ML (ref 20–300)
GLUCOSE SERPL-MCNC: 96 MG/DL (ref 70–110)
HCT VFR BLD AUTO: 34.4 % (ref 37–48.5)
HDLC SERPL-MCNC: 37 MG/DL (ref 40–75)
HDLC SERPL: 37.4 % (ref 20–50)
HGB BLD-MCNC: 11.5 G/DL (ref 12–16)
IMM GRANULOCYTES # BLD AUTO: 0.02 K/UL (ref 0–0.04)
IMM GRANULOCYTES NFR BLD AUTO: 0.3 % (ref 0–0.5)
IRON SERPL-MCNC: 77 UG/DL (ref 30–160)
LDLC SERPL CALC-MCNC: 48.4 MG/DL (ref 63–159)
LYMPHOCYTES # BLD AUTO: 1.2 K/UL (ref 1–4.8)
LYMPHOCYTES NFR BLD: 18.7 % (ref 18–48)
MCH RBC QN AUTO: 29.3 PG (ref 27–31)
MCHC RBC AUTO-ENTMCNC: 33.4 G/DL (ref 32–36)
MCV RBC AUTO: 88 FL (ref 82–98)
MONOCYTES # BLD AUTO: 0.6 K/UL (ref 0.3–1)
MONOCYTES NFR BLD: 9.1 % (ref 4–15)
NEUTROPHILS # BLD AUTO: 4.4 K/UL (ref 1.8–7.7)
NEUTROPHILS NFR BLD: 68.6 % (ref 38–73)
NONHDLC SERPL-MCNC: 62 MG/DL
NRBC BLD-RTO: 0 /100 WBC
PLATELET # BLD AUTO: 233 K/UL (ref 150–450)
PMV BLD AUTO: 11.2 FL (ref 9.2–12.9)
POTASSIUM SERPL-SCNC: 4.4 MMOL/L (ref 3.5–5.1)
PROT SERPL-MCNC: 6.4 G/DL (ref 6–8.4)
RBC # BLD AUTO: 3.93 M/UL (ref 4–5.4)
SATURATED IRON: 24 % (ref 20–50)
SODIUM SERPL-SCNC: 141 MMOL/L (ref 136–145)
TOTAL IRON BINDING CAPACITY: 323 UG/DL (ref 250–450)
TRANSFERRIN SERPL-MCNC: 218 MG/DL (ref 200–375)
TRIGL SERPL-MCNC: 68 MG/DL (ref 30–150)
WBC # BLD AUTO: 6.37 K/UL (ref 3.9–12.7)

## 2024-06-07 PROCEDURE — 80053 COMPREHEN METABOLIC PANEL: CPT | Performed by: INTERNAL MEDICINE

## 2024-06-07 PROCEDURE — 83540 ASSAY OF IRON: CPT | Performed by: INTERNAL MEDICINE

## 2024-06-07 PROCEDURE — 80061 LIPID PANEL: CPT | Performed by: INTERNAL MEDICINE

## 2024-06-07 PROCEDURE — 85025 COMPLETE CBC W/AUTO DIFF WBC: CPT | Performed by: INTERNAL MEDICINE

## 2024-06-07 PROCEDURE — 82728 ASSAY OF FERRITIN: CPT | Performed by: INTERNAL MEDICINE

## 2024-06-07 PROCEDURE — 82306 VITAMIN D 25 HYDROXY: CPT | Performed by: INTERNAL MEDICINE

## 2024-06-10 LAB — 25(OH)D3+25(OH)D2 SERPL-MCNC: 30 NG/ML (ref 30–96)

## 2024-12-05 ENCOUNTER — HOSPITAL ENCOUNTER (OUTPATIENT)
Dept: RADIOLOGY | Facility: HOSPITAL | Age: 79
Discharge: HOME | End: 2024-12-05
Attending: INTERNAL MEDICINE
Payer: COMMERCIAL

## 2024-12-05 DIAGNOSIS — J20.9 BRONCHITIS WITH BRONCHOSPASM: ICD-10-CM

## 2024-12-05 PROCEDURE — 71046 X-RAY EXAM CHEST 2 VIEWS: CPT | Mod: TC,FY,PO

## 2024-12-05 PROCEDURE — 71046 X-RAY EXAM CHEST 2 VIEWS: CPT | Mod: 26,,, | Performed by: RADIOLOGY

## 2025-02-10 ENCOUNTER — LAB VISIT (OUTPATIENT)
Dept: LAB | Facility: HOSPITAL | Age: 80
End: 2025-02-10
Attending: INTERNAL MEDICINE
Payer: OTHER GOVERNMENT

## 2025-02-10 DIAGNOSIS — I25.9 CHRONIC ISCHEMIC HEART DISEASE, UNSPECIFIED: ICD-10-CM

## 2025-02-10 DIAGNOSIS — I25.9 CHRONIC ISCHEMIC HEART DISEASE, UNSPECIFIED: Primary | ICD-10-CM

## 2025-02-10 LAB
ALBUMIN SERPL BCP-MCNC: 3.8 G/DL (ref 3.5–5.2)
ALP SERPL-CCNC: 78 U/L (ref 40–150)
ALT SERPL W/O P-5'-P-CCNC: 10 U/L (ref 10–44)
ANION GAP SERPL CALC-SCNC: 7 MMOL/L (ref 8–16)
AST SERPL-CCNC: 16 U/L (ref 10–40)
BILIRUB SERPL-MCNC: 0.4 MG/DL (ref 0.1–1)
BNP SERPL-MCNC: 233 PG/ML (ref 0–99)
BUN SERPL-MCNC: 22 MG/DL (ref 8–23)
CALCIUM SERPL-MCNC: 9.6 MG/DL (ref 8.7–10.5)
CHLORIDE SERPL-SCNC: 107 MMOL/L (ref 95–110)
CO2 SERPL-SCNC: 27 MMOL/L (ref 23–29)
CREAT SERPL-MCNC: 0.8 MG/DL (ref 0.5–1.4)
EST. GFR  (NO RACE VARIABLE): >60 ML/MIN/1.73 M^2
GLUCOSE SERPL-MCNC: 91 MG/DL (ref 70–110)
POTASSIUM SERPL-SCNC: 4.9 MMOL/L (ref 3.5–5.1)
PROT SERPL-MCNC: 7.4 G/DL (ref 6–8.4)
SODIUM SERPL-SCNC: 141 MMOL/L (ref 136–145)

## 2025-02-10 PROCEDURE — 36415 COLL VENOUS BLD VENIPUNCTURE: CPT | Mod: PO | Performed by: INTERNAL MEDICINE

## 2025-02-10 PROCEDURE — 83880 ASSAY OF NATRIURETIC PEPTIDE: CPT | Performed by: INTERNAL MEDICINE

## 2025-02-10 PROCEDURE — 80053 COMPREHEN METABOLIC PANEL: CPT | Performed by: INTERNAL MEDICINE

## 2025-03-06 ENCOUNTER — HOSPITAL ENCOUNTER (OUTPATIENT)
Dept: RADIOLOGY | Facility: HOSPITAL | Age: 80
Discharge: HOME | End: 2025-03-06
Attending: INTERNAL MEDICINE
Payer: MEDICARE

## 2025-03-06 DIAGNOSIS — Z12.31 ENCOUNTER FOR SCREENING MAMMOGRAM FOR BREAST CANCER: ICD-10-CM

## 2025-03-06 PROCEDURE — 77063 BREAST TOMOSYNTHESIS BI: CPT | Mod: 26,,, | Performed by: RADIOLOGY

## 2025-03-06 PROCEDURE — 77067 SCR MAMMO BI INCL CAD: CPT | Mod: TC,PO

## 2025-03-06 PROCEDURE — 77067 SCR MAMMO BI INCL CAD: CPT | Mod: 26,,, | Performed by: RADIOLOGY

## 2025-07-16 ENCOUNTER — LAB VISIT (OUTPATIENT)
Dept: LAB | Facility: HOSPITAL | Age: 80
End: 2025-07-16
Attending: INTERNAL MEDICINE
Payer: MEDICARE

## 2025-07-16 DIAGNOSIS — R42 DIZZINESS AND GIDDINESS: ICD-10-CM

## 2025-07-16 DIAGNOSIS — E78.00 PURE HYPERCHOLESTEROLEMIA: ICD-10-CM

## 2025-07-16 DIAGNOSIS — D64.9 ANEMIA, UNSPECIFIED TYPE: ICD-10-CM

## 2025-07-16 DIAGNOSIS — I10 ESSENTIAL HYPERTENSION, MALIGNANT: ICD-10-CM

## 2025-07-16 DIAGNOSIS — Z79.899 ENCOUNTER FOR LONG-TERM (CURRENT) USE OF HIGH-RISK MEDICATION: ICD-10-CM

## 2025-07-16 DIAGNOSIS — I25.89 CORONARY ARTERITIS: ICD-10-CM

## 2025-07-16 DIAGNOSIS — E66.09 EXOGENOUS OBESITY: ICD-10-CM

## 2025-07-16 DIAGNOSIS — E78.2 MIXED HYPERLIPIDEMIA: ICD-10-CM

## 2025-07-16 LAB
ABSOLUTE EOSINOPHIL (OHS): 0.23 K/UL
ABSOLUTE MONOCYTE (OHS): 0.6 K/UL (ref 0.3–1)
ABSOLUTE NEUTROPHIL COUNT (OHS): 4.84 K/UL (ref 1.8–7.7)
ALBUMIN SERPL BCP-MCNC: 3.7 G/DL (ref 3.5–5.2)
ALP SERPL-CCNC: 83 UNIT/L (ref 40–150)
ALT SERPL W/O P-5'-P-CCNC: 11 UNIT/L (ref 10–44)
ANION GAP (OHS): 11 MMOL/L (ref 8–16)
AST SERPL-CCNC: 15 UNIT/L (ref 11–45)
BASOPHILS # BLD AUTO: 0.06 K/UL
BASOPHILS NFR BLD AUTO: 0.9 %
BILIRUB SERPL-MCNC: 0.7 MG/DL (ref 0.1–1)
BUN SERPL-MCNC: 20 MG/DL (ref 8–23)
CALCIUM SERPL-MCNC: 9.3 MG/DL (ref 8.7–10.5)
CHLORIDE SERPL-SCNC: 107 MMOL/L (ref 95–110)
CHOLEST SERPL-MCNC: 192 MG/DL (ref 120–199)
CHOLEST/HDLC SERPL: 4.2 {RATIO} (ref 2–5)
CO2 SERPL-SCNC: 25 MMOL/L (ref 23–29)
CREAT SERPL-MCNC: 0.7 MG/DL (ref 0.5–1.4)
EAG (OHS): 114 MG/DL (ref 68–131)
ERYTHROCYTE [DISTWIDTH] IN BLOOD BY AUTOMATED COUNT: 13.9 % (ref 11.5–14.5)
FERRITIN SERPL-MCNC: 219 NG/ML (ref 20–300)
GFR SERPLBLD CREATININE-BSD FMLA CKD-EPI: >60 ML/MIN/1.73/M2
GLUCOSE SERPL-MCNC: 97 MG/DL (ref 70–110)
HBA1C MFR BLD: 5.6 % (ref 4–5.6)
HCT VFR BLD AUTO: 35.6 % (ref 37–48.5)
HDLC SERPL-MCNC: 46 MG/DL (ref 40–75)
HDLC SERPL: 24 % (ref 20–50)
HGB BLD-MCNC: 11.7 GM/DL (ref 12–16)
IMM GRANULOCYTES # BLD AUTO: 0.03 K/UL (ref 0–0.04)
IMM GRANULOCYTES NFR BLD AUTO: 0.4 % (ref 0–0.5)
IRON SATN MFR SERPL: 21 % (ref 20–50)
IRON SERPL-MCNC: 62 UG/DL (ref 30–160)
LDLC SERPL CALC-MCNC: 126.8 MG/DL (ref 63–159)
LYMPHOCYTES # BLD AUTO: 1.25 K/UL (ref 1–4.8)
MCH RBC QN AUTO: 28.9 PG (ref 27–31)
MCHC RBC AUTO-ENTMCNC: 32.9 G/DL (ref 32–36)
MCV RBC AUTO: 88 FL (ref 82–98)
NONHDLC SERPL-MCNC: 146 MG/DL
NUCLEATED RBC (/100WBC) (OHS): 0 /100 WBC
PLATELET # BLD AUTO: 239 K/UL (ref 150–450)
PMV BLD AUTO: 10.7 FL (ref 9.2–12.9)
POTASSIUM SERPL-SCNC: 4.1 MMOL/L (ref 3.5–5.1)
PROT SERPL-MCNC: 7.3 GM/DL (ref 6–8.4)
RBC # BLD AUTO: 4.05 M/UL (ref 4–5.4)
RELATIVE EOSINOPHIL (OHS): 3.3 %
RELATIVE LYMPHOCYTE (OHS): 17.8 % (ref 18–48)
RELATIVE MONOCYTE (OHS): 8.6 % (ref 4–15)
RELATIVE NEUTROPHIL (OHS): 69 % (ref 38–73)
SODIUM SERPL-SCNC: 143 MMOL/L (ref 136–145)
TIBC SERPL-MCNC: 292 UG/DL (ref 250–450)
TRANSFERRIN SERPL-MCNC: 197 MG/DL (ref 200–375)
TRIGL SERPL-MCNC: 96 MG/DL (ref 30–150)
TSH SERPL-ACNC: 1.26 UIU/ML (ref 0.4–4)
VIT B12 SERPL-MCNC: 521 PG/ML (ref 210–950)
WBC # BLD AUTO: 7.01 K/UL (ref 3.9–12.7)

## 2025-07-16 PROCEDURE — 36415 COLL VENOUS BLD VENIPUNCTURE: CPT | Mod: PO

## 2025-07-16 PROCEDURE — 82728 ASSAY OF FERRITIN: CPT

## 2025-07-16 PROCEDURE — 85025 COMPLETE CBC W/AUTO DIFF WBC: CPT

## 2025-07-16 PROCEDURE — 83036 HEMOGLOBIN GLYCOSYLATED A1C: CPT

## 2025-07-16 PROCEDURE — 83540 ASSAY OF IRON: CPT

## 2025-07-16 PROCEDURE — 84443 ASSAY THYROID STIM HORMONE: CPT

## 2025-07-16 PROCEDURE — 82607 VITAMIN B-12: CPT

## 2025-07-16 PROCEDURE — 80053 COMPREHEN METABOLIC PANEL: CPT

## 2025-07-16 PROCEDURE — 80061 LIPID PANEL: CPT
